# Patient Record
Sex: MALE | Race: BLACK OR AFRICAN AMERICAN | Employment: UNEMPLOYED | URBAN - METROPOLITAN AREA
[De-identification: names, ages, dates, MRNs, and addresses within clinical notes are randomized per-mention and may not be internally consistent; named-entity substitution may affect disease eponyms.]

---

## 2018-01-13 PROBLEM — R19.7 NAUSEA VOMITING AND DIARRHEA: Status: ACTIVE | Noted: 2018-01-13

## 2018-01-13 PROBLEM — R11.2 NAUSEA VOMITING AND DIARRHEA: Status: ACTIVE | Noted: 2018-01-13

## 2018-01-13 PROBLEM — R10.10 PAIN OF UPPER ABDOMEN: Status: ACTIVE | Noted: 2018-01-13

## 2018-03-14 ENCOUNTER — TELEPHONE (OUTPATIENT)
Dept: ADMINISTRATIVE | Age: 48
End: 2018-03-14

## 2018-04-17 ENCOUNTER — OFFICE VISIT (OUTPATIENT)
Dept: FAMILY MEDICINE CLINIC | Age: 48
End: 2018-04-17
Payer: COMMERCIAL

## 2018-04-17 VITALS
SYSTOLIC BLOOD PRESSURE: 130 MMHG | RESPIRATION RATE: 12 BRPM | TEMPERATURE: 98.4 F | WEIGHT: 170 LBS | BODY MASS INDEX: 24.05 KG/M2 | HEART RATE: 82 BPM | DIASTOLIC BLOOD PRESSURE: 88 MMHG | OXYGEN SATURATION: 98 %

## 2018-04-17 DIAGNOSIS — I10 HYPERTENSION, UNSPECIFIED TYPE: ICD-10-CM

## 2018-04-17 DIAGNOSIS — M67.432 GANGLION CYST OF WRIST, LEFT: Primary | ICD-10-CM

## 2018-04-17 PROCEDURE — G8420 CALC BMI NORM PARAMETERS: HCPCS | Performed by: FAMILY MEDICINE

## 2018-04-17 PROCEDURE — G8427 DOCREV CUR MEDS BY ELIG CLIN: HCPCS | Performed by: FAMILY MEDICINE

## 2018-04-17 PROCEDURE — 99212 OFFICE O/P EST SF 10 MIN: CPT | Performed by: STUDENT IN AN ORGANIZED HEALTH CARE EDUCATION/TRAINING PROGRAM

## 2018-04-17 PROCEDURE — 1036F TOBACCO NON-USER: CPT | Performed by: FAMILY MEDICINE

## 2018-04-17 PROCEDURE — 99213 OFFICE O/P EST LOW 20 MIN: CPT | Performed by: STUDENT IN AN ORGANIZED HEALTH CARE EDUCATION/TRAINING PROGRAM

## 2018-04-17 RX ORDER — NAPROXEN 500 MG/1
500 TABLET ORAL 2 TIMES DAILY WITH MEALS
Qty: 60 TABLET | Refills: 3 | Status: SHIPPED | OUTPATIENT
Start: 2018-04-17 | End: 2018-10-28 | Stop reason: SINTOL

## 2018-04-17 ASSESSMENT — PATIENT HEALTH QUESTIONNAIRE - PHQ9
1. LITTLE INTEREST OR PLEASURE IN DOING THINGS: 0
SUM OF ALL RESPONSES TO PHQ QUESTIONS 1-9: 0
SUM OF ALL RESPONSES TO PHQ9 QUESTIONS 1 & 2: 0
2. FEELING DOWN, DEPRESSED OR HOPELESS: 0

## 2018-04-23 ASSESSMENT — ENCOUNTER SYMPTOMS
HEARTBURN: 0
SHORTNESS OF BREATH: 0
ABDOMINAL PAIN: 0
WHEEZING: 0

## 2018-05-07 ENCOUNTER — TELEPHONE (OUTPATIENT)
Dept: ADMINISTRATIVE | Age: 48
End: 2018-05-07

## 2018-05-17 ENCOUNTER — PROCEDURE VISIT (OUTPATIENT)
Dept: FAMILY MEDICINE CLINIC | Age: 48
End: 2018-05-17
Payer: COMMERCIAL

## 2018-05-17 ENCOUNTER — HOSPITAL ENCOUNTER (OUTPATIENT)
Age: 48
Discharge: HOME | End: 2018-05-19
Payer: COMMERCIAL

## 2018-05-17 VITALS
WEIGHT: 162 LBS | TEMPERATURE: 98.6 F | SYSTOLIC BLOOD PRESSURE: 154 MMHG | RESPIRATION RATE: 16 BRPM | OXYGEN SATURATION: 99 % | DIASTOLIC BLOOD PRESSURE: 98 MMHG | BODY MASS INDEX: 23.99 KG/M2 | HEIGHT: 69 IN | HEART RATE: 82 BPM

## 2018-05-17 DIAGNOSIS — I10 ESSENTIAL HYPERTENSION: ICD-10-CM

## 2018-05-17 DIAGNOSIS — Z20.2 POTENTIAL EXPOSURE TO STD: ICD-10-CM

## 2018-05-17 DIAGNOSIS — A64 STD (MALE): ICD-10-CM

## 2018-05-17 DIAGNOSIS — M67.432 GANGLION CYST OF WRIST, LEFT: Primary | ICD-10-CM

## 2018-05-17 PROCEDURE — G8420 CALC BMI NORM PARAMETERS: HCPCS | Performed by: STUDENT IN AN ORGANIZED HEALTH CARE EDUCATION/TRAINING PROGRAM

## 2018-05-17 PROCEDURE — 2500000003 HC RX 250 WO HCPCS

## 2018-05-17 PROCEDURE — 99213 OFFICE O/P EST LOW 20 MIN: CPT | Performed by: STUDENT IN AN ORGANIZED HEALTH CARE EDUCATION/TRAINING PROGRAM

## 2018-05-17 PROCEDURE — 87591 N.GONORRHOEAE DNA AMP PROB: CPT

## 2018-05-17 PROCEDURE — G8427 DOCREV CUR MEDS BY ELIG CLIN: HCPCS | Performed by: STUDENT IN AN ORGANIZED HEALTH CARE EDUCATION/TRAINING PROGRAM

## 2018-05-17 PROCEDURE — 87491 CHLMYD TRACH DNA AMP PROBE: CPT

## 2018-05-17 PROCEDURE — 99212 OFFICE O/P EST SF 10 MIN: CPT | Performed by: STUDENT IN AN ORGANIZED HEALTH CARE EDUCATION/TRAINING PROGRAM

## 2018-05-17 PROCEDURE — 20612 ASPIRATE/INJ GANGLION CYST: CPT | Performed by: STUDENT IN AN ORGANIZED HEALTH CARE EDUCATION/TRAINING PROGRAM

## 2018-05-17 PROCEDURE — 1036F TOBACCO NON-USER: CPT | Performed by: STUDENT IN AN ORGANIZED HEALTH CARE EDUCATION/TRAINING PROGRAM

## 2018-05-17 RX ORDER — LIDOCAINE HYDROCHLORIDE 10 MG/ML
1.5 INJECTION, SOLUTION INFILTRATION; PERINEURAL ONCE
Status: COMPLETED | OUTPATIENT
Start: 2018-05-17 | End: 2018-05-17

## 2018-05-17 RX ORDER — LISINOPRIL 5 MG/1
5 TABLET ORAL DAILY
Qty: 30 TABLET | Refills: 3 | Status: SHIPPED | OUTPATIENT
Start: 2018-05-17 | End: 2018-07-19 | Stop reason: SDUPTHER

## 2018-05-17 RX ADMIN — LIDOCAINE HYDROCHLORIDE 1.5 ML: 10 INJECTION, SOLUTION INFILTRATION; PERINEURAL at 14:30

## 2018-05-23 LAB
CHLAMYDIA TRACHOMATIS AMPLIFIED DET: NORMAL
N GONORRHOEAE AMPLIFIED DET: NORMAL

## 2018-05-24 ENCOUNTER — TELEPHONE (OUTPATIENT)
Dept: FAMILY MEDICINE CLINIC | Age: 48
End: 2018-05-24

## 2018-07-19 DIAGNOSIS — I10 ESSENTIAL HYPERTENSION: ICD-10-CM

## 2018-07-19 RX ORDER — LISINOPRIL 5 MG/1
5 TABLET ORAL DAILY
Qty: 30 TABLET | Refills: 3 | Status: SHIPPED | OUTPATIENT
Start: 2018-07-19 | End: 2018-10-11 | Stop reason: SDUPTHER

## 2018-08-08 ENCOUNTER — TELEPHONE (OUTPATIENT)
Dept: ADMINISTRATIVE | Age: 48
End: 2018-08-08

## 2018-10-11 DIAGNOSIS — I10 ESSENTIAL HYPERTENSION: ICD-10-CM

## 2018-10-11 RX ORDER — LISINOPRIL 5 MG/1
5 TABLET ORAL DAILY
Qty: 30 TABLET | Refills: 3 | Status: SHIPPED | OUTPATIENT
Start: 2018-10-11 | End: 2019-07-24 | Stop reason: SDUPTHER

## 2018-10-25 ENCOUNTER — OFFICE VISIT (OUTPATIENT)
Dept: FAMILY MEDICINE CLINIC | Age: 48
End: 2018-10-25
Payer: COMMERCIAL

## 2018-10-25 VITALS
TEMPERATURE: 99.1 F | HEART RATE: 70 BPM | OXYGEN SATURATION: 98 % | SYSTOLIC BLOOD PRESSURE: 130 MMHG | BODY MASS INDEX: 26.05 KG/M2 | RESPIRATION RATE: 16 BRPM | DIASTOLIC BLOOD PRESSURE: 80 MMHG | WEIGHT: 171.9 LBS | HEIGHT: 68 IN

## 2018-10-25 DIAGNOSIS — R12 HEARTBURN: ICD-10-CM

## 2018-10-25 DIAGNOSIS — Z71.1 CONCERN ABOUT SEXUALLY TRANSMITTED DISEASE IN MALE WITHOUT DIAGNOSIS: ICD-10-CM

## 2018-10-25 DIAGNOSIS — M67.432 GANGLION CYST OF DORSUM OF LEFT WRIST: ICD-10-CM

## 2018-10-25 DIAGNOSIS — I10 ESSENTIAL HYPERTENSION: ICD-10-CM

## 2018-10-25 DIAGNOSIS — K92.1 BLOOD IN STOOL: Primary | ICD-10-CM

## 2018-10-25 PROCEDURE — G8419 CALC BMI OUT NRM PARAM NOF/U: HCPCS | Performed by: STUDENT IN AN ORGANIZED HEALTH CARE EDUCATION/TRAINING PROGRAM

## 2018-10-25 PROCEDURE — 1036F TOBACCO NON-USER: CPT | Performed by: STUDENT IN AN ORGANIZED HEALTH CARE EDUCATION/TRAINING PROGRAM

## 2018-10-25 PROCEDURE — G8484 FLU IMMUNIZE NO ADMIN: HCPCS | Performed by: STUDENT IN AN ORGANIZED HEALTH CARE EDUCATION/TRAINING PROGRAM

## 2018-10-25 PROCEDURE — 99213 OFFICE O/P EST LOW 20 MIN: CPT | Performed by: STUDENT IN AN ORGANIZED HEALTH CARE EDUCATION/TRAINING PROGRAM

## 2018-10-25 PROCEDURE — 99212 OFFICE O/P EST SF 10 MIN: CPT | Performed by: STUDENT IN AN ORGANIZED HEALTH CARE EDUCATION/TRAINING PROGRAM

## 2018-10-25 PROCEDURE — G8427 DOCREV CUR MEDS BY ELIG CLIN: HCPCS | Performed by: STUDENT IN AN ORGANIZED HEALTH CARE EDUCATION/TRAINING PROGRAM

## 2018-10-26 ENCOUNTER — HOSPITAL ENCOUNTER (OUTPATIENT)
Age: 48
Discharge: HOME | End: 2018-10-28
Payer: COMMERCIAL

## 2018-10-26 DIAGNOSIS — Z71.1 CONCERN ABOUT SEXUALLY TRANSMITTED DISEASE IN MALE WITHOUT DIAGNOSIS: ICD-10-CM

## 2018-10-26 PROCEDURE — 87591 N.GONORRHOEAE DNA AMP PROB: CPT

## 2018-10-26 PROCEDURE — 87491 CHLMYD TRACH DNA AMP PROBE: CPT

## 2018-10-28 ASSESSMENT — ENCOUNTER SYMPTOMS
SHORTNESS OF BREATH: 0
ABDOMINAL PAIN: 0
WHEEZING: 0

## 2018-10-29 LAB
CHLAMYDIA TRACHOMATIS AMPLIFIED DET: NORMAL
N GONORRHOEAE AMPLIFIED DET: NORMAL

## 2018-10-29 RX ORDER — OMEPRAZOLE 40 MG/1
40 CAPSULE, DELAYED RELEASE ORAL
Qty: 90 CAPSULE | Refills: 0 | Status: SHIPPED | OUTPATIENT
Start: 2018-10-29 | End: 2019-09-20 | Stop reason: SDUPTHER

## 2018-11-05 ENCOUNTER — TELEPHONE (OUTPATIENT)
Dept: FAMILY MEDICINE CLINIC | Age: 48
End: 2018-11-05

## 2018-12-01 RX ORDER — NAPROXEN 500 MG/1
500 TABLET ORAL 2 TIMES DAILY WITH MEALS
Qty: 60 TABLET | Refills: 3 | Status: SHIPPED | OUTPATIENT
Start: 2018-12-01 | End: 2019-09-20 | Stop reason: SDUPTHER

## 2019-01-29 ENCOUNTER — TELEPHONE (OUTPATIENT)
Dept: SURGERY | Age: 49
End: 2019-01-29

## 2019-07-24 DIAGNOSIS — I10 ESSENTIAL HYPERTENSION: ICD-10-CM

## 2019-07-24 RX ORDER — LISINOPRIL 5 MG/1
5 TABLET ORAL DAILY
Qty: 30 TABLET | Refills: 3 | Status: SHIPPED | OUTPATIENT
Start: 2019-07-24 | End: 2019-09-20 | Stop reason: SDUPTHER

## 2019-08-22 ENCOUNTER — OFFICE VISIT (OUTPATIENT)
Dept: FAMILY MEDICINE CLINIC | Age: 49
End: 2019-08-22
Payer: COMMERCIAL

## 2019-08-22 ENCOUNTER — HOSPITAL ENCOUNTER (OUTPATIENT)
Age: 49
Discharge: HOME | End: 2019-08-24
Payer: COMMERCIAL

## 2019-08-22 VITALS
HEART RATE: 70 BPM | RESPIRATION RATE: 12 BRPM | DIASTOLIC BLOOD PRESSURE: 106 MMHG | OXYGEN SATURATION: 99 % | SYSTOLIC BLOOD PRESSURE: 152 MMHG | BODY MASS INDEX: 23.49 KG/M2 | HEIGHT: 68 IN | TEMPERATURE: 98.5 F | WEIGHT: 155 LBS

## 2019-08-22 DIAGNOSIS — M67.432 GANGLION CYST OF DORSUM OF LEFT WRIST: ICD-10-CM

## 2019-08-22 DIAGNOSIS — Z71.1 CONCERN ABOUT SEXUALLY TRANSMITTED DISEASE IN MALE WITHOUT DIAGNOSIS: Primary | ICD-10-CM

## 2019-08-22 DIAGNOSIS — I10 ESSENTIAL HYPERTENSION: ICD-10-CM

## 2019-08-22 DIAGNOSIS — M79.661 RIGHT CALF PAIN: ICD-10-CM

## 2019-08-22 DIAGNOSIS — Z00.00 HEALTHCARE MAINTENANCE: ICD-10-CM

## 2019-08-22 DIAGNOSIS — Z71.1 CONCERN ABOUT SEXUALLY TRANSMITTED DISEASE IN MALE WITHOUT DIAGNOSIS: ICD-10-CM

## 2019-08-22 LAB
ANION GAP SERPL CALCULATED.3IONS-SCNC: 13 MMOL/L (ref 7–16)
BUN BLDV-MCNC: 10 MG/DL (ref 6–20)
CALCIUM SERPL-MCNC: 9.4 MG/DL (ref 8.6–10.2)
CHLORIDE BLD-SCNC: 104 MMOL/L (ref 98–107)
CO2: 24 MMOL/L (ref 22–29)
CREAT SERPL-MCNC: 1.2 MG/DL (ref 0.7–1.2)
GFR AFRICAN AMERICAN: >60
GFR NON-AFRICAN AMERICAN: >60 ML/MIN/1.73
GLUCOSE BLD-MCNC: 92 MG/DL (ref 74–99)
POTASSIUM SERPL-SCNC: 4.1 MMOL/L (ref 3.5–5)
SODIUM BLD-SCNC: 141 MMOL/L (ref 132–146)

## 2019-08-22 PROCEDURE — 1036F TOBACCO NON-USER: CPT | Performed by: STUDENT IN AN ORGANIZED HEALTH CARE EDUCATION/TRAINING PROGRAM

## 2019-08-22 PROCEDURE — G8428 CUR MEDS NOT DOCUMENT: HCPCS | Performed by: STUDENT IN AN ORGANIZED HEALTH CARE EDUCATION/TRAINING PROGRAM

## 2019-08-22 PROCEDURE — 99213 OFFICE O/P EST LOW 20 MIN: CPT | Performed by: STUDENT IN AN ORGANIZED HEALTH CARE EDUCATION/TRAINING PROGRAM

## 2019-08-22 PROCEDURE — G8420 CALC BMI NORM PARAMETERS: HCPCS | Performed by: STUDENT IN AN ORGANIZED HEALTH CARE EDUCATION/TRAINING PROGRAM

## 2019-08-22 PROCEDURE — 36415 COLL VENOUS BLD VENIPUNCTURE: CPT | Performed by: FAMILY MEDICINE

## 2019-08-22 PROCEDURE — 87491 CHLMYD TRACH DNA AMP PROBE: CPT

## 2019-08-22 PROCEDURE — 99212 OFFICE O/P EST SF 10 MIN: CPT | Performed by: STUDENT IN AN ORGANIZED HEALTH CARE EDUCATION/TRAINING PROGRAM

## 2019-08-22 PROCEDURE — 87591 N.GONORRHOEAE DNA AMP PROB: CPT

## 2019-08-22 PROCEDURE — 80048 BASIC METABOLIC PNL TOTAL CA: CPT

## 2019-08-22 PROCEDURE — 36415 COLL VENOUS BLD VENIPUNCTURE: CPT

## 2019-08-22 NOTE — PROGRESS NOTES
[address]  Department of Family Medicine  Family Medicine Residency Program      Patient:  Riaz Bach 50 y.o. male     Date of Service: 8/22/19      Chief complaint:   Chief Complaint   Patient presents with    Cyst     left wrist    Other     urine tingling         History of Present Illness   The patient is a 50 y.o. male who presented to the clinic for check up. Pain in the right calf: Has been hurting for 2 weeks. It is intermittent, no particular alleviation or aggravating factors. No history of immobility, no history of clotting disease, no recent travels. High risk sexual behavior: Patient reports to be still sexually active with 2 women. He is not consistent with using protection. She wants to get a GC/chlamydia checked for to be on the safe side. He denies any penile discharge, any rash. He reports occasional scrotal tingling. He denies dysuria or hematuria. Patient had reported this before, however it is much less frequent now. Ganglion cyst on left wrist.  He has had this before, which was aspirated. Patient was warned that there was a high likelihood that he may come back. He came back, however not as big as before. Patient denies any pain, currently not bothering him. HTN: BP elevated today. Patient is on lisinopril 5 mg. Patient does not want to take more medication or increase the dose of the lisinopril. He will come back in 1 week for BP check. Patient denies any chest pain, dizziness, headaches. Past Medical History:      Diagnosis Date    Hypertension        Past Surgical History:    No past surgical history on file. Allergies:    Patient has no known allergies.     Social History:   Social History     Socioeconomic History    Marital status: Single     Spouse name: Not on file    Number of children: Not on file    Years of education: Not on file    Highest education level: Not on file   Occupational History    Not on file Social Needs    Financial resource strain: Not on file    Food insecurity:     Worry: Not on file     Inability: Not on file    Transportation needs:     Medical: Not on file     Non-medical: Not on file   Tobacco Use    Smoking status: Never Smoker    Smokeless tobacco: Never Used   Substance and Sexual Activity    Alcohol use: Yes    Drug use: No    Sexual activity: Not on file   Lifestyle    Physical activity:     Days per week: Not on file     Minutes per session: Not on file    Stress: Not on file   Relationships    Social connections:     Talks on phone: Not on file     Gets together: Not on file     Attends Nondenominational service: Not on file     Active member of club or organization: Not on file     Attends meetings of clubs or organizations: Not on file     Relationship status: Not on file    Intimate partner violence:     Fear of current or ex partner: Not on file     Emotionally abused: Not on file     Physically abused: Not on file     Forced sexual activity: Not on file   Other Topics Concern    Not on file   Social History Narrative    Not on file       Family History:   No family history on file. [employer] Systems:   Review of Systems   Constitutional: Negative for fever. Respiratory: Negative for shortness of breath and wheezing. Cardiovascular: Negative for chest pain and palpitations. Gastrointestinal: Negative for abdominal pain. Genitourinary: Negative for discharge, dysuria, frequency, genital sores, hematuria, scrotal swelling and testicular pain.    Musculoskeletal:        Ganglion cyst on left wrist         Physical Exam   Vitals: BP (!) 152/106 (Site: Left Upper Arm, Position: Sitting, Cuff Size: Medium Adult)   Pulse 70   Temp 98.5 °F (36.9 °C) (Oral)   Resp 12   Ht 5' 8\" (1.727 m)   Wt 155 lb (70.3 kg)   SpO2 99%   BMI 23.57 kg/m²   General Appearance: alert, oriented, no acute distress  Chest wall/Lung: Clear to auscultation bilaterally,  respirations

## 2019-08-22 NOTE — PATIENT INSTRUCTIONS
Patient Education        Calf Strain: Rehab Exercises  Introduction  Here are some examples of exercises for you to try. The exercises may be suggested for a condition or for rehabilitation. Start each exercise slowly. Ease off the exercises if you start to have pain. You will be told when to start these exercises and which ones will work best for you. How to do the exercises  Calf wall stretch (back knee straight)    1. Stand facing a wall with your hands on the wall at about eye level. Put your affected leg about a step behind your other leg. 2. Keeping your back leg straight and your back heel on the floor, bend your front knee and gently bring your hip and chest toward the wall until you feel a stretch in the calf of your back leg. 3. Hold the stretch for at least 15 to 30 seconds. 4. Repeat 2 to 4 times. Calf wall stretch (knees bent)    1. Stand facing a wall with your hands on the wall at about eye level. Put your affected leg about a step behind your other leg. 2. Keeping both heels on the floor, bend both knees. Then gently bring your hip and chest toward the wall until you feel a stretch in the calf of your back leg. 3. Hold the stretch for at least 15 to 30 seconds. 4. Repeat 2 to 4 times. Bilateral calf stretch (knees straight)    1. Place a book on the floor a few inches from a wall or countertop, and put the balls of your feet on it. Your heels should be on the floor. The book needs to be thick enough so that you can feel a gentle stretch in each calf. If you are not steady on your feet, hold on to a chair, counter, or wall while you do this stretch. 2. Keep your knees straight, and lean forward until you feel a stretch in each calf. 3. To get more stretch, add another book or use a thicker book, such as a phone book, a dictionary, or an encyclopedia. 4. Hold the stretch for at least 15 to 30 seconds. 5. Repeat 2 to 4 times. Bilateral calf stretch (knees bent)    1.  Place a book on the floor a few inches from a wall or countertop, and put the balls of your feet on it. Your heels should be on the floor. The book needs to be thick enough so that you can feel a gentle stretch in each calf. If you are not steady on your feet, hold on to a chair, counter, or wall while you do this stretch. 2. Bend your knees, and lean forward until you feel a stretch in each calf. 3. To get more stretch, add another book or use a thicker book, such as a phone book, a dictionary, or an encyclopedia. 4. Hold the stretch for at least 15 to 30 seconds. 5. Repeat 2 to 4 times. Ankle plantarflexion    1. Sit with your affected leg straight and supported on the floor. Your other leg should be bent, with that foot flat on the floor. 2. Keeping your affected leg straight, gently flex your foot downward so your toes are pointed away from your body. Then slowly relax your foot to the starting position. 3. Repeat 8 to 12 times. Ankle dorsiflexion    1. Sit with your affected leg straight and supported on the floor. Your other leg should be bent, with that foot flat on the floor. 2. Keeping your leg straight, gently flex your foot back so your toes point upward. Then slowly relax your foot to the starting position. 3. Repeat 8 to 12 times. Bilateral heel raises on step    1. Stand on the bottom step of a staircase, facing up toward the stairs. Put the balls of your feet on the step. If you are not steady on your feet, hold on to the banister or wall. 2. Keeping both knees straight, slowly lift your heels above the step so that you are standing on your toes. Then slowly lower your heels below the step and toward the floor. 3. Return to the starting position, with your feet even with the step. 4. Repeat 8 to 12 times. Follow-up care is a key part of your treatment and safety. Be sure to make and go to all appointments, and call your doctor if you are having problems.  It's also a good idea to know your test

## 2019-08-23 ASSESSMENT — ENCOUNTER SYMPTOMS
WHEEZING: 0
SHORTNESS OF BREATH: 0
ABDOMINAL PAIN: 0

## 2019-08-27 LAB
C. TRACHOMATIS DNA ,URINE: NEGATIVE
N. GONORRHOEAE DNA, URINE: NEGATIVE
SOURCE: NORMAL

## 2019-09-17 ENCOUNTER — TELEPHONE (OUTPATIENT)
Dept: FAMILY MEDICINE CLINIC | Age: 49
End: 2019-09-17

## 2019-09-17 NOTE — TELEPHONE ENCOUNTER
I called the patient back and discussed about results. He endorses understanding. He will call to make an appointment after he gets back home from out of town sometime next week.      Harriet Roque MD

## 2019-09-20 DIAGNOSIS — I10 ESSENTIAL HYPERTENSION: ICD-10-CM

## 2019-09-20 DIAGNOSIS — R12 HEARTBURN: ICD-10-CM

## 2019-09-20 RX ORDER — OMEPRAZOLE 40 MG/1
40 CAPSULE, DELAYED RELEASE ORAL
Qty: 90 CAPSULE | Refills: 0 | Status: SHIPPED | OUTPATIENT
Start: 2019-09-20 | End: 2019-10-17 | Stop reason: SDUPTHER

## 2019-09-20 RX ORDER — NAPROXEN 500 MG/1
500 TABLET ORAL 2 TIMES DAILY WITH MEALS
Qty: 60 TABLET | Refills: 2 | Status: SHIPPED
Start: 2019-09-20 | End: 2020-02-24 | Stop reason: SDUPTHER

## 2019-09-20 RX ORDER — LISINOPRIL 5 MG/1
5 TABLET ORAL DAILY
Qty: 90 TABLET | Refills: 1 | Status: SHIPPED | OUTPATIENT
Start: 2019-09-20 | End: 2019-10-17 | Stop reason: SDUPTHER

## 2019-10-17 ENCOUNTER — OFFICE VISIT (OUTPATIENT)
Dept: FAMILY MEDICINE CLINIC | Age: 49
End: 2019-10-17
Payer: COMMERCIAL

## 2019-10-17 ENCOUNTER — HOSPITAL ENCOUNTER (OUTPATIENT)
Age: 49
Discharge: HOME | End: 2019-10-19
Payer: COMMERCIAL

## 2019-10-17 VITALS
HEART RATE: 79 BPM | TEMPERATURE: 98.4 F | BODY MASS INDEX: 23.95 KG/M2 | DIASTOLIC BLOOD PRESSURE: 95 MMHG | OXYGEN SATURATION: 97 % | HEIGHT: 68 IN | WEIGHT: 158 LBS | SYSTOLIC BLOOD PRESSURE: 136 MMHG

## 2019-10-17 DIAGNOSIS — Z23 NEED FOR VACCINATION FOR H FLU TYPE B: Primary | ICD-10-CM

## 2019-10-17 DIAGNOSIS — M54.41 ACUTE RIGHT-SIDED LOW BACK PAIN WITH RIGHT-SIDED SCIATICA: ICD-10-CM

## 2019-10-17 DIAGNOSIS — I10 ESSENTIAL HYPERTENSION: ICD-10-CM

## 2019-10-17 DIAGNOSIS — Z92.89: ICD-10-CM

## 2019-10-17 DIAGNOSIS — J06.9 VIRAL URI: ICD-10-CM

## 2019-10-17 DIAGNOSIS — M25.531 WRIST PAIN, ACUTE, RIGHT: ICD-10-CM

## 2019-10-17 DIAGNOSIS — M79.661 RIGHT CALF PAIN: ICD-10-CM

## 2019-10-17 DIAGNOSIS — R12 HEARTBURN: ICD-10-CM

## 2019-10-17 LAB
ANION GAP SERPL CALCULATED.3IONS-SCNC: 10 MMOL/L (ref 7–16)
BUN BLDV-MCNC: 15 MG/DL (ref 6–20)
CALCIUM SERPL-MCNC: 9.9 MG/DL (ref 8.6–10.2)
CHLORIDE BLD-SCNC: 96 MMOL/L (ref 98–107)
CO2: 28 MMOL/L (ref 22–29)
CREAT SERPL-MCNC: 1.2 MG/DL (ref 0.7–1.2)
GFR AFRICAN AMERICAN: >60
GFR NON-AFRICAN AMERICAN: >60 ML/MIN/1.73
GLUCOSE BLD-MCNC: 118 MG/DL (ref 74–99)
POTASSIUM SERPL-SCNC: 3.9 MMOL/L (ref 3.5–5)
RHEUMATOID FACTOR: 13 IU/ML (ref 0–13)
SODIUM BLD-SCNC: 134 MMOL/L (ref 132–146)

## 2019-10-17 PROCEDURE — 6360000002 HC RX W HCPCS

## 2019-10-17 PROCEDURE — G0008 ADMIN INFLUENZA VIRUS VAC: HCPCS

## 2019-10-17 PROCEDURE — 80048 BASIC METABOLIC PNL TOTAL CA: CPT

## 2019-10-17 PROCEDURE — 86431 RHEUMATOID FACTOR QUANT: CPT

## 2019-10-17 PROCEDURE — 36415 COLL VENOUS BLD VENIPUNCTURE: CPT

## 2019-10-17 PROCEDURE — 36415 COLL VENOUS BLD VENIPUNCTURE: CPT | Performed by: FAMILY MEDICINE

## 2019-10-17 PROCEDURE — 90686 IIV4 VACC NO PRSV 0.5 ML IM: CPT

## 2019-10-17 PROCEDURE — 99212 OFFICE O/P EST SF 10 MIN: CPT | Performed by: STUDENT IN AN ORGANIZED HEALTH CARE EDUCATION/TRAINING PROGRAM

## 2019-10-17 PROCEDURE — 99213 OFFICE O/P EST LOW 20 MIN: CPT | Performed by: STUDENT IN AN ORGANIZED HEALTH CARE EDUCATION/TRAINING PROGRAM

## 2019-10-17 RX ORDER — OMEPRAZOLE 40 MG/1
40 CAPSULE, DELAYED RELEASE ORAL
Qty: 90 CAPSULE | Refills: 0 | Status: SHIPPED
Start: 2019-10-17 | End: 2020-02-24 | Stop reason: SDUPTHER

## 2019-10-17 RX ORDER — LISINOPRIL 10 MG/1
10 TABLET ORAL DAILY
Qty: 90 TABLET | Refills: 1 | Status: SHIPPED
Start: 2019-10-17 | End: 2020-02-24 | Stop reason: SDUPTHER

## 2019-10-17 RX ORDER — LISINOPRIL 5 MG/1
5 TABLET ORAL DAILY
Qty: 90 TABLET | Refills: 1 | Status: SHIPPED | OUTPATIENT
Start: 2019-10-17 | End: 2019-10-17 | Stop reason: ALTCHOICE

## 2019-10-17 ASSESSMENT — PATIENT HEALTH QUESTIONNAIRE - PHQ9
SUM OF ALL RESPONSES TO PHQ9 QUESTIONS 1 & 2: 2
1. LITTLE INTEREST OR PLEASURE IN DOING THINGS: 1
SUM OF ALL RESPONSES TO PHQ QUESTIONS 1-9: 2
2. FEELING DOWN, DEPRESSED OR HOPELESS: 1
SUM OF ALL RESPONSES TO PHQ QUESTIONS 1-9: 2

## 2019-10-18 ENCOUNTER — TELEPHONE (OUTPATIENT)
Dept: FAMILY MEDICINE CLINIC | Age: 49
End: 2019-10-18

## 2019-10-19 RX ORDER — BENZONATATE 100 MG/1
100-200 CAPSULE ORAL 3 TIMES DAILY PRN
Qty: 42 CAPSULE | Refills: 0 | Status: SHIPPED | OUTPATIENT
Start: 2019-10-19 | End: 2019-10-26

## 2019-11-07 ENCOUNTER — HOSPITAL ENCOUNTER (OUTPATIENT)
Dept: GENERAL RADIOLOGY | Age: 49
Discharge: HOME | End: 2019-11-09
Payer: COMMERCIAL

## 2019-11-07 ENCOUNTER — TELEPHONE (OUTPATIENT)
Dept: FAMILY MEDICINE CLINIC | Age: 49
End: 2019-11-07

## 2019-11-07 ENCOUNTER — HOSPITAL ENCOUNTER (OUTPATIENT)
Age: 49
Discharge: HOME | End: 2019-11-09
Payer: COMMERCIAL

## 2019-11-07 ENCOUNTER — HOSPITAL ENCOUNTER (OUTPATIENT)
Dept: ULTRASOUND IMAGING | Age: 49
Discharge: HOME | End: 2019-11-09
Payer: COMMERCIAL

## 2019-11-07 DIAGNOSIS — M54.41 ACUTE RIGHT-SIDED LOW BACK PAIN WITH RIGHT-SIDED SCIATICA: ICD-10-CM

## 2019-11-07 DIAGNOSIS — M79.661 RIGHT CALF PAIN: Primary | ICD-10-CM

## 2019-11-07 DIAGNOSIS — M79.661 RIGHT CALF PAIN: ICD-10-CM

## 2019-11-07 PROCEDURE — 93971 EXTREMITY STUDY: CPT

## 2019-11-07 PROCEDURE — 72100 X-RAY EXAM L-S SPINE 2/3 VWS: CPT

## 2019-11-07 PROCEDURE — 73521 X-RAY EXAM HIPS BI 2 VIEWS: CPT

## 2019-11-20 ENCOUNTER — TELEPHONE (OUTPATIENT)
Dept: FAMILY MEDICINE CLINIC | Age: 49
End: 2019-11-20

## 2019-11-21 ENCOUNTER — TELEPHONE (OUTPATIENT)
Dept: FAMILY MEDICINE CLINIC | Age: 49
End: 2019-11-21

## 2020-02-24 ENCOUNTER — HOSPITAL ENCOUNTER (OUTPATIENT)
Age: 50
Discharge: HOME | End: 2020-02-26
Payer: COMMERCIAL

## 2020-02-24 ENCOUNTER — OFFICE VISIT (OUTPATIENT)
Dept: FAMILY MEDICINE CLINIC | Age: 50
End: 2020-02-24
Payer: COMMERCIAL

## 2020-02-24 VITALS
DIASTOLIC BLOOD PRESSURE: 92 MMHG | SYSTOLIC BLOOD PRESSURE: 158 MMHG | WEIGHT: 158 LBS | HEIGHT: 68 IN | BODY MASS INDEX: 23.95 KG/M2 | HEART RATE: 74 BPM | OXYGEN SATURATION: 99 % | TEMPERATURE: 98.6 F

## 2020-02-24 PROCEDURE — G8427 DOCREV CUR MEDS BY ELIG CLIN: HCPCS | Performed by: STUDENT IN AN ORGANIZED HEALTH CARE EDUCATION/TRAINING PROGRAM

## 2020-02-24 PROCEDURE — 36415 COLL VENOUS BLD VENIPUNCTURE: CPT | Performed by: FAMILY MEDICINE

## 2020-02-24 PROCEDURE — 87591 N.GONORRHOEAE DNA AMP PROB: CPT

## 2020-02-24 PROCEDURE — G8482 FLU IMMUNIZE ORDER/ADMIN: HCPCS | Performed by: STUDENT IN AN ORGANIZED HEALTH CARE EDUCATION/TRAINING PROGRAM

## 2020-02-24 PROCEDURE — 1036F TOBACCO NON-USER: CPT | Performed by: STUDENT IN AN ORGANIZED HEALTH CARE EDUCATION/TRAINING PROGRAM

## 2020-02-24 PROCEDURE — 84443 ASSAY THYROID STIM HORMONE: CPT

## 2020-02-24 PROCEDURE — 99212 OFFICE O/P EST SF 10 MIN: CPT | Performed by: STUDENT IN AN ORGANIZED HEALTH CARE EDUCATION/TRAINING PROGRAM

## 2020-02-24 PROCEDURE — 87491 CHLMYD TRACH DNA AMP PROBE: CPT

## 2020-02-24 PROCEDURE — 99213 OFFICE O/P EST LOW 20 MIN: CPT | Performed by: STUDENT IN AN ORGANIZED HEALTH CARE EDUCATION/TRAINING PROGRAM

## 2020-02-24 PROCEDURE — G8420 CALC BMI NORM PARAMETERS: HCPCS | Performed by: STUDENT IN AN ORGANIZED HEALTH CARE EDUCATION/TRAINING PROGRAM

## 2020-02-24 RX ORDER — NAPROXEN 500 MG/1
500 TABLET ORAL 2 TIMES DAILY WITH MEALS
Qty: 60 TABLET | Refills: 2 | Status: SHIPPED
Start: 2020-02-24 | End: 2020-05-11

## 2020-02-24 RX ORDER — BENZONATATE 100 MG/1
100-200 CAPSULE ORAL 3 TIMES DAILY PRN
Qty: 60 CAPSULE | Refills: 0 | Status: SHIPPED
Start: 2020-02-24 | End: 2020-05-11

## 2020-02-24 RX ORDER — LISINOPRIL 40 MG/1
40 TABLET ORAL DAILY
Qty: 90 TABLET | Refills: 1 | Status: SHIPPED
Start: 2020-02-24 | End: 2020-03-19

## 2020-02-24 RX ORDER — OMEPRAZOLE 40 MG/1
40 CAPSULE, DELAYED RELEASE ORAL
Qty: 90 CAPSULE | Refills: 0 | Status: SHIPPED
Start: 2020-02-24 | End: 2020-04-21 | Stop reason: SDUPTHER

## 2020-02-24 ASSESSMENT — PATIENT HEALTH QUESTIONNAIRE - PHQ9
4. FEELING TIRED OR HAVING LITTLE ENERGY: 3
9. THOUGHTS THAT YOU WOULD BE BETTER OFF DEAD, OR OF HURTING YOURSELF: 1
2. FEELING DOWN, DEPRESSED OR HOPELESS: 1
5. POOR APPETITE OR OVEREATING: 3
SUM OF ALL RESPONSES TO PHQ QUESTIONS 1-9: 23
3. TROUBLE FALLING OR STAYING ASLEEP: 3
8. MOVING OR SPEAKING SO SLOWLY THAT OTHER PEOPLE COULD HAVE NOTICED. OR THE OPPOSITE, BEING SO FIGETY OR RESTLESS THAT YOU HAVE BEEN MOVING AROUND A LOT MORE THAN USUAL: 3
7. TROUBLE CONCENTRATING ON THINGS, SUCH AS READING THE NEWSPAPER OR WATCHING TELEVISION: 3
6. FEELING BAD ABOUT YOURSELF - OR THAT YOU ARE A FAILURE OR HAVE LET YOURSELF OR YOUR FAMILY DOWN: 3
1. LITTLE INTEREST OR PLEASURE IN DOING THINGS: 3
SUM OF ALL RESPONSES TO PHQ9 QUESTIONS 1 & 2: 4
SUM OF ALL RESPONSES TO PHQ QUESTIONS 1-9: 23
10. IF YOU CHECKED OFF ANY PROBLEMS, HOW DIFFICULT HAVE THESE PROBLEMS MADE IT FOR YOU TO DO YOUR WORK, TAKE CARE OF THINGS AT HOME, OR GET ALONG WITH OTHER PEOPLE: 1

## 2020-02-24 ASSESSMENT — COLUMBIA-SUICIDE SEVERITY RATING SCALE - C-SSRS
1. WITHIN THE PAST MONTH, HAVE YOU WISHED YOU WERE DEAD OR WISHED YOU COULD GO TO SLEEP AND NOT WAKE UP?: YES
6. HAVE YOU EVER DONE ANYTHING, STARTED TO DO ANYTHING, OR PREPARED TO DO ANYTHING TO END YOUR LIFE?: NO
2. HAVE YOU ACTUALLY HAD ANY THOUGHTS OF KILLING YOURSELF?: YES
4. HAVE YOU HAD THESE THOUGHTS AND HAD SOME INTENTION OF ACTING ON THEM?: YES
3. HAVE YOU BEEN THINKING ABOUT HOW YOU MIGHT KILL YOURSELF?: NO
5. HAVE YOU STARTED TO WORK OUT OR WORKED OUT THE DETAILS OF HOW TO KILL YOURSELF? DO YOU INTEND TO CARRY OUT THIS PLAN?: NO

## 2020-02-24 ASSESSMENT — ENCOUNTER SYMPTOMS
ABDOMINAL PAIN: 0
WHEEZING: 0
SHORTNESS OF BREATH: 0

## 2020-02-24 NOTE — PROGRESS NOTES
[address]  Department of Family Medicine  Family Medicine Residency Program      Patient:  Krissy Gaston 52 y.o. male     Date of Service: 2/24/20      Chief complaint:   Chief Complaint   Patient presents with    Leg Pain     Both legs    Wrist Pain         History of Present Illness   The patient is a 52 y.o. male who presented to the clinic for check up. Pain in bilateral calves: Has been hurting for intermittently for about 6 months. It is intermittent, no particular alleviation or aggravating factors. Not worse with ambulation. No history of immobility, no history of clotting disease, no recent travels. DVT US in 11/2019 was negative for DVT. Difficulty standing for long. It is affecting his job. High risk sexual behavior: Patient reports to be still sexually active with 2 women. He is not consistent with using protection. She wants to get a GC/chlamydia checked for to be on the safe side. He denies any penile discharge, any rash. He reports occasional scrotal tingling. He denies dysuria or hematuria. Patient had reported this before, however it is much less frequent now. Ganglion cyst on left wrist.  He has had this before, which was aspirated. Patient was warned that there was a high likelihood that he may come back. It has come back and grown almost to the same size as previously. It is currently causing some discomfort on his wrist.    HTN: BP elevated today. Patient is on lisinopril 10 mg. Patient denies any chest pain, dizziness, headaches. HTN prevalent in his family. Positive depression screening: down mood. Irritable. Willing to do counseling. Has had some suicidal thoughts without planning, denies any homicidal ideation. Past Medical History:      Diagnosis Date    Hypertension        Past Surgical History:    No past surgical history on file. Allergies:    Patient has no known allergies.     Social History:   Social History Medium Adult)   Pulse 74   Temp 98.6 °F (37 °C) (Oral)   Ht 5' 8\" (1.727 m)   Wt 158 lb (71.7 kg)   SpO2 99%   BMI 24.02 kg/m²   General Appearance: alert, oriented, no acute distress  Chest wall/Lung: Clear to auscultation bilaterally,  respirations unlabored. No ronchi/wheezing/rales  Heart: Regular rate and rhythm, S1 and S2 normal, no murmur, rub or gallop. Left wrist: Cyst on dorsa-lateral aspect of left wrist, of about 2.5 cm in diameter, movable, soft, some tenderness with palpation, positive transillumination  Extremities: TTP on bilateral legs and calves, no swelling or skin changes noted       Psychiatric: has a normal mood and affect. Behavior is normal.       Assessment and Plan     1. Bilateral leg pain  Unlikely claudication. Will send patient for EMG of bilateral lower extremities. Patient agreeable to try PT if symptoms persist and EMG is unremarkable. Consider HARDEEP. - naproxen (NAPROXEN) 500 MG EC tablet; Take 1 tablet by mouth 2 times daily (with meals)  Dispense: 60 tablet; Refill: 2  - EMG; Future    2. Essential hypertension  Uncontrolled. Increase dose of lisinopril to 40 mg daily. Close follow-up needed  - lisinopril (PRINIVIL;ZESTRIL) 40 MG tablet; Take 1 tablet by mouth daily  Dispense: 90 tablet; Refill: 1    3. Positive depression screening  Patient likely has depression. He declines medication treatment. Patient agreeable to do counseling. He denies any current thoughts of suicide. Verbal contract established.  - TSH; Future  - TSH    4. Concern about sexually transmitted disease in male without diagnosis  - C.TRACHOMATIS Sandie Aase; Future    5. Heartburn  - omeprazole (PRILOSEC) 40 MG delayed release capsule; Take 1 capsule by mouth daily (with breakfast)  Dispense: 90 capsule; Refill: 0    6. Postviral syndrome  - benzonatate (TESSALON) 100 MG capsule; Take 1-2 capsules by mouth 3 times daily as needed for Cough  Dispense: 60 capsule; Refill: 0    7.

## 2020-02-25 LAB — TSH SERPL DL<=0.05 MIU/L-ACNC: 0.52 UIU/ML (ref 0.27–4.2)

## 2020-02-27 LAB
C. TRACHOMATIS DNA ,URINE: NEGATIVE
N. GONORRHOEAE DNA, URINE: NEGATIVE
SOURCE: NORMAL

## 2020-03-18 ENCOUNTER — TELEPHONE (OUTPATIENT)
Dept: FAMILY MEDICINE CLINIC | Age: 50
End: 2020-03-18

## 2020-03-18 ENCOUNTER — HOSPITAL ENCOUNTER (OUTPATIENT)
Dept: NEUROLOGY | Age: 50
Discharge: HOME | End: 2020-03-18
Payer: COMMERCIAL

## 2020-03-18 VITALS — HEIGHT: 69 IN | WEIGHT: 170 LBS | BODY MASS INDEX: 25.18 KG/M2

## 2020-03-18 PROCEDURE — 95911 NRV CNDJ TEST 9-10 STUDIES: CPT | Performed by: PSYCHIATRY & NEUROLOGY

## 2020-03-18 PROCEDURE — 95886 MUSC TEST DONE W/N TEST COMP: CPT | Performed by: PSYCHIATRY & NEUROLOGY

## 2020-03-18 PROCEDURE — 95886 MUSC TEST DONE W/N TEST COMP: CPT

## 2020-03-18 PROCEDURE — 95911 NRV CNDJ TEST 9-10 STUDIES: CPT

## 2020-03-18 NOTE — PROCEDURES
[address]  Electrodiagnostic Laboratory  [facility]        Full Name: Shelton Gant Gender: Male  MRN: [id number] YOB: 1970  Location[de-identified] [facility] (01)      Visit Date: 3/18/2020 07:50  Age: 52 Years 3 Months Old  Examining Physician: Dr. Nahum Prasad   Referring Physician: Dr. Diane Frankel  Technician: America Alba   Height: 5 feet 9 inch  Weight: 170 lbs  Notes: Leg pain; BLE    BMI:  25.2      Motor NCS      Nerve / Sites Lat. Amplitude Amp. 1-2 Distance Lat Diff Velocity Temp.    ms mV % cm ms m/s °C   R Peroneal - EDB      Ankle 5.10 3.1 100 8   31.3      Pop fossa 14.27 2.0 64.7 42 9.17 46 31.7   L Peroneal - EDB      Ankle 3.70 5.9 100 8   31.5      Pop fossa 13.28 5.6 94.3 41 9.58 43 31.5   R Tibial - AH      Ankle 3.39 13.8 100 8   31.5      Pop fossa 13.18 11.9 86.5 46 9.79 47 31.5   L Tibial - AH      Ankle 3.96 2.1 100 8   31.5      Pop fossa 13.54 1.2 57.2 45 9.58 47 31.5       Sensory NCS      Nerve / Sites Onset Lat Peak Lat PP Amp Distance Velocity Temp.    ms ms µV cm m/s °C   R Superficial peroneal - Ankle      Lat leg 2.34 3.07 8.9 10 43 31.6   L Superficial peroneal - Ankle      Lat leg 2.08 2.86 8.9 10 48 31.3   R Sural - Ankle (Calf)      Calf 3.18 3.96 15.9 14 44 31.6   L Sural - Ankle (Calf)      Calf 2.86 3.70 12.4 14 49 31.2       F  Wave      Nerve F Lat M Lat F-M Lat    ms ms ms   R Peroneal - EDB 50.3 4.0 46.4   R Tibial - AH 50.2 3.4 46.8   L Tibial - AH 55.1 4.4 50.7   L Peroneal - EDB 50.9 4.3 46.7       H Reflex      Nerve Lat Hmax    ms   R Tibial - Soleus 31.6   L Tibial - Soleus 32. 0       EMG         EMG Summary Table     Spontaneous MUAP Recruitment   Muscle IA Fib PSW Fasc H.F. Amp Dur. PPP Pattern   L. Sacral paraspinals Incr None 1+ None None N N N N   L. Lumbar paraspinals (low) Incr None 1+ None None N N N N   L. Gluteus medius N None None None None N 1+ 1+ N   L.  Biceps femoris (short head) N None None None None N 2+ 1+ Sl Decr, Markedly Dec Vol Effort   L. Vastus lateralis N None None None None N N N Decr Vol Effort   L. Gastrocnemius (Medial head) N None None None None N 1+ 1+ Sl Decr w/erratic firing   L. Tibialis anterior N None None None None N 1+ 1+ Sl Decr   L. Flexor digitorum longus N None None None None N N Few Markedly Dec Vol Effort   L. Extensor hallucis longus N None None None None N 1+ Few Decr         Nerve conduction studies in both legs revealed the following abnormalities--- decreased compound motor action potentials in the left peroneal nerve. The F-wave latency of the left tibial nerve was prolonged. These findings were compared to the referential values in this laboratory, available upon request.    Monopolar needle examination of the left leg displayed chronic denervation changes, with reinnervation, in the muscle supplied primarily by the L5 and S1 motor roots. Needle testing of the paraspinals found acute denervation changes. Electrodiagnostic examination of both legs disclosed evidence diagnostic of left L5 and S1 motor radiculopathies or intracanalicular lesions. These were proven with the abnormal needle testing of the paraspinals. However, needle testing was limited due to the patient's intolerance of needle insertion. There were no other motor radiculopathies or intracanalicular lesions. There were no peripheral neuropathies. Sensory radiculopathies cannot be evaluated by limited diagnostic means. Clinically, the patient presented with back pains radiating into the left leg. On brief neurological examination, I found no motor or sensory compromise. He walked with an antalgic gait. His difficulties are related to his radicular disease. I recommend imaging studies of his lumbosacral spine. Clinical correlation was highly advised.

## 2020-03-19 ENCOUNTER — OFFICE VISIT (OUTPATIENT)
Dept: FAMILY MEDICINE CLINIC | Age: 50
End: 2020-03-19
Payer: COMMERCIAL

## 2020-03-19 VITALS
OXYGEN SATURATION: 100 % | HEART RATE: 90 BPM | SYSTOLIC BLOOD PRESSURE: 126 MMHG | TEMPERATURE: 98.4 F | WEIGHT: 154 LBS | BODY MASS INDEX: 23.34 KG/M2 | DIASTOLIC BLOOD PRESSURE: 88 MMHG | HEIGHT: 68 IN | RESPIRATION RATE: 16 BRPM

## 2020-03-19 PROCEDURE — 99213 OFFICE O/P EST LOW 20 MIN: CPT | Performed by: STUDENT IN AN ORGANIZED HEALTH CARE EDUCATION/TRAINING PROGRAM

## 2020-03-19 PROCEDURE — 99212 OFFICE O/P EST SF 10 MIN: CPT | Performed by: STUDENT IN AN ORGANIZED HEALTH CARE EDUCATION/TRAINING PROGRAM

## 2020-03-19 PROCEDURE — 20612 ASPIRATE/INJ GANGLION CYST: CPT | Performed by: STUDENT IN AN ORGANIZED HEALTH CARE EDUCATION/TRAINING PROGRAM

## 2020-03-19 RX ORDER — LISINOPRIL 10 MG/1
10 TABLET ORAL DAILY
Qty: 90 TABLET | Refills: 1 | Status: SHIPPED
Start: 2020-03-19 | End: 2020-04-21 | Stop reason: SDUPTHER

## 2020-03-19 RX ORDER — GABAPENTIN 300 MG/1
300 CAPSULE ORAL NIGHTLY
Qty: 30 CAPSULE | Refills: 1 | Status: SHIPPED
Start: 2020-03-19 | End: 2020-04-02

## 2020-03-19 ASSESSMENT — ENCOUNTER SYMPTOMS
SHORTNESS OF BREATH: 0
WHEEZING: 0
BACK PAIN: 1
ABDOMINAL PAIN: 0

## 2020-03-19 NOTE — PROGRESS NOTES
[address] [facility]  Department of Family Medicine  Family Medicine Residency Program      Patient:  Nadira Rocha 52 y.o. male     Date of Service: 3/12/20      Chief complaint:   Chief Complaint   Patient presents with    Cyst     left wrist          History of Present Illness   The patient is a 52 y.o. male who presented to the clinic for ganglion cyst aspiration and to discuss EMG results. Ganglion cyst on left wrist.  Recurrent, has been aspirated before. Patient was warned that there was a high likelihood that it may come back. It has come back and grown almost to the same size as previously. It is currently causing some discomfort on his wrist.    Pain in bilateral calves/lumbar radiculopathy: Has been hurting for intermittently for about 6 months. It is intermittent, no particular alleviation or aggravating factors. Not worse with ambulation. No history of immobility, no history of clotting disease, no recent travels. DVT US in 11/2019 was negative for DVT. Difficulty standing for long. It is affecting his job. Today he reports that he feels the radiation from his left lower back shooting down his left leg. EMG was consistent with lumbar radiculopathy and the neurologist recommended further imaging of the lumbar spine. HTN:  Patient was on lisinopril 10 mg however dose was increased at previous visit due to elevated BP. Patient denies any chest pain, dizziness, headaches. Patient reports that he was not able to  his new prescription for lisinopril 40 mg. He has been taking 10 mg. Today his BP is well controlled. Past Medical History:      Diagnosis Date    Hypertension        Past Surgical History:    No past surgical history on file. Allergies:    Patient has no known allergies.     Social History:   Social History     Socioeconomic History    Marital status: Single     Spouse name: Not on file    Number of children: Not on file    Years of education: Not on Current Outpatient Medications   Medication Sig Dispense Refill    lisinopril (PRINIVIL;ZESTRIL) 10 MG tablet Take 1 tablet by mouth daily 90 tablet 1    gabapentin (NEURONTIN) 300 MG capsule Take 1 capsule by mouth nightly for 60 days. 30 capsule 1    omeprazole (PRILOSEC) 40 MG delayed release capsule Take 1 capsule by mouth daily (with breakfast) 90 capsule 0    naproxen (NAPROXEN) 500 MG EC tablet Take 1 tablet by mouth 2 times daily (with meals) 60 tablet 2    benzonatate (TESSALON) 100 MG capsule Take 1-2 capsules by mouth 3 times daily as needed for Cough 60 capsule 0     No current facility-administered medications for this visit.          Sheila Holloway MD     Electronically signed by Sheila Holloway MD on 3/19/2020 at 7:48 PM

## 2020-04-01 ENCOUNTER — TELEPHONE (OUTPATIENT)
Dept: FAMILY MEDICINE CLINIC | Age: 50
End: 2020-04-01

## 2020-04-01 NOTE — TELEPHONE ENCOUNTER
I attempted to reach the patient by phone and was not able to reach him by using both the number he called us with and the number on his chart. If patient calls again, please ask him to share his question about the medication with you. And also please ask him to update his contact info.     Nidhi Perry MD

## 2020-04-02 ENCOUNTER — TELEPHONE (OUTPATIENT)
Dept: FAMILY MEDICINE CLINIC | Age: 50
End: 2020-04-02

## 2020-04-02 NOTE — TELEPHONE ENCOUNTER
S: Luigi Morales 52 y.o. male  here for Virtual Visit (TelepHone Encounter). Chief Complaint: LBP with sciatica  HPI: Longstanding. Symptoms included nonspecific bilateral lower extremity pain with radiation that became progressive. Workup included DVT R/O (negative). EMG done and remarkable for findings suggestive of lumbar radiculopathy. Recommendations included MRI. No previous trial of medications (ie gabapentin) or PT. Due to COVID-19 pandemic, no PT or MRI to date. Has not responded to gabapentin 300 mg QHS. Gabapentin was tolerated well without adverse effects; requesting dosage increase. O: VS: There were no vitals taken for this visit. Telephone encounter; no PE performed       Assessment / Plan:      Diagnoses and all orders for this visit:    Lumbar radiculopathy  -     gabapentin (NEURONTIN) 300 MG capsule; Take 1 capsule by mouth 3 times daily for 60 days. Lumbar radiculopathy: increase gabapentin 300 mg TID. Reinforced engaging in HEP recommendations made at previous visit. Once pandemic passes, trial of PT; if unsuccessful, then MRI        No follow-ups on file. F/U 4 weeks (tentative face to face)    Attending Physician Statement  I have discussed the case, including pertinent history and exam findings with the resident. I agree with the documented assessment and plan.          Harriet Renteria MD

## 2020-04-06 ENCOUNTER — TELEPHONE (OUTPATIENT)
Dept: FAMILY MEDICINE CLINIC | Age: 50
End: 2020-04-06

## 2020-04-06 PROBLEM — R19.7 NAUSEA VOMITING AND DIARRHEA: Status: RESOLVED | Noted: 2018-01-13 | Resolved: 2020-04-06

## 2020-04-06 PROBLEM — R10.10 PAIN OF UPPER ABDOMEN: Status: RESOLVED | Noted: 2018-01-13 | Resolved: 2020-04-06

## 2020-04-06 PROBLEM — M54.16 LUMBAR RADICULOPATHY: Status: ACTIVE | Noted: 2020-04-06

## 2020-04-06 PROBLEM — R11.2 NAUSEA VOMITING AND DIARRHEA: Status: RESOLVED | Noted: 2018-01-13 | Resolved: 2020-04-06

## 2020-04-06 PROBLEM — I10 ESSENTIAL HYPERTENSION: Status: ACTIVE | Noted: 2020-04-06

## 2020-04-06 NOTE — TELEPHONE ENCOUNTER
I attempted to call the patient back but unsuccessfully. I made a referral to pain specialist (Dr. Eden Dos Santos). Sorry for enduring that treatment. I will attempt to reach him again tomorrow and explain that such behavior is not appropriate or acceptable here.      Thanks

## 2020-04-09 ENCOUNTER — TELEPHONE (OUTPATIENT)
Dept: FAMILY MEDICINE CLINIC | Age: 50
End: 2020-04-09

## 2020-04-09 RX ORDER — LIDOCAINE 4 G/G
1 PATCH TOPICAL DAILY
Qty: 30 PATCH | Refills: 0 | Status: SHIPPED
Start: 2020-04-09 | End: 2020-04-13 | Stop reason: ALTCHOICE

## 2020-04-09 RX ORDER — ACETAMINOPHEN 500 MG
500 TABLET ORAL 4 TIMES DAILY PRN
Qty: 360 TABLET | Refills: 1 | Status: SHIPPED
Start: 2020-04-09 | End: 2021-02-17

## 2020-04-09 RX ORDER — IBUPROFEN 800 MG/1
800 TABLET ORAL 4 TIMES DAILY PRN
Qty: 360 TABLET | Refills: 1 | Status: SHIPPED
Start: 2020-04-09 | End: 2021-02-17

## 2020-04-13 RX ORDER — LIDOCAINE 50 MG/G
1 PATCH TOPICAL DAILY
Qty: 10 PATCH | Refills: 0 | Status: SHIPPED | OUTPATIENT
Start: 2020-04-13 | End: 2020-04-23

## 2020-04-13 NOTE — TELEPHONE ENCOUNTER
I called the patient back and explained that submitted a new order for lidocaine patch 5% and if he gets rejected we will process the prior off. Patient endorses understanding. Thank you!

## 2020-04-21 RX ORDER — OMEPRAZOLE 40 MG/1
40 CAPSULE, DELAYED RELEASE ORAL
Qty: 90 CAPSULE | Refills: 0 | Status: SHIPPED
Start: 2020-04-21 | End: 2021-02-17

## 2020-04-21 RX ORDER — LISINOPRIL 10 MG/1
10 TABLET ORAL DAILY
Qty: 90 TABLET | Refills: 1 | Status: SHIPPED
Start: 2020-04-21 | End: 2020-11-09

## 2020-05-11 ENCOUNTER — VIRTUAL VISIT (OUTPATIENT)
Dept: FAMILY MEDICINE CLINIC | Age: 50
End: 2020-05-11
Payer: COMMERCIAL

## 2020-05-11 VITALS — HEIGHT: 69 IN | BODY MASS INDEX: 23.25 KG/M2 | WEIGHT: 157 LBS

## 2020-05-11 PROCEDURE — 99212 OFFICE O/P EST SF 10 MIN: CPT | Performed by: STUDENT IN AN ORGANIZED HEALTH CARE EDUCATION/TRAINING PROGRAM

## 2020-05-11 RX ORDER — GABAPENTIN 300 MG/1
300 CAPSULE ORAL 3 TIMES DAILY
Qty: 90 CAPSULE | Refills: 1 | Status: SHIPPED
Start: 2020-05-11 | End: 2020-05-28

## 2020-05-11 RX ORDER — LIDOCAINE 50 MG/G
PATCH TOPICAL
COMMUNITY
Start: 2020-04-29 | End: 2021-02-17

## 2020-05-13 ENCOUNTER — TELEPHONE (OUTPATIENT)
Dept: FAMILY MEDICINE CLINIC | Age: 50
End: 2020-05-13

## 2020-05-13 NOTE — TELEPHONE ENCOUNTER
Patient called and stated he wanted you to call him. He has questions about some paperwork that is needed about is condition and his work position and his benefits.    Thanks, Whit MEJIA

## 2020-05-28 ENCOUNTER — OFFICE VISIT (OUTPATIENT)
Dept: PHYSICAL MEDICINE AND REHAB | Age: 50
End: 2020-05-28
Payer: COMMERCIAL

## 2020-05-28 VITALS
HEIGHT: 69 IN | DIASTOLIC BLOOD PRESSURE: 84 MMHG | SYSTOLIC BLOOD PRESSURE: 132 MMHG | WEIGHT: 159 LBS | BODY MASS INDEX: 23.55 KG/M2

## 2020-05-28 PROCEDURE — G8427 DOCREV CUR MEDS BY ELIG CLIN: HCPCS | Performed by: PHYSICAL MEDICINE & REHABILITATION

## 2020-05-28 PROCEDURE — 99204 OFFICE O/P NEW MOD 45 MIN: CPT | Performed by: PHYSICAL MEDICINE & REHABILITATION

## 2020-05-28 PROCEDURE — G8420 CALC BMI NORM PARAMETERS: HCPCS | Performed by: PHYSICAL MEDICINE & REHABILITATION

## 2020-05-28 PROCEDURE — 1036F TOBACCO NON-USER: CPT | Performed by: PHYSICAL MEDICINE & REHABILITATION

## 2020-05-28 RX ORDER — GABAPENTIN 600 MG/1
600 TABLET ORAL 3 TIMES DAILY
Qty: 90 TABLET | Refills: 2 | Status: SHIPPED
Start: 2020-05-28 | End: 2020-08-31

## 2020-05-28 RX ORDER — METHYLPREDNISOLONE 4 MG/1
TABLET ORAL
Qty: 1 KIT | Refills: 0 | Status: SHIPPED
Start: 2020-05-28 | End: 2020-06-01 | Stop reason: SINTOL

## 2020-05-28 NOTE — PROGRESS NOTES
lymphadenopathy. Skin: No rashes or ecchymosis. Normal turgor. Psych: Mood is calm. Affect is normal.   Ext: No edema noted     MSK:   Back/Hip Exam:   Inspection: Pelvis was symmetric. Lumbar lordotic curvature was decreased. There was no scoliosis. No ecchymoses or erythema. Palpation: Palpatory exam revealed tenderness along lumbosacral paraspinals, midline spine, no ttp SI joint sulcus, greater trochanters and TFL on both side. There was paraspinal spasms. There were no trigger points. ROM: ROM decreased  Special/provocative testing:   Supine SLR positive on right    Right leg atrophy     Neurological Exam:  Strength:   R  L  Hip Flex  5  5  Knee Ext  5  5  Ankle dorsi  5  5  EHL   5 5  Ankle Plantar  5  5    Sensory:  Decreased sensation LT right L5 dermatome     Reflexes:   R  L  Patellar  (2+) (2+)  Ankle Jerk  (0) (1+)    Gait is Antalgic. Imaging: (personally reviewed by me 05/28/20)  EMG   X-ray     Impression:   Tammi Dunham is a 52 y.o. male     1. Chronic bilateral low back pain with right-sided sciatica    2. Lumbar radiculopathy        Plan:   · Obtain MRI L spine to evaluate for L5 radiculopathy. Abnormal EMG. · Refer to PT   · Increase neurontin to 600mg TID   · Start medrol dose maura   · He is aware we are not pain management but physical medicine and rehabilitation and do not prescribe chronic narcotics at this office.  The patient was educated about the diagnosis, prognosis, indications, risks and benefits of treatment. An opportunity to ask questions was given to the patient and questions were answered. The patient agreed to proceed with the recommended treatment as described above.  Follow up after MRI      Thank you for the consultation and for allowing me to participate in the care of this patient.         Sincerely,     Hiral Valadez DO, [facility]   Board Certified Physical Medicine and Rehabilitation

## 2020-06-01 ENCOUNTER — TELEPHONE (OUTPATIENT)
Dept: PHYSICAL MEDICINE AND REHAB | Age: 50
End: 2020-06-01

## 2020-06-09 ENCOUNTER — TELEPHONE (OUTPATIENT)
Dept: PHYSICAL THERAPY | Age: 50
End: 2020-06-09

## 2020-06-09 PROBLEM — M54.41 CHRONIC BILATERAL LOW BACK PAIN WITH RIGHT-SIDED SCIATICA: Status: ACTIVE | Noted: 2020-06-09

## 2020-06-09 PROBLEM — G89.29 CHRONIC BILATERAL LOW BACK PAIN WITH RIGHT-SIDED SCIATICA: Status: ACTIVE | Noted: 2020-06-09

## 2020-06-23 ENCOUNTER — HOSPITAL ENCOUNTER (OUTPATIENT)
Dept: MRI IMAGING | Age: 50
Discharge: HOME | End: 2020-06-25
Payer: COMMERCIAL

## 2020-06-23 PROCEDURE — 72148 MRI LUMBAR SPINE W/O DYE: CPT

## 2020-06-26 ENCOUNTER — TELEPHONE (OUTPATIENT)
Dept: FAMILY MEDICINE CLINIC | Age: 50
End: 2020-06-26

## 2020-06-26 NOTE — TELEPHONE ENCOUNTER
Updated pt regarding bulging discs and foraminal narrowing noted on MRI. Will place referral to neurosurgery. Please schedule pt with neurosurgery, thank you!

## 2020-06-30 ENCOUNTER — TELEPHONE (OUTPATIENT)
Dept: PHYSICAL MEDICINE AND REHAB | Age: 50
End: 2020-06-30

## 2020-07-07 ENCOUNTER — OFFICE VISIT (OUTPATIENT)
Dept: PHYSICAL MEDICINE AND REHAB | Age: 50
End: 2020-07-07
Payer: COMMERCIAL

## 2020-07-07 VITALS
HEART RATE: 88 BPM | SYSTOLIC BLOOD PRESSURE: 130 MMHG | BODY MASS INDEX: 22.36 KG/M2 | DIASTOLIC BLOOD PRESSURE: 76 MMHG | WEIGHT: 151 LBS | HEIGHT: 69 IN

## 2020-07-07 PROCEDURE — G8427 DOCREV CUR MEDS BY ELIG CLIN: HCPCS | Performed by: PHYSICAL MEDICINE & REHABILITATION

## 2020-07-07 PROCEDURE — 1036F TOBACCO NON-USER: CPT | Performed by: PHYSICAL MEDICINE & REHABILITATION

## 2020-07-07 PROCEDURE — G8420 CALC BMI NORM PARAMETERS: HCPCS | Performed by: PHYSICAL MEDICINE & REHABILITATION

## 2020-07-07 PROCEDURE — 99214 OFFICE O/P EST MOD 30 MIN: CPT | Performed by: PHYSICAL MEDICINE & REHABILITATION

## 2020-07-07 NOTE — PROGRESS NOTES
Margi Ojeda, [address] [facility]  [address] [facility] [facility] [place]  Phone: [phone]  Fax: [phone]    PCP: Gi Mejia MD  Date of visit: 7/7/20    Chief Complaint   Patient presents with    Lower Back Pain     follow up, discuaa MRI Lumbar spine results     Interval:   Patient presents today to review MRI. He has continued low back and right leg pain. The pain is rated Pain Score:  10 - Worst pain ever, is described as shooting, burning, and is located in the low back with radiation to the right leg- L5 distribution. The pain is better with nothing. The pain is worse with everything. There is associated numbness/tingling in right leg- L5. There is weakness. There is no bowel/bladder changes. He did not go to PT because he feels it's not going to help him. The prior workup has included: Xray, EMG. The prior treatment has included:  PT: none   Chiropractic: none    Modalities: none   OTC Tylenol: none   NSAIDS: ibuprofen with no relief   Opioids: none    Membrane stabilizers: neurontin 600mg TID with no relief   Muscle relaxers: none    Previous injections: none    Previous surgery at this site: none    Allergies   Allergen Reactions    Medrol [Methylprednisolone] Diarrhea and Other (See Comments)     Severe GI upset, bloody stool, dry skin, shakiness       Current Outpatient Medications   Medication Sig Dispense Refill    gabapentin (NEURONTIN) 600 MG tablet Take 1 tablet by mouth 3 times daily for 30 days.  90 tablet 2    lidocaine (LIDODERM) 5 % apply 1 patch daily for 12 hours to off for 12 hours      lisinopril (PRINIVIL;ZESTRIL) 10 MG tablet Take 1 tablet by mouth daily 90 tablet 1    omeprazole (PRILOSEC) 40 MG delayed release capsule Take 1 capsule by mouth daily (with breakfast) 90 capsule 0    acetaminophen (TYLENOL) 500 MG tablet Take 1 tablet by mouth 4 times daily as needed for Pain 360 tablet 1    ibuprofen (ADVIL;MOTRIN) 800 MG tablet Take 1 tablet by mouth 4 times daily as needed for Pain 360 tablet 1     No current facility-administered medications for this visit. Past Medical History:   Diagnosis Date    Essential hypertension 4/6/2020    Lumbar radiculopathy 4/6/2020       History reviewed. No pertinent surgical history. History reviewed. No pertinent family history. Social History     Tobacco Use    Smoking status: Never Smoker    Smokeless tobacco: Never Used   Substance Use Topics    Alcohol use: Yes    Drug use: No          Functional Status: The patient is able to ambulate and perform activities of daily living without the use of an assistive device. ROS:    Constitutional: Denies fevers, chills, night sweats, unintentional weight loss     Skin: Denies rash or skin changes     Eyes: Denies vision changes    Ears/Nose/Throat: Denies nasal congestion or sore throat     Respiratory: Denies SOB or cough     Cardiovascular: Denies CP, palpitations, edema      Gastrointestinal: Denies abdominal pain,  N/V, constipation, or diarrhea    Genitourinary: Denies urinary symptoms    Neurologic: See HPI.     MSK: See HPI. Psychiatric: +sleep disturbance, denies anxiety, depression    Hematologic/Lymphatic/Immunologic: Denies bruising       Physical Exam:   Blood pressure 130/76, pulse 88, height 5' 9\" (1.753 m), weight 151 lb (68.5 kg). General: well developed and well nourished in no acute distress. Body habitus is thin  HEENT: No rhinorrhea, sneezing, yawning, or lacrimation. No scleral icterus or conjunctival injection. Resp: symmetrical chest expansion, unlabored breathing, respirations unlabored. CV: Heart rate is regular. Peripheral pulses are palpable  Lymph: No visible regional lymphadenopathy. Skin: No rashes or ecchymosis. Normal turgor. Psych: Mood is calm. Affect is normal.   Ext: No edema noted     MSK:   Back/Hip Exam:   Inspection: Pelvis was symmetric. Lumbar lordotic curvature was decreased. There was no scoliosis. No ecchymoses or erythema. Palpation: Palpatory exam revealed tenderness along lumbosacral paraspinals, midline spine, no ttp SI joint sulcus, greater trochanters and TFL on both side. There was paraspinal spasms. There were no trigger points. ROM: ROM decreased  Special/provocative testing:   Supine SLR positive on right    Neurological Exam:  Strength:   R  L  Hip Flex  5  5  Knee Ext  5  5  Ankle dorsi  5  5  EHL   5 5  Ankle Plantar  5  5    Sensory:  Decreased sensation LT right L5 dermatome     Reflexes:   R  L  Patellar  (2+) (2+)  Ankle Jerk  (0) (1+)    Gait is Antalgic. Imaging: (personally reviewed by me 07/07/20)  EMG   X-ray   MRI L spine     Impression:   Francesca Landaverde is a 52 y.o. male     1. Lumbar radiculopathy    2. Chronic bilateral low back pain with right-sided sciatica        Plan:   · Recommend physical therapy. Number provided to reschedule. · Patient would benefit from right L5-S1 TFESI. Procedure risks, benefits and alternatives were discussed. Patient would like to proceed. · MRI reviewed. · He is aware we do not prescribe chronic narcotics at this office.  The patient was educated about the diagnosis, prognosis, indications, risks and benefits of treatment. An opportunity to ask questions was given to the patient and questions were answered. The patient agreed to proceed with the recommended treatment as described above.  Follow up after injection         Kim Chacon DO, FAAPMR   Board Certified Physical Medicine and Rehabilitation    The patient was counseled at length about the risks of lila Covid-19 during their perioperative period and any recovery window from their procedure. The patient was made aware that lila Covid-19  may worsen their prognosis for recovering from their procedure  and lend to a higher morbidity and/or mortality risk.   All material risks,

## 2020-07-07 NOTE — H&P
Trevon Watson, [address] [facility] Medicine [facility] Rehabilitation  [address] [facility] [place]  Phone: [phone]  Fax: [phone]    PCP: Pelon Vazquez MD  Date of visit: 7/7/20    Chief Complaint   Patient presents with    Lower Back Pain     follow up, discuaa MRI Lumbar spine results     Interval:   Patient presents today to review MRI. He has continued low back and right leg pain. The pain is rated Pain Score:  10 - Worst pain ever, is described as shooting, burning, and is located in the low back with radiation to the right leg- L5 distribution. The pain is better with nothing. The pain is worse with everything. There is associated numbness/tingling in right leg- L5. There is weakness. There is no bowel/bladder changes. He did not go to PT because he feels it's not going to help him. The prior workup has included: Xray, EMG. The prior treatment has included:  PT: none   Chiropractic: none    Modalities: none   OTC Tylenol: none   NSAIDS: ibuprofen with no relief   Opioids: none    Membrane stabilizers: neurontin 600mg TID with no relief   Muscle relaxers: none    Previous injections: none    Previous surgery at this site: none    Allergies   Allergen Reactions    Medrol [Methylprednisolone] Diarrhea and Other (See Comments)     Severe GI upset, bloody stool, dry skin, shakiness       Current Outpatient Medications   Medication Sig Dispense Refill    gabapentin (NEURONTIN) 600 MG tablet Take 1 tablet by mouth 3 times daily for 30 days.  90 tablet 2    lidocaine (LIDODERM) 5 % apply 1 patch daily for 12 hours to off for 12 hours      lisinopril (PRINIVIL;ZESTRIL) 10 MG tablet Take 1 tablet by mouth daily 90 tablet 1    omeprazole (PRILOSEC) 40 MG delayed release capsule Take 1 capsule by mouth daily (with breakfast) 90 capsule 0    acetaminophen (TYLENOL) 500 MG tablet Take 1 tablet by mouth 4 times daily as needed for Pain 360 tablet 1    ibuprofen (ADVIL;MOTRIN) 800 MG tablet Take 1 tablet by mouth 4 times daily as needed for Pain 360 tablet 1     No current facility-administered medications for this visit. Past Medical History:   Diagnosis Date    Essential hypertension 4/6/2020    Lumbar radiculopathy 4/6/2020       History reviewed. No pertinent surgical history. History reviewed. No pertinent family history. Social History     Tobacco Use    Smoking status: Never Smoker    Smokeless tobacco: Never Used   Substance Use Topics    Alcohol use: Yes    Drug use: No          Functional Status: The patient is able to ambulate and perform activities of daily living without the use of an assistive device. ROS:    Constitutional: Denies fevers, chills, night sweats, unintentional weight loss     Skin: Denies rash or skin changes     Eyes: Denies vision changes    Ears/Nose/Throat: Denies nasal congestion or sore throat     Respiratory: Denies SOB or cough     Cardiovascular: Denies CP, palpitations, edema      Gastrointestinal: Denies abdominal pain,  N/V, constipation, or diarrhea    Genitourinary: Denies urinary symptoms    Neurologic: See HPI.     MSK: See HPI. Psychiatric: +sleep disturbance, denies anxiety, depression    Hematologic/Lymphatic/Immunologic: Denies bruising       Physical Exam:   Blood pressure 130/76, pulse 88, height 5' 9\" (1.753 m), weight 151 lb (68.5 kg). General: well developed and well nourished in no acute distress. Body habitus is thin  HEENT: No rhinorrhea, sneezing, yawning, or lacrimation. No scleral icterus or conjunctival injection. Resp: symmetrical chest expansion, unlabored breathing, respirations unlabored. CV: Heart rate is regular. Peripheral pulses are palpable  Lymph: No visible regional lymphadenopathy. Skin: No rashes or ecchymosis. Normal turgor. Psych: Mood is calm. Affect is normal.   Ext: No edema noted     MSK:   Back/Hip Exam:   Inspection: Pelvis was symmetric. Lumbar lordotic curvature was decreased. There was no scoliosis. No ecchymoses or erythema. Palpation: Palpatory exam revealed tenderness along lumbosacral paraspinals, midline spine, no ttp SI joint sulcus, greater trochanters and TFL on both side. There was paraspinal spasms. There were no trigger points. ROM: ROM decreased  Special/provocative testing:   Supine SLR positive on right    Neurological Exam:  Strength:   R  L  Hip Flex  5  5  Knee Ext  5  5  Ankle dorsi  5  5  EHL   5 5  Ankle Plantar  5  5    Sensory:  Decreased sensation LT right L5 dermatome     Reflexes:   R  L  Patellar  (2+) (2+)  Ankle Jerk  (0) (1+)    Gait is Antalgic. Imaging: (personally reviewed by me 07/07/20)  EMG   X-ray   MRI L spine     Impression:   Shantal Lang is a 52 y.o. male     1. Lumbar radiculopathy    2. Chronic bilateral low back pain with right-sided sciatica        Plan:   · Recommend physical therapy. Number provided to reschedule. · Patient would benefit from right L5-S1 TFESI. Procedure risks, benefits and alternatives were discussed. Patient would like to proceed. · MRI reviewed. · He is aware we do not prescribe chronic narcotics at this office.  The patient was educated about the diagnosis, prognosis, indications, risks and benefits of treatment. An opportunity to ask questions was given to the patient and questions were answered. The patient agreed to proceed with the recommended treatment as described above.  Follow up after injection         Lalita Mccormack DO, FAAPMR   Board Certified Physical Medicine and Rehabilitation    The patient was counseled at length about the risks of lila Covid-19 during their perioperative period and any recovery window from their procedure. The patient was made aware that lila Covid-19  may worsen their prognosis for recovering from their procedure  and lend to a higher morbidity and/or mortality risk.   All material risks, benefits, and reasonable alternatives including postponing the procedure were discussed. The patient does wish to proceed with the procedure at this time.

## 2020-07-21 ENCOUNTER — TELEPHONE (OUTPATIENT)
Dept: PHYSICAL MEDICINE AND REHAB | Age: 50
End: 2020-07-21

## 2020-07-21 NOTE — TELEPHONE ENCOUNTER
Please let patient know that insurance will not approve the injection unless he does PT for 6 weeks.

## 2020-07-22 ENCOUNTER — INITIAL CONSULT (OUTPATIENT)
Dept: NEUROSURGERY | Age: 50
End: 2020-07-22
Payer: COMMERCIAL

## 2020-07-22 VITALS
SYSTOLIC BLOOD PRESSURE: 152 MMHG | DIASTOLIC BLOOD PRESSURE: 103 MMHG | HEIGHT: 69 IN | HEART RATE: 89 BPM | TEMPERATURE: 98.4 F | BODY MASS INDEX: 23.25 KG/M2 | WEIGHT: 157 LBS

## 2020-07-22 PROCEDURE — G8420 CALC BMI NORM PARAMETERS: HCPCS | Performed by: PHYSICIAN ASSISTANT

## 2020-07-22 PROCEDURE — G8427 DOCREV CUR MEDS BY ELIG CLIN: HCPCS | Performed by: PHYSICIAN ASSISTANT

## 2020-07-22 PROCEDURE — 1036F TOBACCO NON-USER: CPT | Performed by: PHYSICIAN ASSISTANT

## 2020-07-22 PROCEDURE — 99213 OFFICE O/P EST LOW 20 MIN: CPT | Performed by: PHYSICIAN ASSISTANT

## 2020-07-22 ASSESSMENT — ENCOUNTER SYMPTOMS
BACK PAIN: 1
PHOTOPHOBIA: 0
TROUBLE SWALLOWING: 0
ABDOMINAL PAIN: 0
SHORTNESS OF BREATH: 0

## 2020-07-22 NOTE — PROGRESS NOTES
equal, round, and reactive to light. Neck:      Musculoskeletal: Normal range of motion and neck supple. Cardiovascular:      Rate and Rhythm: Normal rate. Pulmonary:      Effort: Pulmonary effort is normal.   Abdominal:      General: There is no distension. Musculoskeletal:      Comments: Pain with ROM and palpation of lumbar spine  Antalgic gait   Skin:     General: Skin is warm and dry. Neurological:      Mental Status: He is alert. Comments: Alert and oriented x3  CN3-12 intact  Upper strength and LLE strength full  Limited movement of RLE secondary to pain  Decreased sensation top of right foot, otherwise intact to LT  Reflexes normal    Psychiatric:         Thought Content: Thought content normal.         Assessment: This is a 52year old male presenting with low back pain and right leg pain/numbness. No PT or TIM. Plan:      -Pt has not exhausted all conservative treatments.  Not a surgical candidate at this time  -Referral for PT  -TFESI when approved by insurance  -OARRS report reviewed  -Call/return to Neurosurgery clinic if fails conservative therapy  -Call/return sooner if symptoms worsen or new issues arise in the interim           Hanh Aguilar PA-C

## 2020-07-22 NOTE — TELEPHONE ENCOUNTER
Ok, I am sure they will want him to at least try conservative treatment before they consider any surgery. Await call back from him .

## 2020-08-12 ENCOUNTER — OFFICE VISIT (OUTPATIENT)
Dept: PHYSICAL MEDICINE AND REHAB | Age: 50
End: 2020-08-12
Payer: COMMERCIAL

## 2020-08-12 VITALS
BODY MASS INDEX: 22.81 KG/M2 | WEIGHT: 154 LBS | DIASTOLIC BLOOD PRESSURE: 98 MMHG | HEIGHT: 69 IN | SYSTOLIC BLOOD PRESSURE: 150 MMHG | HEART RATE: 81 BPM

## 2020-08-12 PROCEDURE — 1036F TOBACCO NON-USER: CPT | Performed by: PHYSICAL MEDICINE & REHABILITATION

## 2020-08-12 PROCEDURE — G8427 DOCREV CUR MEDS BY ELIG CLIN: HCPCS | Performed by: PHYSICAL MEDICINE & REHABILITATION

## 2020-08-12 PROCEDURE — 99212 OFFICE O/P EST SF 10 MIN: CPT | Performed by: PHYSICAL MEDICINE & REHABILITATION

## 2020-08-12 PROCEDURE — G8420 CALC BMI NORM PARAMETERS: HCPCS | Performed by: PHYSICAL MEDICINE & REHABILITATION

## 2020-08-12 NOTE — PROGRESS NOTES
Kathia Blackwell, [address] [facility] Physical Medicine and Rehabilitation  [address] [facility] [place]  Phone: [phone]  Fax: [phone]    PCP: Ever Mckinney MD  Date of visit: 8/12/20    Chief Complaint   Patient presents with    Back Pain     FOLLOW UP    Leg Pain     Interval:   Patient presents today for office visit. He reports continued pain. Epidural was denied due to lack of PT as recommended by myself and NSGY. He has not scheduled yet. The pain is rated Pain Score:  10 - Worst pain ever. The prior workup has included: Xray, EMG. The prior treatment has included:  PT: none   Chiropractic: none    Modalities: none   OTC Tylenol: none   NSAIDS: ibuprofen with no relief   Opioids: none    Membrane stabilizers: neurontin 600mg TID with no relief   Muscle relaxers: none    Previous injections: none    Previous surgery at this site: none    Allergies   Allergen Reactions    Medrol [Methylprednisolone] Diarrhea and Other (See Comments)     Severe GI upset, bloody stool, dry skin, shakiness       Current Outpatient Medications   Medication Sig Dispense Refill    lidocaine (LIDODERM) 5 % apply 1 patch daily for 12 hours to off for 12 hours      lisinopril (PRINIVIL;ZESTRIL) 10 MG tablet Take 1 tablet by mouth daily 90 tablet 1    acetaminophen (TYLENOL) 500 MG tablet Take 1 tablet by mouth 4 times daily as needed for Pain 360 tablet 1    ibuprofen (ADVIL;MOTRIN) 800 MG tablet Take 1 tablet by mouth 4 times daily as needed for Pain 360 tablet 1    gabapentin (NEURONTIN) 600 MG tablet Take 1 tablet by mouth 3 times daily for 30 days. 90 tablet 2    omeprazole (PRILOSEC) 40 MG delayed release capsule Take 1 capsule by mouth daily (with breakfast) 90 capsule 0     No current facility-administered medications for this visit.         Past Medical History:   Diagnosis Date    Essential hypertension 4/6/2020    Lumbar radiculopathy 4/6/2020       History

## 2020-08-13 ENCOUNTER — TELEPHONE (OUTPATIENT)
Dept: PHYSICAL THERAPY | Age: 50
End: 2020-08-13

## 2020-08-31 RX ORDER — GABAPENTIN 600 MG/1
TABLET ORAL
Qty: 90 TABLET | Refills: 2 | Status: SHIPPED
Start: 2020-08-31 | End: 2021-02-17

## 2020-08-31 NOTE — TELEPHONE ENCOUNTER
Pharmacy is requesting refills for Gabapentin 600mg TID #90 with 2 refills. Last sent on 5/28/2020.  Patient last seen on 8/12/20

## 2020-09-01 ENCOUNTER — TELEPHONE (OUTPATIENT)
Dept: PHYSICAL THERAPY | Age: 50
End: 2020-09-01

## 2020-10-21 ENCOUNTER — HOSPITAL ENCOUNTER (OUTPATIENT)
Dept: PHYSICAL THERAPY | Age: 50
Setting detail: THERAPIES SERIES
Discharge: HOME | End: 2020-10-21
Payer: COMMERCIAL

## 2020-10-21 PROCEDURE — 97161 PT EVAL LOW COMPLEX 20 MIN: CPT | Performed by: PHYSICAL THERAPIST

## 2020-10-21 ASSESSMENT — PAIN - FUNCTIONAL ASSESSMENT: PAIN_FUNCTIONAL_ASSESSMENT: PREVENTS OR INTERFERES WITH ALL ACTIVE AND SOME PASSIVE ACTIVITIES

## 2020-10-21 ASSESSMENT — PAIN DESCRIPTION - PAIN TYPE: TYPE: CHRONIC PAIN

## 2020-10-21 ASSESSMENT — PAIN DESCRIPTION - LOCATION: LOCATION: BACK;LEG

## 2020-10-21 ASSESSMENT — PAIN DESCRIPTION - FREQUENCY: FREQUENCY: CONTINUOUS

## 2020-10-21 ASSESSMENT — PAIN SCALES - GENERAL: PAINLEVEL_OUTOF10: 8

## 2020-10-21 ASSESSMENT — PAIN DESCRIPTION - DESCRIPTORS: DESCRIPTORS: ACHING;CONSTANT;RADIATING

## 2020-10-21 ASSESSMENT — PAIN DESCRIPTION - ORIENTATION: ORIENTATION: RIGHT

## 2020-10-26 ENCOUNTER — HOSPITAL ENCOUNTER (OUTPATIENT)
Dept: PHYSICAL THERAPY | Age: 50
Setting detail: THERAPIES SERIES
Discharge: HOME | End: 2020-10-26
Payer: COMMERCIAL

## 2020-11-09 RX ORDER — LISINOPRIL 10 MG/1
TABLET ORAL
Qty: 30 TABLET | Refills: 0 | Status: SHIPPED
Start: 2020-11-09 | End: 2020-12-09

## 2020-11-11 ENCOUNTER — HOSPITAL ENCOUNTER (OUTPATIENT)
Dept: PHYSICAL THERAPY | Age: 50
Setting detail: THERAPIES SERIES
Discharge: HOME | End: 2020-11-11
Payer: COMMERCIAL

## 2020-11-11 PROCEDURE — 97110 THERAPEUTIC EXERCISES: CPT

## 2020-11-11 PROCEDURE — 97530 THERAPEUTIC ACTIVITIES: CPT

## 2020-11-11 NOTE — PROGRESS NOTES
[address] [facility]                Phone: [phone]   Fax: [phone]    Physical Therapy Daily Treatment Note  Date:  2020    Patient Name:  David Garcia    :  1970  MRN: [id number]    Evaluating therapist:  BELEM Nguyen              (10/21/20)  Restrictions/Precautions:    Diagnosis:  lumbar radiculopathy  Treatment Diagnosis:    Insurance/Certification information:  [id number] [facility]  Referring Practitioner:  Rachel Pemberton of care signed (Y/N):    Visit# / total visits:   -8  Pain level: 10/10     Time In:    1344  Time Out: 1508    Subjective:  Patient reports for first treatment session following initial evaluation and following cancel and no show at last weeks scheduled sessions. He reports pain in LB and down L LE 10/10. He reports numbness down R LE with times of shooting pain also. Exercises:  Exercise/Equipment Resistance/Repetitions During/After Other comments   StepOne   10 min L1             tball flex/rot  10x 5 sec ea           Prone lying 3 min increase/no worse          Prone lying with  3 min pillows under hips increase/no worse 2 pillows -> 1 -> 0         Prone on elbow 2 x10 increase/no worse          Prone press ups 3 x10 increase/no worse          Lumbar spine posterior/anterior mobilization x10 done by PT increase/no worse            rot st nt     SKTC nt     piriformis st nt             pelvic tilts nt               bridges nt           standing ext st nt                       Objective: Ther ex. as listed per flow sheet above. Gait with forward flexed posture with lateral flex to R. Assessment:  Sharp pain L LE noted in standing with attempt to stand upright correcting forward flexed posture. Side glide L  increased/no worse; Side glide R increased/worse L LE pain. Home Exercise Program:  Prone lying 3x day ~ 5 min;   Patient educated on Red Flag symptoms, patient verbalized understanding    Manual Treatments: Modalities:       Timed Code Treatment Minutes: Total Treatment Minutes:      Treatment/Activity Tolerance:  [] Patient tolerated treatment well [] Patient limited by fatigue  [x] Patient limited by pain  [] Patient limited by other medical complications  [] Other:     Prognosis: [] Good [] Fair  [] Poor    Patient Requires Follow-up: [x] Yes  [] No    Plan:   [x] Continue per plan of care [] Alter current plan (see comments)  [] Plan of care initiated [] Hold pending MD visit [] Discharge    Plan for Next Session:   Continue POC with progressions per patient tolerance.       See Weekly Progress Note: []  Yes  []  No  Next due:          Electronically signed by:  Usman Birmingham, PTA [id number]

## 2020-11-13 ENCOUNTER — HOSPITAL ENCOUNTER (OUTPATIENT)
Dept: PHYSICAL THERAPY | Age: 50
Setting detail: THERAPIES SERIES
Discharge: HOME | End: 2020-11-13
Payer: COMMERCIAL

## 2020-11-13 VITALS
HEART RATE: 95 BPM | RESPIRATION RATE: 12 BRPM | TEMPERATURE: 97.7 F | SYSTOLIC BLOOD PRESSURE: 133 MMHG | BODY MASS INDEX: 25.01 KG/M2 | OXYGEN SATURATION: 98 % | DIASTOLIC BLOOD PRESSURE: 86 MMHG | WEIGHT: 165 LBS | HEIGHT: 68 IN

## 2020-11-13 LAB
ALBUMIN SERPL-MCNC: 4.3 G/DL (ref 3.5–5.2)
ALP BLD-CCNC: 43 U/L (ref 40–129)
ALT SERPL-CCNC: 28 U/L (ref 0–40)
ANION GAP SERPL CALCULATED.3IONS-SCNC: 10 MMOL/L (ref 7–16)
AST SERPL-CCNC: 20 U/L (ref 0–39)
BASOPHILS ABSOLUTE: 0.1 E9/L (ref 0–0.2)
BASOPHILS RELATIVE PERCENT: 1.8 % (ref 0–2)
BILIRUB SERPL-MCNC: <0.2 MG/DL (ref 0–1.2)
BUN BLDV-MCNC: 16 MG/DL (ref 6–20)
CALCIUM SERPL-MCNC: 8.8 MG/DL (ref 8.6–10.2)
CHLORIDE BLD-SCNC: 105 MMOL/L (ref 98–107)
CO2: 25 MMOL/L (ref 22–29)
CREAT SERPL-MCNC: 1.3 MG/DL (ref 0.7–1.2)
EOSINOPHILS ABSOLUTE: 0.06 E9/L (ref 0.05–0.5)
EOSINOPHILS RELATIVE PERCENT: 1.1 % (ref 0–6)
GFR AFRICAN AMERICAN: >60
GFR NON-AFRICAN AMERICAN: >60 ML/MIN/1.73
GLUCOSE BLD-MCNC: 85 MG/DL (ref 74–99)
HCT VFR BLD CALC: 43.3 % (ref 37–54)
HEMOGLOBIN: 13.8 G/DL (ref 12.5–16.5)
IMMATURE GRANULOCYTES #: 0.08 E9/L
IMMATURE GRANULOCYTES %: 1.4 % (ref 0–5)
LYMPHOCYTES ABSOLUTE: 1.93 E9/L (ref 1.5–4)
LYMPHOCYTES RELATIVE PERCENT: 34.4 % (ref 20–42)
MCH RBC QN AUTO: 29.9 PG (ref 26–35)
MCHC RBC AUTO-ENTMCNC: 31.9 % (ref 32–34.5)
MCV RBC AUTO: 93.9 FL (ref 80–99.9)
MONOCYTES ABSOLUTE: 0.85 E9/L (ref 0.1–0.95)
MONOCYTES RELATIVE PERCENT: 15.2 % (ref 2–12)
NEUTROPHILS ABSOLUTE: 2.59 E9/L (ref 1.8–7.3)
NEUTROPHILS RELATIVE PERCENT: 46.1 % (ref 43–80)
PDW BLD-RTO: 14.5 FL (ref 11.5–15)
PLATELET # BLD: 297 E9/L (ref 130–450)
PMV BLD AUTO: 10.7 FL (ref 7–12)
POTASSIUM SERPL-SCNC: 3.9 MMOL/L (ref 3.5–5)
RBC # BLD: 4.61 E12/L (ref 3.8–5.8)
SODIUM BLD-SCNC: 140 MMOL/L (ref 132–146)
TOTAL PROTEIN: 7.6 G/DL (ref 6.4–8.3)
WBC # BLD: 5.6 E9/L (ref 4.5–11.5)

## 2020-11-13 PROCEDURE — 4500000002 HC ER NO CHARGE

## 2020-11-13 PROCEDURE — 80053 COMPREHEN METABOLIC PANEL: CPT

## 2020-11-13 PROCEDURE — 85025 COMPLETE CBC W/AUTO DIFF WBC: CPT

## 2020-11-13 PROCEDURE — 84484 ASSAY OF TROPONIN QUANT: CPT

## 2020-11-13 ASSESSMENT — PAIN DESCRIPTION - LOCATION: LOCATION: BACK;ARM;LEG

## 2020-11-13 ASSESSMENT — PAIN SCALES - GENERAL: PAINLEVEL_OUTOF10: 10

## 2020-11-13 ASSESSMENT — PAIN DESCRIPTION - ORIENTATION: ORIENTATION: LEFT

## 2020-11-13 NOTE — PROGRESS NOTES
[address] [facility]                Phone: [phone]   Fax: [phone]    Physical Therapy Daily Treatment Note  Date:  2020    Patient Name:  Michelle Reyes    :  1970  MRN: [id number]    Evaluating therapist:  BELEM Rubin              (10/21/20)  Restrictions/Precautions:    Diagnosis:  lumbar radiculopathy  Treatment Diagnosis:    Insurance/Certification information:  [id number] [facility],[address]  Referring Practitioner:  Talisha Yan signed (Y/N):    Visit# / total visits:   3/6-  Pain level: 7/10     Time In:  09  Time Out: 915     Subjective:  Patient reports that he attempted prone lying multiple times since last session with no significant change in low back and LLE pain/ numbness. He reports continued shooting pain in back and LLE. Exercises: (NA- see comments below)  Exercise/Equipment Resistance/Repetitions During/After Other comments   StepOne   10 min L1             tball flex/rot  10x 5 sec ea           Prone lying 3 min increase/no worse          Prone lying with  3 min pillows under hips increase/no worse 2 pillows -> 1 -> 0         Prone on elbow 2 x10 increase/no worse          Prone press ups 3 x10 increase/no worse          Lumbar spine posterior/anterior mobilization x10 done by PT increase/no worse            rot st nt     SKTC nt     piriformis st nt             pelvic tilts nt               bridges nt           standing ext st nt                       Comments:  PT recommending follow-up with neurosurgeon/ PMR due to worsening symptoms in LLE, continued constant low back and LLE pain, and lack of improvement/ change with physical therapy. Pt continues to be stuck in forward flexion while standing. Increased sharp pain in low back and down LLE with attempts at prone lying and posture correction in standing. No repeatable directional preference able to be identified at this time.   Pt reports that his symptoms were initially located in low back and down RLE a few months ago but since then have increased and now having pain down entire LLE at times. Discussed this at length with pt. Assessment:  Sharp pain L LE noted in standing with attempt to stand upright correcting forward flexed posture. Home Exercise Program:  Prone lying 3x day ~ 5 min;   Patient educated on Red Flag symptoms, patient verbalized understanding    Manual Treatments:      Modalities:       Timed Code Treatment Minutes:  15    Total Treatment Minutes:  15    Treatment/Activity Tolerance:  [] Patient tolerated treatment well [] Patient limited by fatigue  [x] Patient limited by pain  [] Patient limited by other medical complications  [] Other:     Prognosis: [] Good [] Fair  [x] Poor    Patient Requires Follow-up: [x] Yes  [] No    Plan:   [x] Continue per plan of care [] Alter current plan (see comments)  [] Plan of care initiated [x] Hold pending referral back to neurosurgery vs. PMR [] Discharge    Plan for Next Session:     Hold from PT with referral back to neurosurgery vs. PMR       See Weekly Progress Note: []  Yes  [x]  No  Next due:          Electronically signed by:    Tamika Pryor DPT  [id number]

## 2020-11-14 ENCOUNTER — HOSPITAL ENCOUNTER (EMERGENCY)
Age: 50
Discharge: HOME | End: 2020-11-14
Payer: COMMERCIAL

## 2020-11-14 LAB — TROPONIN: <0.01 NG/ML (ref 0–0.03)

## 2020-11-14 NOTE — ED NOTES
FIRST PROVIDER CONTACT ASSESSMENT NOTE      Department of Emergency Medicine   ED  First Provider Note   11/13/20  9:50 PM EST    Chief Complaint: Back Pain ((lower area and whole L side pain) ) and Extremity Weakness ((L arm))      History of Present Illness:    Soahn Freeman is a 52 y.o. male who presents to the ED by private car for Left arm leg and arm weakness. Patient reports symptoms started weeks ago. States that it is related to his chronic lumbar radiculopathy. Patient reports pain originally started on the right side but now is now on the left side but he does have notable weakness to his left upper left lower extremity  Focused Screening Exam:  Constitutional:  Alert, appears stated age and is in no distress.       *ALLERGIES*     Medrol [methylprednisolone]     ED Triage Vitals   BP Temp Temp src Pulse Resp SpO2 Height Weight   11/13/20 2129 11/13/20 2105 -- 11/13/20 2105 11/13/20 2129 11/13/20 2105 11/13/20 2129 11/13/20 2129   (!) 150/117 97.7 °F (36.5 °C)  94 12 96 % 5' 11\" (1.803 m) 220 lb (99.8 kg)        Initial Plan of Care:  Initiate Treatment-Testing, Proceed toTreatment Area When Bed Available for ED Attending/MLP to Continue Care      -----------------END OF FIRST PROVIDER CONTACT ASSESSMENT NOTE--------------  Electronically signed by HONG Atkins CNP   DD: 11/13/20         HONG Atkins CNP  11/13/20 0248

## 2020-11-14 NOTE — ED NOTES
Patient refused to lay down on cart in triage for EKG. Patient was offered to have EKG done while sitting up in lab chair but states it is too painful to sit any longer for the test to be performed. Triage NP and  RN notified.      Antwon Nesbitt LPN  06/45/89 6006

## 2020-11-17 ENCOUNTER — APPOINTMENT (OUTPATIENT)
Dept: PHYSICAL THERAPY | Age: 50
End: 2020-11-17
Payer: COMMERCIAL

## 2020-11-18 ENCOUNTER — APPOINTMENT (OUTPATIENT)
Dept: PHYSICAL THERAPY | Age: 50
End: 2020-11-18
Payer: COMMERCIAL

## 2020-11-20 ENCOUNTER — APPOINTMENT (OUTPATIENT)
Dept: PHYSICAL THERAPY | Age: 50
End: 2020-11-20
Payer: COMMERCIAL

## 2020-12-10 RX ORDER — LISINOPRIL 10 MG/1
TABLET ORAL
Qty: 30 TABLET | Refills: 2 | Status: SHIPPED
Start: 2020-12-10 | End: 2021-04-19

## 2020-12-15 ENCOUNTER — HOSPITAL ENCOUNTER (OUTPATIENT)
Dept: PHYSICAL THERAPY | Age: 50
Setting detail: THERAPIES SERIES
Discharge: HOME | End: 2020-12-15
Payer: COMMERCIAL

## 2020-12-15 NOTE — PROGRESS NOTES
[address] [facility]                Phone: [phone]   Fax: [phone]    To: NICANOR Ferrari       From: Caleb Bullsam      Patient: Kwaku Wolff       : 1970  Diagnosis:       Date: 12/15/2020  Treatment Diagnosis:  lumbar radiculopathy    Physical Therapy Discharge Note    Total Visits to date:   3 Cancels/No-shows to date:  1 cancellation     Plan of Care/Treatment to date:  [x] Therapeutic Exercise    [x] Modalities:  [x] Therapeutic Activity     [] Ultrasound  [x] Electrical Stimulation  [] Gait Training      [] Cervical Traction   [] Lumbar Traction  [] Neuromuscular Re-education  [x] Cold/hotpack [] Iontophoresis  [x] Instruction in HEP      Other:  [] Manual Therapy       []    [] Aquatic Therapy       []                    ? Progress towards goals:  pt did not return for treatment after 20 so formal reassessment not possible    Goal Status:  [] Achieved [x] Partially Achieved  [] Not Achieved     Patient Status: [] Continue per initial plan of Care     [x] Patient now discharged     [] Additional visits requested, Please re-certify for additional visits:      Requested frequency/duration:  X/week for weeks    Electronically signed by: BELEM Westbrook     If you have any questions or concerns, please don't hesitate to call.   Thank you for your referral.

## 2021-01-15 ENCOUNTER — TELEPHONE (OUTPATIENT)
Dept: ADMINISTRATIVE | Age: 51
End: 2021-01-15

## 2021-01-27 ENCOUNTER — OFFICE VISIT (OUTPATIENT)
Dept: PHYSICAL MEDICINE AND REHAB | Age: 51
End: 2021-01-27
Payer: COMMERCIAL

## 2021-01-27 VITALS
DIASTOLIC BLOOD PRESSURE: 100 MMHG | SYSTOLIC BLOOD PRESSURE: 164 MMHG | HEIGHT: 68 IN | HEART RATE: 80 BPM | BODY MASS INDEX: 24.71 KG/M2 | WEIGHT: 163 LBS

## 2021-01-27 DIAGNOSIS — M51.26 LUMBAR DISC HERNIATION: ICD-10-CM

## 2021-01-27 DIAGNOSIS — M54.17 LUMBOSACRAL RADICULITIS: Primary | ICD-10-CM

## 2021-01-27 PROCEDURE — G8484 FLU IMMUNIZE NO ADMIN: HCPCS | Performed by: PHYSICAL MEDICINE & REHABILITATION

## 2021-01-27 PROCEDURE — 1036F TOBACCO NON-USER: CPT | Performed by: PHYSICAL MEDICINE & REHABILITATION

## 2021-01-27 PROCEDURE — 99213 OFFICE O/P EST LOW 20 MIN: CPT | Performed by: PHYSICAL MEDICINE & REHABILITATION

## 2021-01-27 PROCEDURE — G8427 DOCREV CUR MEDS BY ELIG CLIN: HCPCS | Performed by: PHYSICAL MEDICINE & REHABILITATION

## 2021-01-27 PROCEDURE — 3017F COLORECTAL CA SCREEN DOC REV: CPT | Performed by: PHYSICAL MEDICINE & REHABILITATION

## 2021-01-27 PROCEDURE — G8420 CALC BMI NORM PARAMETERS: HCPCS | Performed by: PHYSICAL MEDICINE & REHABILITATION

## 2021-01-27 NOTE — H&P
Anum Bossats, [address] [facility] Physical Medicine and Rehabilitation  [address] [facility] [place]  Phone: [phone]  Fax: [phone]    PCP: Easton De Los Santos MD  Date of visit: 1/27/21    Chief Complaint   Patient presents with    Back Pain     follow up     Interval:   Patient presents today regarding low back and leg pain. He tried to do PT and couldn't complete due to pain. He only finished 3 visits and was recommended to follow up here due to pain. He reports continued low back and now bilateral leg pain. The pain is rated Pain Score:   9, is described as shooting, burning, and is located in the low back with radiation to the legs- L5 distribution. The pain is better with nothing. The pain is worse with everything. There is associated numbness/tingling in both feet. There is weakness. There is no bowel/bladder changes. The prior workup has included: Xray, EMG.      The prior treatment has included:  PT: 3 visits    Chiropractic: none    Modalities: none   OTC Tylenol: none   NSAIDS: ibuprofen with no relief   Opioids: none    Membrane stabilizers: neurontin 600mg TID with no relief   Muscle relaxers: none    Previous injections: none    Previous surgery at this site: none    Allergies   Allergen Reactions    Medrol [Methylprednisolone] Diarrhea and Other (See Comments)     Severe GI upset, bloody stool, dry skin, shakiness       Current Outpatient Medications   Medication Sig Dispense Refill    lisinopril (PRINIVIL;ZESTRIL) 10 MG tablet take 1 tablet by mouth once daily 30 tablet 2    gabapentin (NEURONTIN) 600 MG tablet take 1 tablet by mouth three times a day 90 tablet 2    lidocaine (LIDODERM) 5 % apply 1 patch daily for 12 hours to off for 12 hours      omeprazole (PRILOSEC) 40 MG delayed release capsule Take 1 capsule by mouth daily (with breakfast) 90 capsule 0    acetaminophen (TYLENOL) 500 MG tablet Take 1 tablet by mouth 4 times daily as needed for Pain (Patient not taking: Reported on 1/27/2021) 360 tablet 1    ibuprofen (ADVIL;MOTRIN) 800 MG tablet Take 1 tablet by mouth 4 times daily as needed for Pain (Patient not taking: Reported on 1/27/2021) 360 tablet 1     No current facility-administered medications for this visit. Past Medical History:   Diagnosis Date    Essential hypertension 4/6/2020    Lumbar radiculopathy 4/6/2020       History reviewed. No pertinent surgical history. Family History   Problem Relation Age of Onset    Cancer Mother     Asthma Mother     Hypertension Father        Social History     Tobacco Use    Smoking status: Never Smoker    Smokeless tobacco: Never Used   Substance Use Topics    Alcohol use: Yes     Alcohol/week: 3.0 standard drinks     Types: 3 Cans of beer per week     Comment: 3 a week     Drug use: No          Functional Status: The patient is able to ambulate and perform activities of daily living without the use of an assistive device. ROS:    Constitutional: Denies fevers, chills, night sweats, unintentional weight loss     Skin: Denies rash or skin changes     Eyes: Denies vision changes    Ears/Nose/Throat: Denies nasal congestion or sore throat     Respiratory: Denies SOB or cough     Cardiovascular: Denies CP, palpitations, edema      Gastrointestinal: Denies abdominal pain,  N/V, constipation, or diarrhea    Genitourinary: Denies urinary symptoms    Neurologic: See HPI.     MSK: See HPI. Psychiatric: +sleep disturbance, denies anxiety, depression    Hematologic/Lymphatic/Immunologic: Denies bruising       Physical Exam:   Blood pressure (!) 164/100, pulse 80, height 5' 8\" (1.727 m), weight 163 lb (73.9 kg). General: well developed and well nourished in no acute distress. Body habitus is thin  HEENT: No rhinorrhea, sneezing, yawning, or lacrimation. No scleral icterus or conjunctival injection. Resp: symmetrical chest expansion, unlabored breathing, respirations unlabored. CV: Heart rate is regular. Peripheral pulses are palpable  Lymph: No visible regional lymphadenopathy. Skin: No rashes or ecchymosis. Normal turgor. Psych: Mood is calm. Affect is normal.   Ext: No edema noted     MSK:   Back/Hip Exam:   Inspection: Pelvis was symmetric. Lumbar lordotic curvature was decreased. There was no scoliosis. No ecchymoses or erythema. Palpation: Palpatory exam revealed tenderness along lumbosacral paraspinals, midline spine, no ttp SI joint sulcus, greater trochanters and TFL on both side. There was paraspinal spasms. There were no trigger points. ROM: ROM decreased  Special/provocative testing:   Supine SLR positive on right    Neurological Exam:  Strength:   R  L  Hip Flex  5  5  Knee Ext  5  5  Ankle dorsi  5  5  EHL   5 5  Ankle Plantar  5  5    Sensory:  Decreased sensation LT bilateral L5 dermatome     Reflexes:   R  L  Patellar  (2+) (2+)  Ankle Jerk  (0) (1+)    Gait is Antalgic. Imaging: (personally reviewed by me 01/27/21)  EMG   X-ray   MRI L spine     Impression:   Shari Pereyra is a 48 y.o. male     1. Lumbosacral radiculitis    2. Lumbar disc herniation        Plan:   · Patient would benefit from bilateral L5-S1 TFESI. Procedure risks, benefits and alternatives were discussed. Patient would like to proceed.  The patient was educated about the diagnosis, prognosis, indications, risks and benefits of treatment. An opportunity to ask questions was given to the patient and questions were answered. The patient agreed to proceed with the recommended treatment as described above.  Follow up after injection         Juancho Geronimo DO, FAAPMR   Board Certified Physical Medicine and Rehabilitation    The patient was counseled at length about the risks of lila Covid-19 during their perioperative period and any recovery window from their procedure.   The patient was made aware that lila Covid-19  may worsen their prognosis for recovering from their procedure  and lend to a higher morbidity and/or mortality risk. All material risks, benefits, and reasonable alternatives including postponing the procedure were discussed. The patient does wish to proceed with the procedure at this time.

## 2021-01-27 NOTE — PROGRESS NOTES
Jass Dose, [address] [facility] Physical Medicine and Rehabilitation  [address] [facility] [place]  Phone: [phone]  Fax: [phone]    PCP: Bib aCrter MD  Date of visit: 1/27/21    Chief Complaint   Patient presents with    Back Pain     follow up     Interval:   Patient presents today regarding low back and leg pain. He tried to do PT and couldn't complete due to pain. He only finished 3 visits and was recommended to follow up here due to pain. He reports continued low back and now bilateral leg pain. The pain is rated Pain Score:   9, is described as shooting, burning, and is located in the low back with radiation to the legs- L5 distribution. The pain is better with nothing. The pain is worse with everything. There is associated numbness/tingling in both feet. There is weakness. There is no bowel/bladder changes. The prior workup has included: Xray, EMG.      The prior treatment has included:  PT: 3 visits    Chiropractic: none    Modalities: none   OTC Tylenol: none   NSAIDS: ibuprofen with no relief   Opioids: none    Membrane stabilizers: neurontin 600mg TID with no relief   Muscle relaxers: none    Previous injections: none    Previous surgery at this site: none    Allergies   Allergen Reactions    Medrol [Methylprednisolone] Diarrhea and Other (See Comments)     Severe GI upset, bloody stool, dry skin, shakiness       Current Outpatient Medications   Medication Sig Dispense Refill    lisinopril (PRINIVIL;ZESTRIL) 10 MG tablet take 1 tablet by mouth once daily 30 tablet 2    gabapentin (NEURONTIN) 600 MG tablet take 1 tablet by mouth three times a day 90 tablet 2    lidocaine (LIDODERM) 5 % apply 1 patch daily for 12 hours to off for 12 hours      omeprazole (PRILOSEC) 40 MG delayed release capsule Take 1 capsule by mouth daily (with breakfast) 90 capsule 0    acetaminophen (TYLENOL) 500 MG tablet Take 1 tablet by mouth 4 times daily as needed for Pain (Patient not taking: Reported on 1/27/2021) 360 tablet 1    ibuprofen (ADVIL;MOTRIN) 800 MG tablet Take 1 tablet by mouth 4 times daily as needed for Pain (Patient not taking: Reported on 1/27/2021) 360 tablet 1     No current facility-administered medications for this visit. Past Medical History:   Diagnosis Date    Essential hypertension 4/6/2020    Lumbar radiculopathy 4/6/2020       History reviewed. No pertinent surgical history. Family History   Problem Relation Age of Onset    Cancer Mother     Asthma Mother     Hypertension Father        Social History     Tobacco Use    Smoking status: Never Smoker    Smokeless tobacco: Never Used   Substance Use Topics    Alcohol use: Yes     Alcohol/week: 3.0 standard drinks     Types: 3 Cans of beer per week     Comment: 3 a week     Drug use: No          Functional Status: The patient is able to ambulate and perform activities of daily living without the use of an assistive device. ROS:    Constitutional: Denies fevers, chills, night sweats, unintentional weight loss     Skin: Denies rash or skin changes     Eyes: Denies vision changes    Ears/Nose/Throat: Denies nasal congestion or sore throat     Respiratory: Denies SOB or cough     Cardiovascular: Denies CP, palpitations, edema      Gastrointestinal: Denies abdominal pain,  N/V, constipation, or diarrhea    Genitourinary: Denies urinary symptoms    Neurologic: See HPI.     MSK: See HPI. Psychiatric: +sleep disturbance, denies anxiety, depression    Hematologic/Lymphatic/Immunologic: Denies bruising       Physical Exam:   Blood pressure (!) 164/100, pulse 80, height 5' 8\" (1.727 m), weight 163 lb (73.9 kg). General: well developed and well nourished in no acute distress. Body habitus is thin  HEENT: No rhinorrhea, sneezing, yawning, or lacrimation. No scleral icterus or conjunctival injection. Resp: symmetrical chest expansion, unlabored breathing, respirations unlabored. CV: Heart rate is regular. Peripheral pulses are palpable  Lymph: No visible regional lymphadenopathy. Skin: No rashes or ecchymosis. Normal turgor. Psych: Mood is calm. Affect is normal.   Ext: No edema noted     MSK:   Back/Hip Exam:   Inspection: Pelvis was symmetric. Lumbar lordotic curvature was decreased. There was no scoliosis. No ecchymoses or erythema. Palpation: Palpatory exam revealed tenderness along lumbosacral paraspinals, midline spine, no ttp SI joint sulcus, greater trochanters and TFL on both side. There was paraspinal spasms. There were no trigger points. ROM: ROM decreased  Special/provocative testing:   Supine SLR positive on right    Neurological Exam:  Strength:   R  L  Hip Flex  5  5  Knee Ext  5  5  Ankle dorsi  5  5  EHL   5 5  Ankle Plantar  5  5    Sensory:  Decreased sensation LT bilateral L5 dermatome     Reflexes:   R  L  Patellar  (2+) (2+)  Ankle Jerk  (0) (1+)    Gait is Antalgic. Imaging: (personally reviewed by me 01/27/21)  EMG   X-ray   MRI L spine     Impression:   Erika Romeo is a 48 y.o. male     1. Lumbosacral radiculitis    2. Lumbar disc herniation        Plan:   · Patient would benefit from bilateral L5-S1 TFESI. Procedure risks, benefits and alternatives were discussed. Patient would like to proceed.  The patient was educated about the diagnosis, prognosis, indications, risks and benefits of treatment. An opportunity to ask questions was given to the patient and questions were answered. The patient agreed to proceed with the recommended treatment as described above.  Follow up after injection         Alexa William DO, FAAPMR   Board Certified Physical Medicine and Rehabilitation    The patient was counseled at length about the risks of lila Covid-19 during their perioperative period and any recovery window from their procedure.   The patient was made aware that lila Covid-19  may worsen their prognosis for

## 2021-02-03 ENCOUNTER — TELEPHONE (OUTPATIENT)
Dept: PHYSICAL MEDICINE AND REHAB | Age: 51
End: 2021-02-03

## 2021-02-03 NOTE — TELEPHONE ENCOUNTER
Attempted to call patient to schedule for epidural steroid injection with number that was listed on his demographics but number not working, called next contact that was listed Dolores Castro and number out of service. Called patient daughter Oumou Khan and his daughter stated she will relay message to patient to have him call us to get scheduled for epidural injection.

## 2021-02-12 ENCOUNTER — TELEPHONE (OUTPATIENT)
Dept: PHYSICAL MEDICINE AND REHAB | Age: 51
End: 2021-02-12

## 2021-02-12 NOTE — TELEPHONE ENCOUNTER
Called patient and scheduled him for epidural injection on feb 25 2021. Instructed him on holding ibuprofen for 24 hours prior to exam, verbalizes understanding.

## 2021-02-19 ENCOUNTER — HOSPITAL ENCOUNTER (EMERGENCY)
Age: 51
Discharge: HOME | End: 2021-02-19
Attending: EMERGENCY MEDICINE
Payer: COMMERCIAL

## 2021-02-19 ENCOUNTER — HOSPITAL ENCOUNTER (OUTPATIENT)
Age: 51
Discharge: HOME | End: 2021-02-21
Payer: COMMERCIAL

## 2021-02-19 VITALS
SYSTOLIC BLOOD PRESSURE: 111 MMHG | RESPIRATION RATE: 16 BRPM | DIASTOLIC BLOOD PRESSURE: 73 MMHG | HEART RATE: 88 BPM | TEMPERATURE: 97.8 F | OXYGEN SATURATION: 96 %

## 2021-02-19 VITALS
DIASTOLIC BLOOD PRESSURE: 78 MMHG | HEART RATE: 99 BPM | WEIGHT: 178 LBS | HEIGHT: 68 IN | OXYGEN SATURATION: 98 % | RESPIRATION RATE: 16 BRPM | SYSTOLIC BLOOD PRESSURE: 132 MMHG | BODY MASS INDEX: 26.98 KG/M2 | TEMPERATURE: 97.9 F

## 2021-02-19 DIAGNOSIS — G89.18 POST-OP PAIN: Primary | ICD-10-CM

## 2021-02-19 DIAGNOSIS — M54.17 LUMBOSACRAL RADICULITIS: ICD-10-CM

## 2021-02-19 DIAGNOSIS — K62.89 RECTAL MASS: ICD-10-CM

## 2021-02-19 DIAGNOSIS — K64.9 BLEEDING HEMORRHOID: Primary | ICD-10-CM

## 2021-02-19 LAB
ANION GAP SERPL CALCULATED.3IONS-SCNC: 9 MMOL/L (ref 7–16)
BASOPHILS ABSOLUTE: 0.06 E9/L (ref 0–0.2)
BASOPHILS RELATIVE PERCENT: 1.2 % (ref 0–2)
BUN BLDV-MCNC: 14 MG/DL (ref 6–20)
CALCIUM SERPL-MCNC: 9 MG/DL (ref 8.6–10.2)
CHLORIDE BLD-SCNC: 106 MMOL/L (ref 98–107)
CO2: 28 MMOL/L (ref 22–29)
CREAT SERPL-MCNC: 1.1 MG/DL (ref 0.7–1.2)
EOSINOPHILS ABSOLUTE: 0.04 E9/L (ref 0.05–0.5)
EOSINOPHILS RELATIVE PERCENT: 0.8 % (ref 0–6)
GFR AFRICAN AMERICAN: >60
GFR NON-AFRICAN AMERICAN: >60 ML/MIN/1.73
GLUCOSE BLD-MCNC: 113 MG/DL (ref 74–99)
HCT VFR BLD CALC: 42.7 % (ref 37–54)
HEMOGLOBIN: 13.6 G/DL (ref 12.5–16.5)
IMMATURE GRANULOCYTES #: 0.12 E9/L
IMMATURE GRANULOCYTES %: 2.5 % (ref 0–5)
LYMPHOCYTES ABSOLUTE: 1.45 E9/L (ref 1.5–4)
LYMPHOCYTES RELATIVE PERCENT: 30.1 % (ref 20–42)
MCH RBC QN AUTO: 29.6 PG (ref 26–35)
MCHC RBC AUTO-ENTMCNC: 31.9 % (ref 32–34.5)
MCV RBC AUTO: 93 FL (ref 80–99.9)
MONOCYTES ABSOLUTE: 0.71 E9/L (ref 0.1–0.95)
MONOCYTES RELATIVE PERCENT: 14.7 % (ref 2–12)
NEUTROPHILS ABSOLUTE: 2.44 E9/L (ref 1.8–7.3)
NEUTROPHILS RELATIVE PERCENT: 50.7 % (ref 43–80)
PDW BLD-RTO: 15.6 FL (ref 11.5–15)
PLATELET # BLD: 287 E9/L (ref 130–450)
PMV BLD AUTO: 10.8 FL (ref 7–12)
POTASSIUM REFLEX MAGNESIUM: 3.9 MMOL/L (ref 3.5–5)
RBC # BLD: 4.59 E12/L (ref 3.8–5.8)
SODIUM BLD-SCNC: 143 MMOL/L (ref 132–146)
WBC # BLD: 4.8 E9/L (ref 4.5–11.5)

## 2021-02-19 PROCEDURE — 80048 BASIC METABOLIC PNL TOTAL CA: CPT

## 2021-02-19 PROCEDURE — 99282 EMERGENCY DEPT VISIT SF MDM: CPT

## 2021-02-19 PROCEDURE — U0003 INFECTIOUS AGENT DETECTION BY NUCLEIC ACID (DNA OR RNA); SEVERE ACUTE RESPIRATORY SYNDROME CORONAVIRUS 2 (SARS-COV-2) (CORONAVIRUS DISEASE [COVID-19]), AMPLIFIED PROBE TECHNIQUE, MAKING USE OF HIGH THROUGHPUT TECHNOLOGIES AS DESCRIBED BY CMS-2020-01-R: HCPCS

## 2021-02-19 PROCEDURE — 99283 EMERGENCY DEPT VISIT LOW MDM: CPT

## 2021-02-19 PROCEDURE — 6370000000 HC RX 637 (ALT 250 FOR IP): Performed by: STUDENT IN AN ORGANIZED HEALTH CARE EDUCATION/TRAINING PROGRAM

## 2021-02-19 PROCEDURE — 85025 COMPLETE CBC W/AUTO DIFF WBC: CPT

## 2021-02-19 PROCEDURE — 46083 INC THROMBOSED HROID XTRNL: CPT

## 2021-02-19 RX ORDER — DIBUCAINE 0.28 G/28G
OINTMENT TOPICAL 3 TIMES DAILY
Status: DISCONTINUED | OUTPATIENT
Start: 2021-02-19 | End: 2021-02-19

## 2021-02-19 RX ORDER — AMOXICILLIN 250 MG
2 CAPSULE ORAL DAILY PRN
Qty: 30 TABLET | Refills: 0 | Status: SHIPPED | OUTPATIENT
Start: 2021-02-19 | End: 2021-07-22

## 2021-02-19 RX ORDER — HYDROCODONE BITARTRATE AND ACETAMINOPHEN 5; 325 MG/1; MG/1
1 TABLET ORAL ONCE
Status: DISCONTINUED | OUTPATIENT
Start: 2021-02-19 | End: 2021-02-19 | Stop reason: HOSPADM

## 2021-02-19 RX ORDER — IBUPROFEN 800 MG/1
800 TABLET ORAL ONCE
Status: COMPLETED | OUTPATIENT
Start: 2021-02-19 | End: 2021-02-19

## 2021-02-19 RX ORDER — HYDROCORTISONE 25 MG/G
CREAM TOPICAL 2 TIMES DAILY
Status: DISCONTINUED | OUTPATIENT
Start: 2021-02-19 | End: 2021-02-19 | Stop reason: HOSPADM

## 2021-02-19 RX ORDER — FENTANYL CITRATE 50 UG/ML
25 INJECTION, SOLUTION INTRAMUSCULAR; INTRAVENOUS ONCE
Status: DISCONTINUED | OUTPATIENT
Start: 2021-02-19 | End: 2021-02-19

## 2021-02-19 RX ORDER — OXYCODONE HYDROCHLORIDE 5 MG/1
5 TABLET ORAL EVERY 4 HOURS PRN
Qty: 12 TABLET | Refills: 0 | Status: SHIPPED | OUTPATIENT
Start: 2021-02-19 | End: 2021-02-22

## 2021-02-19 RX ORDER — LIDOCAINE HYDROCHLORIDE 10 MG/ML
5 INJECTION, SOLUTION INFILTRATION; PERINEURAL ONCE
Status: DISCONTINUED | OUTPATIENT
Start: 2021-02-19 | End: 2021-02-19 | Stop reason: HOSPADM

## 2021-02-19 RX ORDER — POLYETHYLENE GLYCOL 3350 17 G/17G
17 POWDER, FOR SOLUTION ORAL DAILY
Qty: 1530 G | Refills: 1 | Status: SHIPPED | OUTPATIENT
Start: 2021-02-19 | End: 2021-03-21

## 2021-02-19 RX ADMIN — IBUPROFEN 800 MG: 800 TABLET, FILM COATED ORAL at 08:57

## 2021-02-19 ASSESSMENT — ENCOUNTER SYMPTOMS
ANAL BLEEDING: 0
CHEST TIGHTNESS: 0
CONSTIPATION: 0
RECTAL PAIN: 1
COUGH: 0
EYE DISCHARGE: 0
CONSTIPATION: 0
BACK PAIN: 0
DIARRHEA: 0
BLOOD IN STOOL: 0
SINUS PAIN: 0
ABDOMINAL DISTENTION: 0
VOMITING: 0
ABDOMINAL PAIN: 0
SHORTNESS OF BREATH: 0
RHINORRHEA: 0
ABDOMINAL PAIN: 0
NAUSEA: 0
SINUS PRESSURE: 0

## 2021-02-19 ASSESSMENT — PAIN SCALES - GENERAL: PAINLEVEL_OUTOF10: 8

## 2021-02-19 ASSESSMENT — PAIN DESCRIPTION - PAIN TYPE: TYPE: ACUTE PAIN

## 2021-02-19 ASSESSMENT — PAIN DESCRIPTION - LOCATION: LOCATION: BUTTOCKS

## 2021-02-19 NOTE — ED PROVIDER NOTES
[address] [facility] ENCOUNTER        History of Present Illness:  2/19/21, Time: 8:03 AM EST  Chief Complaint   Patient presents with    Abscess     right buttocks x1 week        Denver Gaskins is a 48 y.o. male presenting to the ED for mass of R buttocks noticed first about a week ago. Patient reports that it is painful to sit certain ways, and hurts if something touches it. He thinks it has grown in size. He denies fever, chills, and any recent blood in his stool. He reports he has had hematochezia in the past. He denies constipation or straining with defecation. Review of Systems:   Review of Systems   Constitutional: Negative for chills and fever. Gastrointestinal: Negative for abdominal pain, blood in stool and constipation. Skin:        Reports R buttocks mass       --------------------------------------------- PAST HISTORY ---------------------------------------------  Past Medical History:  has a past medical history of Essential hypertension and Lumbar radiculopathy. Past Surgical History:  has no past surgical history on file. Social History:  reports that he has never smoked. He has never used smokeless tobacco. He reports current alcohol use of about 3.0 standard drinks of alcohol per week. He reports that he does not use drugs. Family History: family history includes Asthma in his mother; Cancer in his mother; Hypertension in his father. . Unless otherwise noted, family history is non contributory    The patients home medications have been reviewed. Allergies: Medrol [methylprednisolone]    ---------------------------------------------------PHYSICAL EXAM--------------------------------------  Physical Exam  Constitutional:       General: He is not in acute distress. Appearance: Normal appearance. HENT:      Head: Normocephalic and atraumatic. Eyes:      General: No scleral icterus.      Extraocular Movements: Extraocular movements intact. Cardiovascular:      Rate and Rhythm: Normal rate and regular rhythm. Heart sounds: No murmur. No gallop. Pulmonary:      Effort: No respiratory distress. Breath sounds: No stridor. No wheezing or rhonchi. Abdominal:      General: There is no distension. Palpations: Abdomen is soft. Tenderness: There is no abdominal tenderness. There is no guarding. Genitourinary:     Rectum: Guaiac result negative. Comments: Large external hemorrhoids, somewhat firm, do not appear thrombosed. No internal hemorrhoids appreciated on rectal exam.  Musculoskeletal: Normal range of motion. Neurological:      General: No focal deficit present. Mental Status: He is alert and oriented to person, place, and time. Psychiatric:         Mood and Affect: Mood normal.         Behavior: Behavior normal.         Thought Content: Thought content normal.       ------------------------------------------------- RESULTS -------------------------------------------------  I have personally reviewed all laboratory and imaging results for this patient. Results are listed below.      LABS: (Lab results interpreted by me)  Results for orders placed or performed during the hospital encounter of 02/19/21   CBC Auto Differential   Result Value Ref Range    WBC 4.8 4.5 - 11.5 E9/L    RBC 4.59 3.80 - 5.80 E12/L    Hemoglobin 13.6 12.5 - 16.5 g/dL    Hematocrit 42.7 37.0 - 54.0 %    MCV 93.0 80.0 - 99.9 fL    MCH 29.6 26.0 - 35.0 pg    MCHC 31.9 (L) 32.0 - 34.5 %    RDW 15.6 (H) 11.5 - 15.0 fL    Platelets 897 004 - 419 E9/L    MPV 10.8 7.0 - 12.0 fL    Neutrophils % 50.7 43.0 - 80.0 %    Immature Granulocytes % 2.5 0.0 - 5.0 %    Lymphocytes % 30.1 20.0 - 42.0 %    Monocytes % 14.7 (H) 2.0 - 12.0 %    Eosinophils % 0.8 0.0 - 6.0 %    Basophils % 1.2 0.0 - 2.0 %    Neutrophils Absolute 2.44 1.80 - 7.30 E9/L    Immature Granulocytes # 0.12 E9/L    Lymphocytes Absolute 1.45 (L) 1.50 -

## 2021-02-19 NOTE — PROGRESS NOTES
GENERAL SURGERY  Update note  NOTE  2/19/2021    Subjective:  Patient presented back with rectal bleeding after excision of external hemorrhoid this morning. 1% lidocaine with epinephrine was used as a local anesthetic agent. The external hemorrhoid and rectal vault were thoroughly evaluated. Silver nitrate was used to help prevent further bleeding. Excision was then packed with a 2 x 2. There was no active bleeding at the end of the procedure. Patient tolerated this well and was discharged home. Patient is to still follow-up with Dr. Karlene Jacobsen next week.     Electronically signed by Barrie Levine DO on 2/19/2021 at 5:25 PM

## 2021-02-19 NOTE — CONSULTS
GENERAL SURGERY  CONSULT NOTE  2/19/2021    Physician Consulted: Dr. Robb Bains  Reason for Consult: thrombosis external hemorrhoid   Referring Physician: Dr. Osmin Kimbrough is a 48 y.o. male who presents for evaluation of rectal mass. Pt states that mass and rectal pain started about a week ago, but have been getting worse. He denies any fever, chills or any recent blood/ black bowel movements. Wife evaluated it today and thought it might be an abscess of hemorrhoid and said to come in. On exam pt has a large inferior lateral thrombosis external hemorrhoid. Past Medical History:   Diagnosis Date    Essential hypertension 4/6/2020    Lumbar radiculopathy 4/6/2020       History reviewed. No pertinent surgical history. Medications Prior to Admission:    Prior to Admission medications    Medication Sig Start Date End Date Taking? Authorizing Provider   lisinopril (PRINIVIL;ZESTRIL) 10 MG tablet take 1 tablet by mouth once daily 12/10/20   Hillary Mandujano MD       Allergies   Allergen Reactions    Medrol [Methylprednisolone] Diarrhea and Other (See Comments)     Severe GI upset, bloody stool, dry skin, shakiness       Family History   Problem Relation Age of Onset    Cancer Mother     Asthma Mother     Hypertension Father        Social History     Tobacco Use    Smoking status: Never Smoker    Smokeless tobacco: Never Used   Substance Use Topics    Alcohol use:  Yes     Alcohol/week: 3.0 standard drinks     Types: 3 Cans of beer per week     Comment: 3 a week     Drug use: No         Review of Systems   General ROS: negative  Hematological and Lymphatic ROS: negative  Respiratory ROS: negative  Cardiovascular ROS: negative  Gastrointestinal ROS: increasing rectal pain for one week   Genito-Urinary ROS: negative  Musculoskeletal ROS: negative      PHYSICAL EXAM:    Vitals:    02/19/21 0759   BP: 111/73   Pulse:    Resp:    Temp:    SpO2:        General Appearance:  awake, alert, oriented, in mild acute distress  Skin:  Skin color, texture, turgor normal. No rashes or lesions. Head/face:  NCAT  Eyes:  No gross abnormalities. Lungs:  No chest wall tenderness. Heart:  Heart regular rate and rhythm  Abdomen:  Soft, non-tender, normal bowel sounds. No bruits, organomegaly or masses. Extremities: Extremities warm to touch, pink, with no edema. Male Rectal:  No blood in rectal vault, large thrombosis external hemorrhoid       LABS:    CBC  Recent Labs     02/19/21  0907   WBC 4.8   HGB 13.6   HCT 42.7        BMP  No results for input(s): NA, K, CL, CO2, BUN, CREATININE, CALCIUM in the last 72 hours. Invalid input(s): GLU  Liver Function  No results for input(s): AMYLASE, LIPASE, BILITOT, BILIDIR, AST, ALT, ALKPHOS, PROT, LABALBU in the last 72 hours. No results for input(s): LACTATE in the last 72 hours. No results for input(s): INR, PTT in the last 72 hours. Invalid input(s): PT    RADIOLOGY    No results found.       ASSESSMENT:  48 y.o. male with right inferior thrombosis external hemorrhoid     PLAN:  Pain medication  Excision of thrombosed external hemorrhoid today 2/19  Will need to follow up with Dr. Loida Marsh     Electronically signed by Isabel Shay DO on 2/19/21 at 9:26 AM EST

## 2021-02-19 NOTE — ED PROVIDER NOTES
HPI     Patient is a 48 y.o. Lisset Wilsonf a past medical history of hypertension, hemorrhoids who presents with a chief complaint of bleeding per rectum. This has been occurring for few hours. Patient states that it gets better with nothing. Patient states that it gets worse with nothing. Patient states that it is severe in severity. Patient is presenting with rectal bleeding. Patient had a hemorrhoidectomy earlier today. Patient then went home and had significant bleeding. Patient states that he also has some rectal pain. Patient denies any other symptoms at this time. Patient denies any fevers, chills, nausea, vomiting, chest pain, shortness breath, abdominal pain, change in urination or bowel habits. Review of Systems   Constitutional: Negative for activity change, appetite change, fatigue and fever. HENT: Negative for congestion, rhinorrhea, sinus pressure and sinus pain. Eyes: Negative for discharge. Respiratory: Negative for cough, chest tightness and shortness of breath. Cardiovascular: Negative for chest pain and palpitations. Gastrointestinal: Positive for rectal pain. Negative for abdominal distention, abdominal pain, anal bleeding, constipation, diarrhea, nausea and vomiting. Endocrine: Negative for polydipsia and polyuria. Genitourinary: Negative for decreased urine volume, difficulty urinating, enuresis, flank pain, frequency and urgency. Musculoskeletal: Negative for arthralgias, back pain and neck stiffness. Skin: Negative for rash and wound. Neurological: Negative for dizziness, weakness, light-headedness and headaches. Psychiatric/Behavioral: Negative for agitation, behavioral problems and confusion. Physical Exam  Vitals signs and nursing note reviewed. Constitutional:       Appearance: Normal appearance. He is well-developed. HENT:      Head: Normocephalic and atraumatic. Nose: Nose normal. No congestion or rhinorrhea.    Eyes: Conjunctiva/sclera: Conjunctivae normal.   Neck:      Musculoskeletal: Normal range of motion and neck supple. Cardiovascular:      Rate and Rhythm: Regular rhythm. Tachycardia present. Heart sounds: Normal heart sounds. No murmur. Pulmonary:      Effort: Pulmonary effort is normal.      Breath sounds: Normal breath sounds. No rales. Abdominal:      General: Abdomen is flat. Palpations: Abdomen is soft. Tenderness: There is no abdominal tenderness. There is no guarding or rebound. Genitourinary:     Comments: Patient has a large hemorrhoid that is actively bleeding. Musculoskeletal:         General: No swelling, tenderness or deformity. Skin:     General: Skin is warm and dry. Neurological:      Mental Status: He is alert and oriented to person, place, and time. Cranial Nerves: No cranial nerve deficit. Coordination: Coordination normal.   Psychiatric:         Mood and Affect: Mood normal.         Behavior: Behavior normal.          Procedures     MDM         Patient is a 48 y.o. male presenting with Bleeding hemorrhoid. Patient had surgery earlier this morning. General surgery was consulted. General surgery came down and saw patient. Patient had a significant bleeding hemorrhoid. Surgery used silver nitrate and stop the bleeding. Patient can follow-up as an outpatient. Patient is agreeable to this plan. Patient was given return precautions. Patient will follow up with their primary care provider. Patient is agreeable to this plan. Patient has remained stable throughout their stay in the ED. Patient was seen and evaluated by myself and my attending Cindy Alvarez MD. Assessment and Plan discussed with attending provider, please see attestation for final plan of care. This note was done using dictation software and there may be some grammatical errors associated with this.     Maciej Snow MD            --------------------------------------------- PAST HISTORY ---------------------------------------------  Past Medical History:  has a past medical history of Essential hypertension and Lumbar radiculopathy. Past Surgical History:  has no past surgical history on file. Social History:  reports that he has never smoked. He has never used smokeless tobacco. He reports current alcohol use of about 3.0 standard drinks of alcohol per week. He reports that he does not use drugs. Family History: family history includes Asthma in his mother; Cancer in his mother; Hypertension in his father. The patients home medications have been reviewed. Allergies: Medrol [methylprednisolone]    -------------------------------------------------- RESULTS -------------------------------------------------  Labs:  No results found for this visit on 02/19/21. Radiology:  No orders to display       ------------------------- NURSING NOTES AND VITALS REVIEWED ---------------------------  Date / Time Roomed:  2/19/2021  4:37 PM  ED Bed Assignment:  08/08    The nursing notes within the ED encounter and vital signs as below have been reviewed. /78   Pulse 99   Temp 97.9 °F (36.6 °C)   Resp 16   Ht 5' 8\" (1.727 m)   Wt 178 lb (80.7 kg)   SpO2 98%   BMI 27.06 kg/m²   Oxygen Saturation Interpretation: Normal      ------------------------------------------ PROGRESS NOTES ------------------------------------------  9:14 PM EST  I have spoken with the patient and discussed todays results, in addition to providing specific details for the plan of care and counseling regarding the diagnosis and prognosis. Their questions are answered at this time and they are agreeable with the plan. I discussed at length with them reasons for immediate return here for re evaluation.  They will followup with their primary care physician by calling their office on Monday.      --------------------------------- ADDITIONAL PROVIDER NOTES ---------------------------------  At this time the patient is without objective evidence of an acute process requiring hospitalization or inpatient management. They have remained hemodynamically stable throughout their entire ED visit and are stable for discharge with outpatient follow-up. The plan has been discussed in detail and they are aware of the specific conditions for emergent return, as well as the importance of follow-up. Discharge Medication List as of 2/19/2021  5:28 PM          Diagnosis:  1. Bleeding hemorrhoid        Disposition:  Patient's disposition: Discharge to home  Patient's condition is stable.          Twan Duarte MD  Resident  02/19/21 9610

## 2021-02-19 NOTE — ED NOTES
Pt alert and oriented x4. Speech clear. Respirations easy/unlabored. Skin warm/dry. Appropriate color. No signs of acute distress noted. Pt/family teaching provided; verbalized understanding. Pt stable for discharge.        Gela Jay RN  02/19/21 1038

## 2021-02-21 LAB
SARS-COV-2: NOT DETECTED
SOURCE: NORMAL

## 2021-02-24 ENCOUNTER — TELEPHONE (OUTPATIENT)
Dept: PHYSICAL MEDICINE AND REHAB | Age: 51
End: 2021-02-24

## 2021-03-01 ENCOUNTER — TELEPHONE (OUTPATIENT)
Dept: PHYSICAL MEDICINE AND REHAB | Age: 51
End: 2021-03-01

## 2021-03-01 NOTE — TELEPHONE ENCOUNTER
Called that number and it went to voicemail. So I left a message with callback number and instructed the patient to call the office to reschedule.

## 2021-03-01 NOTE — TELEPHONE ENCOUNTER
Pt called in to r/s his proceudre for [place] twila. Arslan Weber can be reached at [phone]. Thank you.

## 2021-04-06 ENCOUNTER — PREP FOR PROCEDURE (OUTPATIENT)
Dept: PHYSICAL MEDICINE AND REHAB | Age: 51
End: 2021-04-06

## 2021-07-22 ENCOUNTER — OFFICE VISIT (OUTPATIENT)
Dept: FAMILY MEDICINE CLINIC | Age: 51
End: 2021-07-22
Payer: COMMERCIAL

## 2021-07-22 VITALS
HEIGHT: 68 IN | TEMPERATURE: 98.6 F | OXYGEN SATURATION: 97 % | HEART RATE: 108 BPM | DIASTOLIC BLOOD PRESSURE: 105 MMHG | BODY MASS INDEX: 23.49 KG/M2 | SYSTOLIC BLOOD PRESSURE: 169 MMHG | WEIGHT: 155 LBS

## 2021-07-22 DIAGNOSIS — F32.9 CURRENT EPISODE OF MAJOR DEPRESSIVE DISORDER WITHOUT PRIOR EPISODE, UNSPECIFIED DEPRESSION EPISODE SEVERITY: ICD-10-CM

## 2021-07-22 DIAGNOSIS — I10 ESSENTIAL HYPERTENSION: ICD-10-CM

## 2021-07-22 DIAGNOSIS — M54.50 CHRONIC LOW BACK PAIN, UNSPECIFIED BACK PAIN LATERALITY, UNSPECIFIED WHETHER SCIATICA PRESENT: ICD-10-CM

## 2021-07-22 DIAGNOSIS — M79.604 RIGHT LEG PAIN: Primary | ICD-10-CM

## 2021-07-22 DIAGNOSIS — Z78.9 ALCOHOL USE: ICD-10-CM

## 2021-07-22 DIAGNOSIS — G89.29 CHRONIC LOW BACK PAIN, UNSPECIFIED BACK PAIN LATERALITY, UNSPECIFIED WHETHER SCIATICA PRESENT: ICD-10-CM

## 2021-07-22 LAB — HBA1C MFR BLD: 5.4 %

## 2021-07-22 PROCEDURE — 83036 HEMOGLOBIN GLYCOSYLATED A1C: CPT | Performed by: STUDENT IN AN ORGANIZED HEALTH CARE EDUCATION/TRAINING PROGRAM

## 2021-07-22 PROCEDURE — 99212 OFFICE O/P EST SF 10 MIN: CPT | Performed by: STUDENT IN AN ORGANIZED HEALTH CARE EDUCATION/TRAINING PROGRAM

## 2021-07-22 PROCEDURE — 3017F COLORECTAL CA SCREEN DOC REV: CPT | Performed by: FAMILY MEDICINE

## 2021-07-22 PROCEDURE — G8427 DOCREV CUR MEDS BY ELIG CLIN: HCPCS | Performed by: FAMILY MEDICINE

## 2021-07-22 PROCEDURE — 99213 OFFICE O/P EST LOW 20 MIN: CPT | Performed by: STUDENT IN AN ORGANIZED HEALTH CARE EDUCATION/TRAINING PROGRAM

## 2021-07-22 PROCEDURE — 1036F TOBACCO NON-USER: CPT | Performed by: FAMILY MEDICINE

## 2021-07-22 PROCEDURE — G8420 CALC BMI NORM PARAMETERS: HCPCS | Performed by: FAMILY MEDICINE

## 2021-07-22 RX ORDER — LISINOPRIL 10 MG/1
TABLET ORAL
Qty: 30 TABLET | Refills: 0 | Status: SHIPPED
Start: 2021-07-22 | End: 2021-08-27 | Stop reason: SDUPTHER

## 2021-07-22 RX ORDER — AMITRIPTYLINE HYDROCHLORIDE 10 MG/1
10 TABLET, FILM COATED ORAL NIGHTLY
Qty: 30 TABLET | Refills: 3 | Status: SHIPPED
Start: 2021-07-22 | End: 2021-08-27 | Stop reason: SDUPTHER

## 2021-07-22 SDOH — ECONOMIC STABILITY: FOOD INSECURITY: WITHIN THE PAST 12 MONTHS, THE FOOD YOU BOUGHT JUST DIDN'T LAST AND YOU DIDN'T HAVE MONEY TO GET MORE.: NEVER TRUE

## 2021-07-22 SDOH — ECONOMIC STABILITY: FOOD INSECURITY: WITHIN THE PAST 12 MONTHS, YOU WORRIED THAT YOUR FOOD WOULD RUN OUT BEFORE YOU GOT MONEY TO BUY MORE.: NEVER TRUE

## 2021-07-22 ASSESSMENT — PATIENT HEALTH QUESTIONNAIRE - PHQ9
7. TROUBLE CONCENTRATING ON THINGS, SUCH AS READING THE NEWSPAPER OR WATCHING TELEVISION: 3
SUM OF ALL RESPONSES TO PHQ QUESTIONS 1-9: 15
8. MOVING OR SPEAKING SO SLOWLY THAT OTHER PEOPLE COULD HAVE NOTICED. OR THE OPPOSITE, BEING SO FIGETY OR RESTLESS THAT YOU HAVE BEEN MOVING AROUND A LOT MORE THAN USUAL: 0
2. FEELING DOWN, DEPRESSED OR HOPELESS: 3
5. POOR APPETITE OR OVEREATING: 3
1. LITTLE INTEREST OR PLEASURE IN DOING THINGS: 3
SUM OF ALL RESPONSES TO PHQ QUESTIONS 1-9: 15
SUM OF ALL RESPONSES TO PHQ QUESTIONS 1-9: 18
5. POOR APPETITE OR OVEREATING: 3
3. TROUBLE FALLING OR STAYING ASLEEP: 3
8. MOVING OR SPEAKING SO SLOWLY THAT OTHER PEOPLE COULD HAVE NOTICED. OR THE OPPOSITE, BEING SO FIGETY OR RESTLESS THAT YOU HAVE BEEN MOVING AROUND A LOT MORE THAN USUAL: 0
6. FEELING BAD ABOUT YOURSELF - OR THAT YOU ARE A FAILURE OR HAVE LET YOURSELF OR YOUR FAMILY DOWN: 0
10. IF YOU CHECKED OFF ANY PROBLEMS, HOW DIFFICULT HAVE THESE PROBLEMS MADE IT FOR YOU TO DO YOUR WORK, TAKE CARE OF THINGS AT HOME, OR GET ALONG WITH OTHER PEOPLE: 1
4. FEELING TIRED OR HAVING LITTLE ENERGY: 3
SUM OF ALL RESPONSES TO PHQ9 QUESTIONS 1 & 2: 6
2. FEELING DOWN, DEPRESSED OR HOPELESS: 3
SUM OF ALL RESPONSES TO PHQ QUESTIONS 1-9: 15
SUM OF ALL RESPONSES TO PHQ QUESTIONS 1-9: 18
7. TROUBLE CONCENTRATING ON THINGS, SUCH AS READING THE NEWSPAPER OR WATCHING TELEVISION: 3
10. IF YOU CHECKED OFF ANY PROBLEMS, HOW DIFFICULT HAVE THESE PROBLEMS MADE IT FOR YOU TO DO YOUR WORK, TAKE CARE OF THINGS AT HOME, OR GET ALONG WITH OTHER PEOPLE: 1
3. TROUBLE FALLING OR STAYING ASLEEP: 3
1. LITTLE INTEREST OR PLEASURE IN DOING THINGS: 3
9. THOUGHTS THAT YOU WOULD BE BETTER OFF DEAD, OR OF HURTING YOURSELF: 0
SUM OF ALL RESPONSES TO PHQ QUESTIONS 1-9: 18
9. THOUGHTS THAT YOU WOULD BE BETTER OFF DEAD, OR OF HURTING YOURSELF: 0
6. FEELING BAD ABOUT YOURSELF - OR THAT YOU ARE A FAILURE OR HAVE LET YOURSELF OR YOUR FAMILY DOWN: 0
SUM OF ALL RESPONSES TO PHQ9 QUESTIONS 1 & 2: 6

## 2021-07-22 ASSESSMENT — COLUMBIA-SUICIDE SEVERITY RATING SCALE - C-SSRS
1. WITHIN THE PAST MONTH, HAVE YOU WISHED YOU WERE DEAD OR WISHED YOU COULD GO TO SLEEP AND NOT WAKE UP?: NO
6. HAVE YOU EVER DONE ANYTHING, STARTED TO DO ANYTHING, OR PREPARED TO DO ANYTHING TO END YOUR LIFE?: NO
6. HAVE YOU EVER DONE ANYTHING, STARTED TO DO ANYTHING, OR PREPARED TO DO ANYTHING TO END YOUR LIFE?: NO
1. WITHIN THE PAST MONTH, HAVE YOU WISHED YOU WERE DEAD OR WISHED YOU COULD GO TO SLEEP AND NOT WAKE UP?: NO
2. HAVE YOU ACTUALLY HAD ANY THOUGHTS OF KILLING YOURSELF?: NO
2. HAVE YOU ACTUALLY HAD ANY THOUGHTS OF KILLING YOURSELF?: NO

## 2021-07-22 ASSESSMENT — SOCIAL DETERMINANTS OF HEALTH (SDOH): HOW HARD IS IT FOR YOU TO PAY FOR THE VERY BASICS LIKE FOOD, HOUSING, MEDICAL CARE, AND HEATING?: NOT HARD AT ALL

## 2021-07-22 ASSESSMENT — LIFESTYLE VARIABLES
HOW MANY STANDARD DRINKS CONTAINING ALCOHOL DO YOU HAVE ON A TYPICAL DAY: 3 OR 4
HOW OFTEN DO YOU HAVE A DRINK CONTAINING ALCOHOL: 4 OR MORE TIMES A WEEK

## 2021-07-22 NOTE — PROGRESS NOTES
S: Lolita Freire 48 y.o. male  here for hypertension, right leg pain, alcoholism and depression. He is accompanied by his girlfriend Renita Lombardo, who has been giving consent to provide history. Hypertension: Not been taking medication in quite some time. As a result, his blood pressure is elevated. He is prescribed lisinopril 10 mg a day. No signs or symptoms of hypotension or hypertension. Right leg pain: Right radicular lumbar symptoms. Been under the care of PM&R but has canceled or no showed appointments. Pain is worse in his right foot and is described as numbness and tingling. Denies red flag symptoms. Alcoholism: He endorses greater than 5 drinks several times a week. He reports he is using this as a coping mechanism for the pain as well as recent deaths in the family. Depression: PHQ-9=18. Denies SI/HI. He is using alcohol to cope as described above. O: VS: BP (!) 169/105 (Site: Left Upper Arm, Position: Sitting, Cuff Size: Medium Adult)   Pulse 108   Temp 98.6 °F (37 °C) (Temporal)   Ht 5' 8\" (1.727 m)   Wt 155 lb (70.3 kg)   SpO2 97%   BMI 23.57 kg/m²    General: NAD. Evidence that he had been drinking the day of exam.              HEENT: WDL              Neck: WDL   CV:  RRR, no gallops, rubs, or murmurs   Resp: CTAB no R/R/W   Abd:  Soft, nontender, no masses    Ext:  no C/C/E. Positive SLR on the right leg. Right foot exam: Absent sensation on monofilament exam.  No abnormalities to inspection. Assessment/Plan  1. Essential hypertension  Uncontrolled  - lisinopril (PRINIVIL;ZESTRIL) 10 MG tablet; take 1 tablet by mouth daily  Dispense: 30 tablet; Refill: 0  - POCT glycosylated hemoglobin (Hb A1C)     2. Right leg pain  Advised to follow-up with PMNR  - amitriptyline (ELAVIL) 10 MG tablet; Take 1 tablet by mouth nightly  Dispense: 30 tablet; Refill: 3     3.  Chronic low back pain, unspecified back pain laterality, unspecified whether sciatica present  Advised to follow-up and reestablish with physical medicine and rehab  - amitriptyline (ELAVIL) 10 MG tablet; Take 1 tablet by mouth nightly  Dispense: 30 tablet; Refill: 3     4. Alcohol use  -Needs to be seen within the next 2 weeks for close follow-up     5. Current episode of major depressive disorder without prior episode, unspecified depression episode severity  -No SI/HI today. Good social support from girlfriend Judith Holland.  -Close follow-up within 2 weeks to continue to assess symptoms  - amitriptyline (ELAVIL) 10 MG tablet; Take 1 tablet by mouth nightly  Dispense: 30 tablet; Refill: 3        RTO 2 weeks for alcohol use, hypertension, MDD    Assessment/Plan:  1. Right leg pain: Signs and symptoms suggest right lumbar radiculopathy. Other differential may include neuropathy due to B12/folate deficiency due to alcoholism. Patient advised to return to [facility] management of right radicular symptoms. They can determine if he needs any further intervention, such as MRI or neurosurgical referral.  Check CBC, vitamin B12 and folate levels. 2 Hypertension: Poorly controlled. Lisinopril 10 mg a day and follow pressure closely  3. Depression: Objectively severe. Patient has provided reassurance, along with reassurance from girlfriend present, that he is safe to return home and will not harm himself. Trial amitriptyline 10 mg nightly post follow-up      Return in about 2 weeks (around 8/5/2021) for HTN FOLLOW UP / ALCOHOL FOLLOW UP. Follow-up in 1 week to check blood pressure and tach response of symptoms to new medications    Attending Physician Statement  I have discussed the case, including pertinent history and exam findings with the resident. I agree with the documented assessment and plan.          Tiffany Sandoval MD

## 2021-07-22 NOTE — PROGRESS NOTES
CC: Right leg pain/HTN/MDD/alcohol use ------------------------------------------------------------------------------------------------------------------------  Assessment/Plan  1. Essential hypertension  Uncontrolled  - lisinopril (PRINIVIL;ZESTRIL) 10 MG tablet; take 1 tablet by mouth daily  Dispense: 30 tablet; Refill: 0  - POCT glycosylated hemoglobin (Hb A1C)    2. Right leg pain  Advised to follow-up with PMNR  - amitriptyline (ELAVIL) 10 MG tablet; Take 1 tablet by mouth nightly  Dispense: 30 tablet; Refill: 3    3. Chronic low back pain, unspecified back pain laterality, unspecified whether sciatica present  Advised to follow-up and reestablish with physical medicine and rehab  - amitriptyline (ELAVIL) 10 MG tablet; Take 1 tablet by mouth nightly  Dispense: 30 tablet; Refill: 3    4. Alcohol use  -Needs to be seen within the next 2 weeks for close follow-up    5. Current episode of major depressive disorder without prior episode, unspecified depression episode severity  -No SI/HI today. Good social support from girlfriend Dayton Si.  -Close follow-up within 2 weeks to continue to assess symptoms  - amitriptyline (ELAVIL) 10 MG tablet; Take 1 tablet by mouth nightly  Dispense: 30 tablet; Refill: 3      RTO 2 weeks for alcohol use, hypertension, MDD  -------------------------------------------------------------------------------------------------------------------------    HPI:  48 y.o. man presents with girlfriend Shannon Salinas for visit today. Patient gives consent for patient to be present today.     Right Leg Pain  10/10 right LE  Right foot is where it worse, burning, achy crampy  Also in right lower extremity area  Stumbling often due to the pain, fell on right side   Not taking any medication for pain; sleeping a lot and drinking alcohol to cope with pain  PT - [facility] completed PT  Physical Medicine was following- could not make appointment for epidural steroid injection  Interested to re-establishing  Feels minimal relief from the physical therapy    No bowel/bladder incontinence weakness in legs    Lumbar Radiculopathy  MRI Lumbar- lumbar radiculopathy    HTN  Not consistently taking lisinopril 10 mg daily  No chest pain  No sob  No headache  No syncope  No blurry vision  BP Cuff at home- not taking it    MDD  PHQ: 25 today  Death in family, sister passed away December 2021  Also losses of nephew, due to unseen circumstances  Does not want to talk about his depression today  Alcohol use to cope with feelings , >5 drinks on \"some days of the week\"  No other substance use  No SI/HI    Patient Active Problem List    Diagnosis Date Noted    Chronic bilateral low back pain with right-sided sciatica 06/09/2020    Lumbar radiculopathy 04/06/2020    Essential hypertension 04/06/2020       Past Medical History:   Diagnosis Date    Essential hypertension 4/6/2020    Lumbar radiculopathy 4/6/2020       Current Outpatient Medications on File Prior to Visit   Medication Sig Dispense Refill    lisinopril (PRINIVIL;ZESTRIL) 10 MG tablet take 1 tablet by mouth daily 30 tablet 0    senna-docusate (PERICOLACE) 8.6-50 MG per tablet Take 2 tablets by mouth daily as needed for Constipation (Patient not taking: Reported on 7/22/2021) 30 tablet 0     No current facility-administered medications on file prior to visit. Allergies   Allergen Reactions    Medrol [Methylprednisolone] Diarrhea and Other (See Comments)     Severe GI upset, bloody stool, dry skin, shakiness       Family History   Problem Relation Age of Onset    Cancer Mother     Asthma Mother     Hypertension Father        No past surgical history on file. Social History     Tobacco Use    Smoking status: Never Smoker    Smokeless tobacco: Never Used   Vaping Use    Vaping Use: Never used   Substance Use Topics    Alcohol use:  Yes     Alcohol/week: 3.0 standard drinks     Types: 3 Cans of beer per week     Comment: 3 a week     Drug use: No       ROS:    Review of Systems   Constitutional: +decreased appetite, +chills  HENT: Negative for congestion and sore throat. Respiratory: Negative for choking and chest tightness. Cardiovascular: Negative for chest pain, palpitations and leg swelling. Gastrointestinal: Negative for abdominal distention and abdominal pain. Genitourinary: Negative for difficulty urinating and dysuria. Musculoskeletal:+Back Pain, +Arthrlagias  Skin: Negative for color change and pallor. Neurological: Negative for facial asymmetry and headaches. Psychiatric/Behavioral:s. Objective:    VS:  Blood pressure (!) 169/105, pulse 108, temperature 98.6 °F (37 °C), temperature source Temporal, height 5' 8\" (1.727 m), weight 155 lb (70.3 kg), SpO2 97 %. Physical Exam   Constitutional: Appears to be intoxicated. Is oriented to his name. HENT:   Head: Normocephalic and atraumatic. Eyes: EOM are normal.   Neck: Neck supple. Cardiovascular: Normal rate, regular rhythm and intact distal pulses. Exam reveals no gallop and no friction rub. No murmur heard. Pulmonary/Chest: Effort normal and breath sounds normal. No wheezes. No rhales. Abdominal: Soft. Bowel sounds are normal. No distension. No abdominal tenderness. Musculoskeletal: Tender to palpation right paraspinal area. Positive SLR right leg. Strength 5 out of 5  Neurological: DTRs 2+ patella symmetrical.  Gait assessed normal.   Skin: Skin is warm and dry. No rash noted. No erythema. Psychiatric: Withdrawn, flat affect. Content intact      Plans:  As above. Please see Patient Instructions for further counseling and information given. Advised to please be adherent to the treatment plans discussed today, and please call with any questions or concerns, letting the office know of any reasons that the plans may not be followed. The risks of untreated conditions include worsening illness, injury, disability, and possibly, death.  Please call if symptoms change in any way, worsen, or fail to completely resolve, as this could necessitate a change to treatment plans. Patient and/or caregiver expressed understanding. Indications and proper use of medication(s) reviewed. Potential side-effects and risks of medication(s) also explained. Patient and/or caregiver was instructed to call if any new symptoms develop prior to next visit. Health risk factors discussed and addressed.

## 2021-08-02 ENCOUNTER — TELEPHONE (OUTPATIENT)
Dept: FAMILY MEDICINE CLINIC | Age: 51
End: 2021-08-02

## 2021-08-02 NOTE — TELEPHONE ENCOUNTER
----- Message from Alto Closs sent at 8/2/2021 10:38 AM EDT -----  Subject: Message to Provider    QUESTIONS  Information for Provider? Patient needs another referral to a different   facility for pain specialist. NOTE? He has been dismissed for his   behavior. ---------------------------------------------------------------------------  --------------  Thelma [employer] INFO  What is the best way for the office to contact you? OK to leave message on   voicemail  Preferred Call Back Phone Number? [phone]  ---------------------------------------------------------------------------  --------------  SCRIPT ANSWERS  Relationship to Patient?  Self

## 2021-08-27 ENCOUNTER — OFFICE VISIT (OUTPATIENT)
Dept: FAMILY MEDICINE CLINIC | Age: 51
End: 2021-08-27
Payer: COMMERCIAL

## 2021-08-27 VITALS
SYSTOLIC BLOOD PRESSURE: 160 MMHG | OXYGEN SATURATION: 97 % | TEMPERATURE: 97.5 F | WEIGHT: 158 LBS | DIASTOLIC BLOOD PRESSURE: 117 MMHG | HEART RATE: 104 BPM | BODY MASS INDEX: 23.95 KG/M2 | HEIGHT: 68 IN

## 2021-08-27 DIAGNOSIS — I10 ESSENTIAL HYPERTENSION: ICD-10-CM

## 2021-08-27 DIAGNOSIS — M79.604 RIGHT LEG PAIN: ICD-10-CM

## 2021-08-27 DIAGNOSIS — F32.9 CURRENT EPISODE OF MAJOR DEPRESSIVE DISORDER WITHOUT PRIOR EPISODE, UNSPECIFIED DEPRESSION EPISODE SEVERITY: ICD-10-CM

## 2021-08-27 DIAGNOSIS — G89.29 CHRONIC LOW BACK PAIN, UNSPECIFIED BACK PAIN LATERALITY, UNSPECIFIED WHETHER SCIATICA PRESENT: ICD-10-CM

## 2021-08-27 DIAGNOSIS — M54.50 CHRONIC LOW BACK PAIN, UNSPECIFIED BACK PAIN LATERALITY, UNSPECIFIED WHETHER SCIATICA PRESENT: ICD-10-CM

## 2021-08-27 PROCEDURE — G8427 DOCREV CUR MEDS BY ELIG CLIN: HCPCS | Performed by: FAMILY MEDICINE

## 2021-08-27 PROCEDURE — G8420 CALC BMI NORM PARAMETERS: HCPCS | Performed by: FAMILY MEDICINE

## 2021-08-27 PROCEDURE — 1036F TOBACCO NON-USER: CPT | Performed by: FAMILY MEDICINE

## 2021-08-27 PROCEDURE — 99213 OFFICE O/P EST LOW 20 MIN: CPT | Performed by: STUDENT IN AN ORGANIZED HEALTH CARE EDUCATION/TRAINING PROGRAM

## 2021-08-27 PROCEDURE — 99212 OFFICE O/P EST SF 10 MIN: CPT | Performed by: STUDENT IN AN ORGANIZED HEALTH CARE EDUCATION/TRAINING PROGRAM

## 2021-08-27 PROCEDURE — 3017F COLORECTAL CA SCREEN DOC REV: CPT | Performed by: FAMILY MEDICINE

## 2021-08-27 RX ORDER — LISINOPRIL 10 MG/1
TABLET ORAL
Qty: 30 TABLET | Refills: 0 | Status: SHIPPED
Start: 2021-08-27 | End: 2021-08-27

## 2021-08-27 RX ORDER — LISINOPRIL 20 MG/1
TABLET ORAL
Qty: 90 TABLET | Refills: 0 | Status: SHIPPED
Start: 2021-08-27 | End: 2021-11-29

## 2021-08-27 RX ORDER — AMITRIPTYLINE HYDROCHLORIDE 10 MG/1
10 TABLET, FILM COATED ORAL NIGHTLY
Qty: 30 TABLET | Refills: 3 | Status: SHIPPED
Start: 2021-08-27 | End: 2021-12-02 | Stop reason: SDUPTHER

## 2021-08-27 NOTE — PROGRESS NOTES
CC: HTN/Chronic LBP,leg Pain/MDD  ------------------------------------------------------------------------------------------------------------------------  Assessment/Plan  1. Essential hypertension     Diagnosis Orders   1. Essential hypertension  lisinopril (PRINIVIL;ZESTRIL) 20 MG tablet    DISCONTINUED: lisinopril (PRINIVIL;ZESTRIL) 10 MG tablet   2. Right leg pain  amitriptyline (ELAVIL) 10 MG tablet    Capo Marquis DO. [facility], [place]   3. Chronic low back pain, unspecified back pain laterality, unspecified whether sciatica present  amitriptyline (ELAVIL) 10 MG tablet    Capo Marquis,  [facility], [place]   4. Current episode of major depressive disorder without prior episode, unspecified depression episode severity  amitriptyline (ELAVIL) 10 MG tablet  Refer [facility] for counseling         RTO 1 month for follow-up HTN/Colon Cancer Screening  -------------------------------------------------------------------------------------------------------------------------    HPI:  48 y.o. 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 Depression  Elavil 10 mg  \"Mueller\"   Feels the pain in leg is worsening the mood, also recent losses(see last clinic)  Alcohol use to cope with mood, has cut down from last visit, went a period where quit dirnking for 2 weeks.     No SI/HI    Interested in prior to next visit. Health risk factors discussed and addressed.

## 2021-08-27 NOTE — PROGRESS NOTES
S: 48 y.o. male here for HTN, leg pain, depression. HTN not controlled. Lisinopril 10. Drinks 4 24 oz beers per day, down from 6-7 per day. Avoided EtOH for 2 wks at one time. Leg pain, chronic, R. Also back pain. Wants referral to PMR in [place]. Can ambulate. R lower back, shooting down hamstring. Depression. Elavil. Somewhat improved from previous. O: VS: BP (!) 160/117 (Site: Right Upper Arm, Position: Sitting, Cuff Size: Medium Adult)   Pulse 104   Temp 97.5 °F (36.4 °C) (Temporal)   Ht 5' 8\" (1.727 m)   Wt 158 lb (71.7 kg)   SpO2 97%   BMI 24.02 kg/m²    General: NAD, alert and interacting appropriately. CV:  RRR, no gallops, rubs, or murmurs    Resp: CTAB   Abd:  Soft, nontender   Ext:  No edema. Normal reflexes patellar   Back: neg SLR. No midline ttp. Impression:  HTN, leg pain, depression. Plan:   Increase lisinopril  PMR referral  Refer to [facility]    Attending Physician Statement  I have discussed the case, including pertinent history and exam findings with the resident. I agree with the documented assessment and plan.

## 2021-09-13 ENCOUNTER — TELEPHONE (OUTPATIENT)
Dept: FAMILY MEDICINE CLINIC | Age: 51
End: 2021-09-13

## 2021-09-13 DIAGNOSIS — M54.50 LOW BACK PAIN, UNSPECIFIED BACK PAIN LATERALITY, UNSPECIFIED CHRONICITY, UNSPECIFIED WHETHER SCIATICA PRESENT: Primary | ICD-10-CM

## 2021-09-13 NOTE — TELEPHONE ENCOUNTER
----- Message from Gloria Colon sent at 9/10/2021 12:07 PM EDT -----  Subject: Message to Provider    QUESTIONS  Information for Provider? Patient said his daughter received a phone call   about a pain specialist in [facility]. They were wondering if the   specialist in [facility] is a [facility] Provider. They were interested in   seeing a Specialist in [facility]. Please give them a call.   ---------------------------------------------------------------------------  --------------  CALL BACK INFO  What is the best way for the office to contact you? OK to leave message on   voicemail  Preferred Call Back Phone Number? [phone]  ---------------------------------------------------------------------------  --------------  SCRIPT ANSWERS  Relationship to Patient?  Self

## 2021-09-13 NOTE — TELEPHONE ENCOUNTER
----- Message from Sina Navas sent at 9/10/2021 12:07 PM EDT -----  Subject: Message to Provider    QUESTIONS  Information for Provider? Patient said his daughter received a phone call   about a pain specialist in [facility]. They were wondering if the   specialist in [facility] is a [facility] Provider. They were interested in   seeing a Specialist in [facility]. Please give them a call.   ---------------------------------------------------------------------------  --------------  CALL BACK INFO  What is the best way for the office to contact you? OK to leave message on   voicemail  Preferred Call Back Phone Number? [phone]  ---------------------------------------------------------------------------  --------------  SCRIPT ANSWERS  Relationship to Patient?  Self

## 2021-09-13 NOTE — TELEPHONE ENCOUNTER
I phoned patient   patient wants to go to [facility]   Dr Chris Wilkerson  P# [phone]

## 2021-09-27 ENCOUNTER — TELEPHONE (OUTPATIENT)
Dept: FAMILY MEDICINE CLINIC | Age: 51
End: 2021-09-27

## 2021-09-27 DIAGNOSIS — M54.50 LOW BACK PAIN, UNSPECIFIED BACK PAIN LATERALITY, UNSPECIFIED CHRONICITY, UNSPECIFIED WHETHER SCIATICA PRESENT: Primary | ICD-10-CM

## 2021-09-27 NOTE — TELEPHONE ENCOUNTER
----- [facility] from [facility] sent at 9/27/2021  1:01 PM EDT -----  Subject: Message to Provider    QUESTIONS  Information for Provider? waiting on referral for Dr. Tonie Bernard in   [facility], please fax [phone]  ---------------------------------------------------------------------------  --------------  [address]  What is the best way for the office to contact you? OK to leave message on   voicemail  Preferred Call Back Phone Number? [phone]  ---------------------------------------------------------------------------  --------------  SCRIPT ANSWERS  Relationship to Patient?  Self

## 2021-09-27 NOTE — TELEPHONE ENCOUNTER
I have placed a referral thank you- I attempted to call patient to let him know I did this he did not

## 2021-09-28 ENCOUNTER — TELEPHONE (OUTPATIENT)
Dept: FAMILY MEDICINE CLINIC | Age: 51
End: 2021-09-28

## 2021-09-28 DIAGNOSIS — M79.604 RIGHT LEG PAIN: ICD-10-CM

## 2021-09-28 DIAGNOSIS — I10 ESSENTIAL HYPERTENSION: ICD-10-CM

## 2021-09-28 DIAGNOSIS — M54.50 LOW BACK PAIN, UNSPECIFIED BACK PAIN LATERALITY, UNSPECIFIED CHRONICITY, UNSPECIFIED WHETHER SCIATICA PRESENT: Primary | ICD-10-CM

## 2021-09-28 NOTE — TELEPHONE ENCOUNTER
----- Message from Chester sent at 9/27/2021  5:00 PM EDT -----  Subject: Referral Request    QUESTIONS   Reason for referral request? Patient needs a referral to see a pain   specialist. Would like to see Xavier Gutierrez Ph# [phone] Kisha Florence   #[phone] He would like to see one of these DR   Has the physician seen you for this condition before? No   Preferred Specialist (if applicable)? Do you already have an appointment scheduled? Additional Information for Provider?   ---------------------------------------------------------------------------  --------------  CALL BACK INFO  What is the best way for the office to contact you? OK to leave message on   voicemail  Preferred Call Back Phone Number?  [phone]

## 2021-11-25 DIAGNOSIS — I10 ESSENTIAL HYPERTENSION: ICD-10-CM

## 2021-11-29 RX ORDER — LISINOPRIL 20 MG/1
TABLET ORAL
Qty: 90 TABLET | Refills: 0 | Status: SHIPPED
Start: 2021-11-29 | End: 2022-02-18

## 2021-11-29 NOTE — TELEPHONE ENCOUNTER
Last Appointment:  8/27/2021  Future Appointments   Date Time Provider Vlad Israel   12/2/2021  1:00 PM [id number] MD Mary Alice Lorenzo [facility]

## 2021-12-02 ENCOUNTER — OFFICE VISIT (OUTPATIENT)
Dept: FAMILY MEDICINE CLINIC | Age: 51
End: 2021-12-02
Payer: COMMERCIAL

## 2021-12-02 VITALS
BODY MASS INDEX: 26.22 KG/M2 | HEART RATE: 84 BPM | TEMPERATURE: 97.3 F | HEIGHT: 68 IN | RESPIRATION RATE: 16 BRPM | WEIGHT: 173 LBS | SYSTOLIC BLOOD PRESSURE: 136 MMHG | OXYGEN SATURATION: 97 % | DIASTOLIC BLOOD PRESSURE: 76 MMHG

## 2021-12-02 DIAGNOSIS — I10 HYPERTENSION, UNSPECIFIED TYPE: ICD-10-CM

## 2021-12-02 DIAGNOSIS — Z76.0 MEDICATION REFILL: ICD-10-CM

## 2021-12-02 DIAGNOSIS — J02.9 SORE THROAT: Primary | ICD-10-CM

## 2021-12-02 DIAGNOSIS — Z23 NEED FOR INFLUENZA VACCINATION: ICD-10-CM

## 2021-12-02 PROCEDURE — 90686 IIV4 VACC NO PRSV 0.5 ML IM: CPT

## 2021-12-02 PROCEDURE — 1036F TOBACCO NON-USER: CPT | Performed by: FAMILY MEDICINE

## 2021-12-02 PROCEDURE — 3017F COLORECTAL CA SCREEN DOC REV: CPT | Performed by: FAMILY MEDICINE

## 2021-12-02 PROCEDURE — G8427 DOCREV CUR MEDS BY ELIG CLIN: HCPCS | Performed by: FAMILY MEDICINE

## 2021-12-02 PROCEDURE — G8482 FLU IMMUNIZE ORDER/ADMIN: HCPCS | Performed by: FAMILY MEDICINE

## 2021-12-02 PROCEDURE — G0008 ADMIN INFLUENZA VIRUS VAC: HCPCS

## 2021-12-02 PROCEDURE — 99213 OFFICE O/P EST LOW 20 MIN: CPT | Performed by: STUDENT IN AN ORGANIZED HEALTH CARE EDUCATION/TRAINING PROGRAM

## 2021-12-02 PROCEDURE — G8419 CALC BMI OUT NRM PARAM NOF/U: HCPCS | Performed by: FAMILY MEDICINE

## 2021-12-02 PROCEDURE — 6360000002 HC RX W HCPCS

## 2021-12-02 PROCEDURE — 99212 OFFICE O/P EST SF 10 MIN: CPT | Performed by: STUDENT IN AN ORGANIZED HEALTH CARE EDUCATION/TRAINING PROGRAM

## 2021-12-02 RX ORDER — AMITRIPTYLINE HYDROCHLORIDE 10 MG/1
10 TABLET, FILM COATED ORAL NIGHTLY
Qty: 30 TABLET | Refills: 3 | Status: SHIPPED
Start: 2021-12-02 | End: 2022-04-05 | Stop reason: SDUPTHER

## 2021-12-02 RX ORDER — BENZOCAINE/MENTHOL 6 MG-10 MG
1 LOZENGE MUCOUS MEMBRANE
Qty: 30 LOZENGE | Refills: 0 | Status: SHIPPED
Start: 2021-12-02 | End: 2022-04-05

## 2021-12-02 NOTE — PROGRESS NOTES
Vaccine Information Sheet, \"Influenza - Inactivated\"  given to Arelis Nicholas, or parent/legal guardian of  Arelis Nicholas and verbalized understanding. Patient responses:    Have you ever had a reaction to a flu vaccine? No  Do you have any current illness? No  Have you ever had Guillian [place] Syndrome? No  Do you have a serious allergy to any of the follow: Neomycin, Polymyxin, Thimerosal, eggs or egg products? No    Flu vaccine given per order. Please see immunization tab. Risks and benefits explained. Current VIS given.

## 2021-12-02 NOTE — PROGRESS NOTES
S: 48 y.o. male with   Chief Complaint   Patient presents with    Hypertension    Pharyngitis     started yesterday  no fever or chills        44-year-old man presents for concerns of a sore throat and is also here for follow-up of hypertension. Sore throat1 day onset not painful to eat or drink or swallow. No fevers or chills. Unvaccinated against COVID-19. Unsure if he had sick contacts. No rhinorrhea, sinus pain. Does have a cough with light yellow mucus    Hypertensionlisinopril 20. Compliant. ROS as in note below. Quit drinking 3 weeks ago. O: VS:  height is 5' 8\" (1.727 m) and weight is 173 lb (78.5 kg). His temporal temperature is 97.3 °F (36.3 °C). His blood pressure is 136/76 and his pulse is 84. His respiration is 16 and oxygen saturation is 97%. AAO/NAD, appropriate affect for mood   ENToropharynx mildly erythematous, no oral exudate. Uvula midline. No boggy tenderness of the nares, no sinus pressure or tenderness. Neckno lymphadenopathy, supple ROM intact  CV:  RRR, no murmur  Resp: CTAB    Impression/Plan:   1) sore throatlikely viral pharyngitis. Supportive care. Ordered lozenges for comfort. Encourage hydration. Patient is going to get a Covid test after leaving  2) hypertensioncontrolled continue current plan of management. Commended him for alcohol cessation   3) need for flu shotinfluenza shot today  Return in 3 months for hypertension and further healthcare maintenance items    Health Maintenance Due   Topic Date Due    COVID-19 Vaccine (1) Never done    Colon cancer screen colonoscopy  Never done    Shingles Vaccine (1 of 2) Never done         Attending Physician Statement  I have discussed the case, including pertinent history and exam findings with the resident. I agree with the documented assessment and plan.   yes    Genna Rodriguez MD

## 2021-12-02 NOTE — PROGRESS NOTES
CC:  HTN/Pharyngitis  ------------------------------------------------------------------------------------------------------------------------  Assessment/Plan     Diagnosis Orders   1. Sore throat  COVID-19 test; going to drive through after this  Lozenges for relief   2. Hypertension, unspecified type  Continue current plan of management   3. Medication refill  amitriptyline (ELAVIL) 10 MG tablet   4. Need for influenza vaccination  INFLUENZA, QUADV, 3 YRS AND OLDER, IM PF, PREFILL SYR OR SDV, 0.5ML (AFLURIA QUADV, PF)         RTO 1 month for follow-up of HTN/HCM  -------------------------------------------------------------------------------------------------------------------------    HPI:  48 y.o. man presents for a follow-up of HTN/Pharyngitis    HTN  Lisinopril 20 mg  No chest pain   No sob, chest tightness, vision changes, lightheadedness    Pharyngitis-   1 day onset sore throat  Painful  Eating and drinking as per normal; drinking water  No neck pain  Irritating  Feels like he has to cough something up constantly  Unsure if has COVID-19 exposure    Light yellow mucous coming up  No rhinorrhea, sinus pressure, fevers/chills, ear pain    Mood Changes  Counseling sessions    Patient Active Problem List    Diagnosis Date Noted    Chronic bilateral low back pain with right-sided sciatica 06/09/2020    Lumbar radiculopathy 04/06/2020    Essential hypertension 04/06/2020       Past Medical History:   Diagnosis Date    Alcohol use     Essential hypertension 04/06/2020    Lumbar radiculopathy 04/06/2020       Current Outpatient Medications on File Prior to Visit   Medication Sig Dispense Refill    lisinopril (PRINIVIL;ZESTRIL) 20 MG tablet take 1 tablet by mouth once daily 90 tablet 0    amitriptyline (ELAVIL) 10 MG tablet Take 1 tablet by mouth nightly 30 tablet 3     No current facility-administered medications on file prior to visit.        Allergies   Allergen Reactions    Medrol [Methylprednisolone] Diarrhea and Other (See Comments)     Severe GI upset, bloody stool, dry skin, shakiness       Family History   Problem Relation Age of Onset    Cancer Mother     Asthma Mother     Hypertension Father        History reviewed. No pertinent surgical history. Social History     Tobacco Use    Smoking status: Never Smoker    Smokeless tobacco: Never Used   Vaping Use    Vaping Use: Never used   Substance Use Topics    Alcohol use: Yes     Alcohol/week: 5.0 standard drinks     Types: 3 Cans of beer, 2 Shots of liquor per week     Comment: 3 a week     Drug use: No       ROS:    Review of Systems   Constitutional: Negative for activity change, appetite change, chills and fatigue. HENT: sore throat, negative for congestion rhinorrhea  Respiratory: Negative for choking and chest tightness. Cardiovascular: Negative for chest pain, palpitations and leg swelling. Gastrointestinal: Negative for abdominal distention and abdominal pain. Genitourinary: Negative for difficulty urinating and dysuria. Musculoskeletal: Negative for arthralgias and back pain. Skin: Negative for color change and pallor. Neurological: Negative for facial asymmetry and headaches. Psychiatric/Behavioral: Negative for behavioral problems. The patient is not nervous/anxious. Objective:    VS:  Height 5' 8\" (1.727 m), weight 173 lb (78.5 kg). Physical Exam   Constitutional: Oriented to person, place, and time. Well-developed and well-nourished. HENT:   Head: Normocephalic and atraumatic. Eyes: EOM are normal.  Throat: mild erythema of oropharynx   Neck: Neck supple. Cardiovascular: Normal rate, regular rhythm and intact distal pulses. Exam reveals no gallop and no friction rub. No murmur heard. Pulmonary/Chest: Effort normal and breath sounds normal. No wheezes. No rhales. Abdominal: Soft. Bowel sounds are normal. No distension. No abdominal tenderness. Musculoskeletal: Normal range of motion. General: No edema. Neurological: Alert and oriented to person, place, and time. Skin: Skin is warm and dry. No rash noted. No erythema. Psychiatric: Normal mood and affect. Behavior is normal.   Nursing note and vitals reviewed. Plans:  As above. Please see Patient Instructions for further counseling and information given. Advised to please be adherent to the treatment plans discussed today, and please call with any questions or concerns, letting the office know of any reasons that the plans may not be followed. The risks of untreated conditions include worsening illness, injury, disability, and possibly, death. Please call if symptoms change in any way, worsen, or fail to completely resolve, as this could necessitate a change to treatment plans. Patient and/or caregiver expressed understanding. Indications and proper use of medication(s) reviewed. Potential side-effects and risks of medication(s) also explained. Patient and/or caregiver was instructed to call if any new symptoms develop prior to next visit. Health risk factors discussed and addressed.

## 2022-02-18 DIAGNOSIS — I10 ESSENTIAL HYPERTENSION: ICD-10-CM

## 2022-02-18 RX ORDER — LISINOPRIL 20 MG/1
TABLET ORAL
Qty: 90 TABLET | Refills: 0 | Status: SHIPPED | OUTPATIENT
Start: 2022-02-18

## 2022-04-05 ENCOUNTER — OFFICE VISIT (OUTPATIENT)
Dept: FAMILY MEDICINE CLINIC | Age: 52
End: 2022-04-05
Payer: COMMERCIAL

## 2022-04-05 VITALS
BODY MASS INDEX: 26.37 KG/M2 | HEIGHT: 68 IN | OXYGEN SATURATION: 99 % | RESPIRATION RATE: 18 BRPM | HEART RATE: 83 BPM | WEIGHT: 174 LBS | DIASTOLIC BLOOD PRESSURE: 102 MMHG | TEMPERATURE: 97 F | SYSTOLIC BLOOD PRESSURE: 167 MMHG

## 2022-04-05 DIAGNOSIS — Z12.11 COLON CANCER SCREENING: ICD-10-CM

## 2022-04-05 DIAGNOSIS — Z76.0 MEDICATION REFILL: ICD-10-CM

## 2022-04-05 DIAGNOSIS — Z20.2 STD EXPOSURE: ICD-10-CM

## 2022-04-05 DIAGNOSIS — I10 HYPERTENSION, UNSPECIFIED TYPE: ICD-10-CM

## 2022-04-05 DIAGNOSIS — I10 HYPERTENSION, UNSPECIFIED TYPE: Primary | ICD-10-CM

## 2022-04-05 LAB
ALBUMIN SERPL-MCNC: 4.6 G/DL (ref 3.5–5.2)
ALP BLD-CCNC: 52 U/L (ref 40–129)
ALT SERPL-CCNC: 23 U/L (ref 0–40)
ANION GAP SERPL CALCULATED.3IONS-SCNC: 14 MMOL/L (ref 7–16)
AST SERPL-CCNC: 22 U/L (ref 0–39)
BILIRUB SERPL-MCNC: 0.2 MG/DL (ref 0–1.2)
BILIRUBIN URINE: NEGATIVE
BILIRUBIN, POC: NEGATIVE
BLOOD URINE, POC: NEGATIVE
BLOOD, URINE: NEGATIVE
BUN BLDV-MCNC: 20 MG/DL (ref 6–20)
CALCIUM SERPL-MCNC: 9.7 MG/DL (ref 8.6–10.2)
CHLORIDE BLD-SCNC: 106 MMOL/L (ref 98–107)
CLARITY, POC: NORMAL
CLARITY: CLEAR
CO2: 20 MMOL/L (ref 22–29)
COLOR, POC: NORMAL
COLOR: YELLOW
CREAT SERPL-MCNC: 1 MG/DL (ref 0.7–1.2)
GFR AFRICAN AMERICAN: >60
GFR NON-AFRICAN AMERICAN: >60 ML/MIN/1.73
GLUCOSE BLD-MCNC: 93 MG/DL (ref 74–99)
GLUCOSE URINE, POC: NEGATIVE
GLUCOSE URINE: NEGATIVE MG/DL
KETONES, POC: NEGATIVE
KETONES, URINE: NEGATIVE MG/DL
LEUKOCYTE EST, POC: NEGATIVE
LEUKOCYTE ESTERASE, URINE: NEGATIVE
NITRITE, POC: NEGATIVE
NITRITE, URINE: NEGATIVE
PH UA: 6 (ref 5–9)
PH, POC: 6
POTASSIUM SERPL-SCNC: 4 MMOL/L (ref 3.5–5)
PROTEIN UA: NEGATIVE MG/DL
PROTEIN, POC: NEGATIVE
SODIUM BLD-SCNC: 140 MMOL/L (ref 132–146)
SPECIFIC GRAVITY UA: 1.02 (ref 1–1.03)
SPECIFIC GRAVITY, POC: >=1.03
TOTAL PROTEIN: 7.8 G/DL (ref 6.4–8.3)
UROBILINOGEN, POC: NORMAL
UROBILINOGEN, URINE: 0.2 E.U./DL

## 2022-04-05 PROCEDURE — 99213 OFFICE O/P EST LOW 20 MIN: CPT | Performed by: FAMILY MEDICINE

## 2022-04-05 PROCEDURE — 36415 COLL VENOUS BLD VENIPUNCTURE: CPT | Performed by: FAMILY MEDICINE

## 2022-04-05 PROCEDURE — G8419 CALC BMI OUT NRM PARAM NOF/U: HCPCS | Performed by: FAMILY MEDICINE

## 2022-04-05 PROCEDURE — G8427 DOCREV CUR MEDS BY ELIG CLIN: HCPCS | Performed by: FAMILY MEDICINE

## 2022-04-05 PROCEDURE — 1036F TOBACCO NON-USER: CPT | Performed by: FAMILY MEDICINE

## 2022-04-05 PROCEDURE — 99212 OFFICE O/P EST SF 10 MIN: CPT | Performed by: STUDENT IN AN ORGANIZED HEALTH CARE EDUCATION/TRAINING PROGRAM

## 2022-04-05 PROCEDURE — 3017F COLORECTAL CA SCREEN DOC REV: CPT | Performed by: FAMILY MEDICINE

## 2022-04-05 PROCEDURE — 81002 URINALYSIS NONAUTO W/O SCOPE: CPT | Performed by: STUDENT IN AN ORGANIZED HEALTH CARE EDUCATION/TRAINING PROGRAM

## 2022-04-05 RX ORDER — LISINOPRIL AND HYDROCHLOROTHIAZIDE 20; 12.5 MG/1; MG/1
1 TABLET ORAL DAILY
Qty: 30 TABLET | Refills: 3 | Status: SHIPPED
Start: 2022-04-05 | End: 2022-07-20 | Stop reason: SDUPTHER

## 2022-04-05 RX ORDER — LISINOPRIL 20 MG/1
TABLET ORAL
Qty: 90 TABLET | Refills: 0 | Status: CANCELLED | OUTPATIENT
Start: 2022-04-05

## 2022-04-05 RX ORDER — AMITRIPTYLINE HYDROCHLORIDE 10 MG/1
10 TABLET, FILM COATED ORAL NIGHTLY
Qty: 30 TABLET | Refills: 3 | Status: SHIPPED
Start: 2022-04-05 | End: 2022-07-20 | Stop reason: SDUPTHER

## 2022-04-05 RX ORDER — VALACYCLOVIR HYDROCHLORIDE 1 G/1
1000 TABLET, FILM COATED ORAL DAILY
Qty: 5 TABLET | Refills: 3 | Status: SHIPPED
Start: 2022-04-05 | End: 2022-04-11 | Stop reason: SDUPTHER

## 2022-04-05 NOTE — PROGRESS NOTES
CC: Hypertension follow-up  ------------------------------------------------------------------------------------------------------------------------  Assessment/Plan  1. Hypertension, unspecified type    - Comprehensive Metabolic Panel; Future  - lisinopril-hydroCHLOROthiazide (PRINZIDE;ZESTORETIC) 20-12.5 MG per tablet; Take 1 tablet by mouth daily  Dispense: 30 tablet; Refill: 3    2. STD exposure    - POCT Urinalysis no Micro  - Urinalysis; Future  - Culture, Urine; Future  - valACYclovir (VALTREX) 1 g tablet; Take 1 tablet by mouth daily for 5 days  Dispense: 5 tablet; Refill: 3  - C.TRACHOMATIS [place] Safe; Future  - Hepatitis C Antibody; Future  - HIV Screen; Future    3. Colon cancer screening    - Fecal DNA Colorectal cancer screening (Cologuard)    4. Medication refill    - amitriptyline (ELAVIL) 10 MG tablet; Take 1 tablet by mouth nightly  Dispense: 30 tablet;  Refill: 3  - Fecal DNA Colorectal cancer screening (Cologuard)      RTO 2-4 weeks for follow up HTN  -------------------------------------------------------------------------------------------------------------------------    HPI:  46 y.o. man presents for follow-up HTN/STD Exposure    HTN  Lisinopril 20 mg  No chest pain   No sob, chest tightness, vision changes, lightheadedness     Colon cancer screening    STD Exposure Concern  Tip of penis tingles  Sexually active with a new partner- no condom use  Started 1 week ago    No discharge from penis  No dysuria  No incomplete stream  No incomplete stream    No rash on scrotum  No rash on penis tip      Patient Active Problem List    Diagnosis Date Noted    Chronic bilateral low back pain with right-sided sciatica 06/09/2020    Lumbar radiculopathy 04/06/2020    Essential hypertension 04/06/2020       Past Medical History:   Diagnosis Date    Alcohol use     Essential hypertension 04/06/2020    Lumbar radiculopathy 04/06/2020       Current Outpatient Medications on File Prior to Visit Medication Sig Dispense Refill    lisinopril (PRINIVIL;ZESTRIL) 20 MG tablet take 1 tablet by mouth once daily 90 tablet 0    amitriptyline (ELAVIL) 10 MG tablet Take 1 tablet by mouth nightly 30 tablet 3    Benzocaine-Menthol (RA THROAT LOZENGES) 6-10 MG LOZG lozenge Take 1 lozenge by mouth every 2 hours as needed for Sore Throat 30 lozenge 0     No current facility-administered medications on file prior to visit. Allergies   Allergen Reactions    Medrol [Methylprednisolone] Diarrhea and Other (See Comments)     Severe GI upset, bloody stool, dry skin, shakiness       Family History   Problem Relation Age of Onset    Cancer Mother     Asthma Mother     Hypertension Father        No past surgical history on file. Social History     Tobacco Use    Smoking status: Never Smoker    Smokeless tobacco: Never Used   Vaping Use    Vaping Use: Never used   Substance Use Topics    Alcohol use: Yes     Alcohol/week: 5.0 standard drinks     Types: 3 Cans of beer, 2 Shots of liquor per week     Comment: 3 a week     Drug use: No       ROS:    Review of Systems   Constitutional: Negative for activity change, appetite change, chills and fatigue. HENT: Negative for congestion and sore throat. Respiratory: Negative for choking and chest tightness. Cardiovascular: Negative for chest pain, palpitations and leg swelling. Gastrointestinal: Negative for abdominal distention and abdominal pain. Genitourinary: Tingling of  area negative for dysuria   Musculoskeletal: Negative for arthralgias and back pain. Skin: Negative for color change and pallor. Neurological: Negative for facial asymmetry and headaches. Psychiatric/Behavioral: Negative for behavioral problems. The patient is not nervous/anxious. Objective:    VS:  Blood pressure (!) 167/102, pulse 83, temperature 97 °F (36.1 °C), temperature source Temporal, resp. rate 18, height 5' 8\" (1.727 m), weight 174 lb (78.9 kg), SpO2 99 %. Physical Exam   Constitutional: Oriented to person, place, and time. Well-developed and well-nourished. HENT:   Head: Normocephalic and atraumatic. Eyes: EOM are normal.   Neck: Neck supple. Cardiovascular: Normal rate, regular rhythm and intact distal pulses. Exam reveals no gallop and no friction rub. No murmur heard. Pulmonary/Chest: Effort normal and breath sounds normal. No wheezes. No rhales. Abdominal: Soft. Bowel sounds are normal. No distension. No abdominal tenderness. Musculoskeletal: Normal range of motion. General: No edema. Neurological: Alert and oriented to person, place, and time. Skin: Skin is warm and dry. No rash noted. No erythema. Psychiatric: Normal mood and affect. Behavior is normal.   Nursing note and vitals reviewed. Plans:  As above. Please see Patient Instructions for further counseling and information given. Advised to please be adherent to the treatment plans discussed today, and please call with any questions or concerns, letting the office know of any reasons that the plans may not be followed. The risks of untreated conditions include worsening illness, injury, disability, and possibly, death. Please call if symptoms change in any way, worsen, or fail to completely resolve, as this could necessitate a change to treatment plans. Patient and/or caregiver expressed understanding. Indications and proper use of medication(s) reviewed. Potential side-effects and risks of medication(s) also explained. Patient and/or caregiver was instructed to call if any new symptoms develop prior to next visit. Health risk factors discussed and addressed.

## 2022-04-06 LAB
HEPATITIS C ANTIBODY INTERPRETATION: NORMAL
HIV-1 AND HIV-2 ANTIBODIES: NORMAL

## 2022-04-07 LAB — URINE CULTURE, ROUTINE: NORMAL

## 2022-04-08 DIAGNOSIS — Z20.2 STD EXPOSURE: ICD-10-CM

## 2022-04-08 LAB
C. TRACHOMATIS DNA ,URINE: NEGATIVE
N. GONORRHOEAE DNA, URINE: NEGATIVE
SOURCE: NORMAL

## 2022-04-08 NOTE — TELEPHONE ENCOUNTER
Last Appointment:  4/5/2022  No future appointments[facility]

## 2022-04-11 RX ORDER — VALACYCLOVIR HYDROCHLORIDE 1 G/1
1000 TABLET, FILM COATED ORAL DAILY
Qty: 5 TABLET | Refills: 3 | Status: SHIPPED | OUTPATIENT
Start: 2022-04-11 | End: 2022-04-16

## 2022-04-25 LAB — NONINV COLON CA DNA+OCC BLD SCRN STL QL: NORMAL

## 2022-05-17 ENCOUNTER — TELEPHONE (OUTPATIENT)
Dept: FAMILY MEDICINE CLINIC | Age: 52
End: 2022-05-17

## 2022-05-17 NOTE — TELEPHONE ENCOUNTER
----- Message from Billy Oakley sent at 5/10/2022 10:37 AM EDT -----  Subject: Message to Provider    QUESTIONS  Information for Provider? pt says second colon box never arrived from [address],   pt requests call back asap to know what to do next  ---------------------------------------------------------------------------  --------------  [address]  What is the best way for the office to contact you? Do not leave any   message, patient will call back for answer  Preferred Call Back Phone Number? [phone]  ---------------------------------------------------------------------------  --------------  SCRIPT ANSWERS  Relationship to Patient?  Self

## 2022-06-02 LAB — NONINV COLON CA DNA+OCC BLD SCRN STL QL: NORMAL

## 2022-07-01 LAB — NONINV COLON CA DNA+OCC BLD SCRN STL QL: NEGATIVE

## 2022-07-20 DIAGNOSIS — I10 ESSENTIAL HYPERTENSION: ICD-10-CM

## 2022-07-20 DIAGNOSIS — I10 HYPERTENSION, UNSPECIFIED TYPE: ICD-10-CM

## 2022-07-20 DIAGNOSIS — Z76.0 MEDICATION REFILL: ICD-10-CM

## 2022-07-20 RX ORDER — AMITRIPTYLINE HYDROCHLORIDE 10 MG/1
10 TABLET, FILM COATED ORAL NIGHTLY
Qty: 90 TABLET | Refills: 0 | Status: SHIPPED
Start: 2022-07-20 | End: 2022-08-16 | Stop reason: SDUPTHER

## 2022-07-20 RX ORDER — LISINOPRIL AND HYDROCHLOROTHIAZIDE 20; 12.5 MG/1; MG/1
1 TABLET ORAL DAILY
Qty: 90 TABLET | Refills: 0 | Status: SHIPPED
Start: 2022-07-20 | End: 2022-08-16 | Stop reason: SDUPTHER

## 2022-08-16 ENCOUNTER — OFFICE VISIT (OUTPATIENT)
Dept: FAMILY MEDICINE CLINIC | Age: 52
End: 2022-08-16
Payer: COMMERCIAL

## 2022-08-16 VITALS
HEIGHT: 68 IN | DIASTOLIC BLOOD PRESSURE: 81 MMHG | TEMPERATURE: 97.9 F | OXYGEN SATURATION: 100 % | HEART RATE: 93 BPM | SYSTOLIC BLOOD PRESSURE: 117 MMHG | WEIGHT: 168.06 LBS | BODY MASS INDEX: 25.47 KG/M2

## 2022-08-16 DIAGNOSIS — G89.29 CHRONIC LOW BACK PAIN, UNSPECIFIED BACK PAIN LATERALITY, UNSPECIFIED WHETHER SCIATICA PRESENT: ICD-10-CM

## 2022-08-16 DIAGNOSIS — I10 HYPERTENSION, UNSPECIFIED TYPE: ICD-10-CM

## 2022-08-16 DIAGNOSIS — M54.50 CHRONIC LOW BACK PAIN, UNSPECIFIED BACK PAIN LATERALITY, UNSPECIFIED WHETHER SCIATICA PRESENT: ICD-10-CM

## 2022-08-16 DIAGNOSIS — Z78.9 NEED FOR FOLLOW-UP BY SOCIAL WORKER: Primary | ICD-10-CM

## 2022-08-16 DIAGNOSIS — Z76.0 MEDICATION REFILL: ICD-10-CM

## 2022-08-16 PROCEDURE — G8427 DOCREV CUR MEDS BY ELIG CLIN: HCPCS

## 2022-08-16 PROCEDURE — 99213 OFFICE O/P EST LOW 20 MIN: CPT

## 2022-08-16 PROCEDURE — G8419 CALC BMI OUT NRM PARAM NOF/U: HCPCS

## 2022-08-16 PROCEDURE — 3017F COLORECTAL CA SCREEN DOC REV: CPT

## 2022-08-16 PROCEDURE — 99212 OFFICE O/P EST SF 10 MIN: CPT

## 2022-08-16 PROCEDURE — 1036F TOBACCO NON-USER: CPT

## 2022-08-16 RX ORDER — PANTOPRAZOLE SODIUM 40 MG/1
40 TABLET, DELAYED RELEASE ORAL
Qty: 30 TABLET | Refills: 3 | Status: SHIPPED | OUTPATIENT
Start: 2022-08-16 | End: 2022-12-14

## 2022-08-16 RX ORDER — GABAPENTIN 100 MG/1
100 CAPSULE ORAL 3 TIMES DAILY
Qty: 180 CAPSULE | Refills: 0 | Status: SHIPPED
Start: 2022-08-16 | End: 2022-10-17

## 2022-08-16 RX ORDER — LISINOPRIL AND HYDROCHLOROTHIAZIDE 20; 12.5 MG/1; MG/1
1 TABLET ORAL DAILY
Qty: 90 TABLET | Refills: 0 | Status: SHIPPED | OUTPATIENT
Start: 2022-08-16

## 2022-08-16 RX ORDER — AMITRIPTYLINE HYDROCHLORIDE 10 MG/1
10 TABLET, FILM COATED ORAL NIGHTLY
Qty: 90 TABLET | Refills: 0 | Status: SHIPPED | OUTPATIENT
Start: 2022-08-16

## 2022-08-16 SDOH — ECONOMIC STABILITY: FOOD INSECURITY: WITHIN THE PAST 12 MONTHS, THE FOOD YOU BOUGHT JUST DIDN'T LAST AND YOU DIDN'T HAVE MONEY TO GET MORE.: SOMETIMES TRUE

## 2022-08-16 SDOH — ECONOMIC STABILITY: FOOD INSECURITY: WITHIN THE PAST 12 MONTHS, YOU WORRIED THAT YOUR FOOD WOULD RUN OUT BEFORE YOU GOT MONEY TO BUY MORE.: SOMETIMES TRUE

## 2022-08-16 ASSESSMENT — PATIENT HEALTH QUESTIONNAIRE - PHQ9
1. LITTLE INTEREST OR PLEASURE IN DOING THINGS: 0
4. FEELING TIRED OR HAVING LITTLE ENERGY: 1
SUM OF ALL RESPONSES TO PHQ QUESTIONS 1-9: 9
SUM OF ALL RESPONSES TO PHQ QUESTIONS 1-9: 9
7. TROUBLE CONCENTRATING ON THINGS, SUCH AS READING THE NEWSPAPER OR WATCHING TELEVISION: 3
5. POOR APPETITE OR OVEREATING: 1
10. IF YOU CHECKED OFF ANY PROBLEMS, HOW DIFFICULT HAVE THESE PROBLEMS MADE IT FOR YOU TO DO YOUR WORK, TAKE CARE OF THINGS AT HOME, OR GET ALONG WITH OTHER PEOPLE: 2
SUM OF ALL RESPONSES TO PHQ QUESTIONS 1-9: 9
9. THOUGHTS THAT YOU WOULD BE BETTER OFF DEAD, OR OF HURTING YOURSELF: 0
6. FEELING BAD ABOUT YOURSELF - OR THAT YOU ARE A FAILURE OR HAVE LET YOURSELF OR YOUR FAMILY DOWN: 1
8. MOVING OR SPEAKING SO SLOWLY THAT OTHER PEOPLE COULD HAVE NOTICED. OR THE OPPOSITE, BEING SO FIGETY OR RESTLESS THAT YOU HAVE BEEN MOVING AROUND A LOT MORE THAN USUAL: 1
SUM OF ALL RESPONSES TO PHQ9 QUESTIONS 1 & 2: 1
2. FEELING DOWN, DEPRESSED OR HOPELESS: 1
SUM OF ALL RESPONSES TO PHQ QUESTIONS 1-9: 9
3. TROUBLE FALLING OR STAYING ASLEEP: 1

## 2022-08-16 ASSESSMENT — SOCIAL DETERMINANTS OF HEALTH (SDOH): HOW HARD IS IT FOR YOU TO PAY FOR THE VERY BASICS LIKE FOOD, HOUSING, MEDICAL CARE, AND HEATING?: SOMEWHAT HARD

## 2022-08-16 NOTE — PROGRESS NOTES
Attending Physician Statement    S:   Chief Complaint   Patient presents with    Established New Doctor    Hypertension      HTN, stable. Compliant with meds   Chronic low back pain with sciatica on R. Was seeing back specialist and received 3 back injections without relief. Previously referred to PMR but never followed for injections. No red flag symptoms   Chronic GERD. OTC antacids help  O: Blood pressure 117/81, pulse 93, temperature 97.9 °F (36.6 °C), temperature source Temporal, height 5' 8\" (1.727 m), weight 168 lb 1 oz (76.2 kg), SpO2 100 %. Exam:   Heart - RRR   Lungs - clear   Abdomen - soft, nontender   Back - positive bilateral SLR, R > L    Tenderness midline lumbar tenderness, no paraspinal tenderness. Strength intact, sensory intact. Antalgic gait. Pain with flexion  A: As above  P:  Refer back to PMR   Trial of gabapentin   Protonix for GERD   Follow-up as ordered    I have discussed the case, including pertinent history and exam findings with the resident. I agree with the documented assessment and plan.     Corrie Villalba MD

## 2022-08-16 NOTE — PROGRESS NOTES
[facility]  FAMILY MEDICINE RESIDENCY PROGRAM  DATE OF VISIT : 2022    Patient : Jenn Bain   Age : 46 y.o.  : 1970   MRN : [id number]   ________________________________________________________________    Chief Complaint:   Chief Complaint   Patient presents with    Established New Doctor    Hypertension       HPI:   History obtained from the patient. Jenn Bain is a 46 y.o. male here for new to provider. PMH of Hypertension on Lisinopril Hydorchlorotizade 20-12.5. Has been compliant with meds. Bp today controlled, denies chest pain, palpitations, dizzines blurry vision. Chronic bilateral low back pain with right sided sciatica- on Elavil 10mg. Presents as right leg pain/ low back pain. and is described as numbness and tingling. . States he has seen a specialist in [place] and has received 3 back injections but not sure what (allergic to steroids). Been under the care of PM&R but has canceled or no showed appointments. Currently on disability   Denies red flag symptoms   No bowel bladder incontinence, saddle anesthesia. Also Gastric reflux. States that its chronic for at least 10 years , burning sensations after meals. No vomiting nausea diarrhea. OTC antacids provide relief . Alcohol reports binge drinking with at least 1 bottle of vidka at least 5-10 years  No smoke, no drugs. I reviewed the patient's past medications, allergies, past medical history, past surgical history, family history and social history during this visit      ROS:  Pertinent positives and negatives are stated within HPI    Physical Exam:    Vitals: /81   Pulse 93   Temp 97.9 °F (36.6 °C) (Temporal)   Ht 5' 8\" (1.727 m)   Wt 168 lb 1 oz (76.2 kg)   SpO2 100%   BMI 25.55 kg/m²   Physical Exam  Constitutional:       General: He is not in acute distress. Appearance: Normal appearance. HENT:      Head: Normocephalic and atraumatic.       Right Ear: External ear normal.      Left Ear: External ear normal.      Nose: Nose normal. No congestion. Mouth/Throat:      Mouth: Mucous membranes are moist.      Pharynx: Oropharynx is clear. Eyes:      General:         Right eye: No discharge. Left eye: No discharge. Extraocular Movements: Extraocular movements intact. Pupils: Pupils are equal, round, and reactive to light. Cardiovascular:      Rate and Rhythm: Normal rate and regular rhythm. Pulses: Normal pulses. Heart sounds: No murmur heard. No gallop. Pulmonary:      Effort: Pulmonary effort is normal.      Breath sounds: Normal breath sounds. No wheezing, rhonchi or rales. Abdominal:      General: Bowel sounds are normal. There is no distension. Palpations: Abdomen is soft. Tenderness: There is no abdominal tenderness. Musculoskeletal:      Cervical back: Normal range of motion and neck supple. Comments: Straight leg raise test positive bilateral but more on the right side. Tenderness to palpation midline lumbar. Strength and sensitivity normal bilateral lower extremities. Lymphadenopathy:      Cervical: No cervical adenopathy. Skin:     General: Skin is warm. Capillary Refill: Capillary refill takes less than 2 seconds. Coloration: Skin is not jaundiced. Findings: No bruising. Neurological:      General: No focal deficit present. Mental Status: He is alert and oriented to person, place, and time. Mental status is at baseline. Psychiatric:         Mood and Affect: Mood normal.         Behavior: Behavior normal.         Thought Content: Thought content normal.       Assessment & Plan:  Hypertension, unspecified type  Stable, Continue current medications. No side effects reported. - lisinopril-hydroCHLOROthiazide (PRINZIDE;ZESTORETIC) 20-12.5 MG per tablet; Take 1 tablet by mouth in the morning. Dispense: 90 tablet; Refill: 0      Chronic low back pain with R sciatica.   Chronic condition. Currently no red flag symptoms. Patient was being seen previously by PMR but lost to follow-up. Will place referral back to PMR and start trial of gabapentin. Will hold on placing physical therapy for further recommendation/evaluation by PMR    GERD. Will start trial of Protonix. Follow-up for resolution of symptoms. Need for follow-up by   Financial concerns. Requesting social work aid.  - [facility] Referral to Social Work (Primary Care Only)      Educational materials printed for patient's review and were included in patient instructions on his/her After Visit Summary and given to patient at the end of visit. Counseled regarding above diagnosis, including possible risks and complications,  especially if left uncontrolled. Counseled regarding the possible side effects, risks, benefits and alternatives to treatment. Call or go to ED immediately if symptoms worsen or persist. Indications and proper use of medication(s) reviewed. Potential side-effects and risks of medication(s) also explained. Reviewed age and gender appropriate health screening exams and vaccinations. Advised patient regarding importance of keeping up with recommended health maintenance and to schedule as soon as possible if overdue, as this is important in assessing for undiagnosed pathology, especially cancer, as well as protecting against potentially harmful/life threatening disease. Patient and/or guardian verbalizes understanding and agrees with above counseling, assessment and plan. All questions answered. Return to Office: Return in about 3 months (around 11/16/2022). Paras Chung MD   This case was discussed with Dr. Perlita Cabello.

## 2022-08-17 ENCOUNTER — TELEPHONE (OUTPATIENT)
Dept: FAMILY MEDICINE CLINIC | Age: 52
End: 2022-08-17

## 2022-08-19 NOTE — ED TRIAGE NOTES
FIRST PROVIDER CONTACT ASSESSMENT NOTE      Department of Emergency Medicine   ED  First Provider Note   2/19/21  4:32 PM EST    Chief Complaint: Rectal Bleeding (pt complaining of rectal bleeding beginning 2 hours ago c/o 10/10 abdominal pain)      History of Present Illness:    Maite Kauffman is a 48 y.o. male who presents to the ED by private car for post procedure bleeding after having a thrombosed hemorrhoid excised this morning in ED. Focused Screening Exam:  Constitutional:  Alert, appears stated age and is in no distress. Respirations easy nonlabored. Patient does have a bright red blood soaking through his pants and running down his legs.     *ALLERGIES*     Medrol [methylprednisolone]     ED Triage Vitals   BP Temp Temp Source Pulse Resp SpO2 Height Weight   02/19/21 1627 02/19/21 1613 02/19/21 1613 02/19/21 1613 02/19/21 1613 02/19/21 1613 02/19/21 1627 02/19/21 1627   136/81 98 °F (36.7 °C) Infrared 109 18 98 % 5' 8\" (1.727 m) 178 lb (80.7 kg)        Initial Plan of Care:  Initiate Treatment-Testing, Proceed toTreatment Area When Bed Available for ED Attending/MLP to Continue Care    -----------------END OF FIRST PROVIDER CONTACT ASSESSMENT NOTE--------------  Electronically signed by HONG Julian CNP   DD: 2/19/21 N/A

## 2022-10-17 RX ORDER — GABAPENTIN 100 MG/1
CAPSULE ORAL
Qty: 180 CAPSULE | Refills: 1 | Status: SHIPPED | OUTPATIENT
Start: 2022-10-17 | End: 2022-11-16

## 2022-11-23 ENCOUNTER — OFFICE VISIT (OUTPATIENT)
Dept: FAMILY MEDICINE CLINIC | Age: 52
End: 2022-11-23
Payer: COMMERCIAL

## 2022-11-23 VITALS
DIASTOLIC BLOOD PRESSURE: 84 MMHG | HEIGHT: 69 IN | BODY MASS INDEX: 26.09 KG/M2 | TEMPERATURE: 97.2 F | HEART RATE: 94 BPM | WEIGHT: 176.13 LBS | SYSTOLIC BLOOD PRESSURE: 132 MMHG | OXYGEN SATURATION: 98 %

## 2022-11-23 DIAGNOSIS — I10 ESSENTIAL HYPERTENSION: ICD-10-CM

## 2022-11-23 DIAGNOSIS — F32.1 CURRENT MODERATE EPISODE OF MAJOR DEPRESSIVE DISORDER, UNSPECIFIED WHETHER RECURRENT (HCC): Primary | ICD-10-CM

## 2022-11-23 DIAGNOSIS — Z76.0 MEDICATION REFILL: ICD-10-CM

## 2022-11-23 DIAGNOSIS — I10 HYPERTENSION, UNSPECIFIED TYPE: ICD-10-CM

## 2022-11-23 PROCEDURE — G8427 DOCREV CUR MEDS BY ELIG CLIN: HCPCS

## 2022-11-23 PROCEDURE — 1036F TOBACCO NON-USER: CPT

## 2022-11-23 PROCEDURE — G8482 FLU IMMUNIZE ORDER/ADMIN: HCPCS

## 2022-11-23 PROCEDURE — 3017F COLORECTAL CA SCREEN DOC REV: CPT

## 2022-11-23 PROCEDURE — G8419 CALC BMI OUT NRM PARAM NOF/U: HCPCS

## 2022-11-23 PROCEDURE — 3074F SYST BP LT 130 MM HG: CPT

## 2022-11-23 PROCEDURE — 3078F DIAST BP <80 MM HG: CPT

## 2022-11-23 PROCEDURE — 99213 OFFICE O/P EST LOW 20 MIN: CPT

## 2022-11-23 RX ORDER — PANTOPRAZOLE SODIUM 40 MG/1
40 TABLET, DELAYED RELEASE ORAL
Qty: 30 TABLET | Refills: 3 | Status: SHIPPED | OUTPATIENT
Start: 2022-11-23 | End: 2023-03-23

## 2022-11-23 RX ORDER — LISINOPRIL AND HYDROCHLOROTHIAZIDE 20; 12.5 MG/1; MG/1
1 TABLET ORAL DAILY
Qty: 90 TABLET | Refills: 0 | Status: SHIPPED | OUTPATIENT
Start: 2022-11-23

## 2022-11-23 RX ORDER — LISINOPRIL 20 MG/1
TABLET ORAL
Qty: 90 TABLET | Refills: 0 | Status: SHIPPED
Start: 2022-11-23 | End: 2022-11-23 | Stop reason: ALTCHOICE

## 2022-11-23 RX ORDER — GABAPENTIN 100 MG/1
CAPSULE ORAL
Qty: 180 CAPSULE | Refills: 1 | Status: CANCELLED | OUTPATIENT
Start: 2022-11-23 | End: 2022-12-23

## 2022-11-23 RX ORDER — AMITRIPTYLINE HYDROCHLORIDE 10 MG/1
10 TABLET, FILM COATED ORAL NIGHTLY
Qty: 90 TABLET | Refills: 0 | Status: SHIPPED | OUTPATIENT
Start: 2022-11-23

## 2022-11-23 RX ORDER — GABAPENTIN 300 MG/1
300 CAPSULE ORAL 2 TIMES DAILY
Qty: 180 CAPSULE | Refills: 0 | Status: SHIPPED | OUTPATIENT
Start: 2022-11-23 | End: 2023-02-21

## 2022-11-23 NOTE — PROGRESS NOTES
S: 46 y.o. male follow up HTN, chronic low back pain and depression. BP is fairly well controlled. Tolerating meds. Previously referred to PMR at last appt. He notes that he has not yet made appt. He is on gabapentin and this is not helping. He is on elavil as well. No AE. No red flag symptoms. New onset depressed mood. No SI/HI. He would like referral to counseling and okay with starting meds. He agrees to influenza vaccination. O: VS: BP (!) 148/98   Pulse 94   Temp 97.2 °F (36.2 °C) (Temporal)   Ht 5' 9\" (1.753 m)   Wt 176 lb 2 oz (79.9 kg)   SpO2 98%   BMI 26.01 kg/m²    General: NAD, appropriate affect and grooming   CV:  RRR, no gallops, rubs, or murmurs   Resp: CTAB   Ext:  No edema   Neuro:  strength 5/5, normal sensation  Impression/Plan:   Primary HTN-stable; continue current medication  Chronic back pain-persistent; patient to make appt with PMR; increase gabapentin to 300 mg BID. Continue elavil 10 mg at bedtime. Depressed mood-will try zoloft 25 mg daily with increase after week 1 to 50 mg daily. 4. HM-influenza vaccine. Attending Physician Statement  I have discussed the case, including pertinent history and exam findings with the resident. I agree with the documented assessment and plan.

## 2022-11-23 NOTE — PROGRESS NOTES
[facility]  FAMILY MEDICINE RESIDENCY PROGRAM  DATE OF VISIT : 2022    Patient : Lina Bliss   Age : 46 y.o.  : 1970   MRN : [id number]   ________________________________________________________________    Chief Complaint:   Chief Complaint   Patient presents with    Follow-up    Other     Some medication issues. HPI:   History obtained from the patient. Lina Bliss is a 46 y.o. male here for   Follow up on:    Hypertension   Taking lisinopril Hydorchlorotizade 20-12.5. Has been compliant with meds. Bp today controlled, denies chest pain, palpitations, dizzines blurry vision. Chronic bilateral low back pain with right sided sciatica- on Elavil 10mg and gabapentin 100 mg twice daily. States his gabapentin has really not provide too much control of his pain. Presents as right leg pain/ low back pain. and is described as numbness and tingling. .  Please refer to PMR but was not able to set up appointment as he lost  phone number. Requesting phone number to set up the appointment. Currently on disability   Denies red flag symptoms   No bowel bladder incontinence, saddle anesthesia. Depression-reports sleeping problems, no eating too much, difficulty concentrating, fatigue, anhedonia. No SI/HI. I reviewed the patient's past medications, allergies, past medical history, past surgical history, family history and social history during this visit      ROS:  Pertinent positives and negatives are stated within HPI    Physical Exam:    Vitals: /84   Pulse 94   Temp 97.2 °F (36.2 °C) (Temporal)   Ht 5' 9\" (1.753 m)   Wt 176 lb 2 oz (79.9 kg)   SpO2 98%   BMI 26.01 kg/m²   Physical Exam  Constitutional:       General: He is not in acute distress. Appearance: Normal appearance. Cardiovascular:      Rate and Rhythm: Normal rate and regular rhythm. Pulses: Normal pulses. Heart sounds: No murmur heard. No gallop.    Pulmonary: Effort: Pulmonary effort is normal.      Breath sounds: Normal breath sounds. No wheezing, rhonchi or rales. Abdominal:      General: Bowel sounds are normal. There is no distension. Palpations: Abdomen is soft. Tenderness: There is no abdominal tenderness. Musculoskeletal:      Cervical back: Normal range of motion and neck supple. Comments: Straight leg raise test positive bilateral but more on the right side. Tenderness to palpation midline lumbar. Strength and sensitivity normal bilateral lower extremities. Neurological:      General: No focal deficit present. Mental Status: He is alert and oriented to person, place, and time. Mental status is at baseline. Psychiatric:         Mood and Affect: sad, depressed     Assessment & Plan:  Hypertension, unspecified type  Stable, Continue current medications. Chronic low back pain with R sciatica. Reffered to PMR in the past.  Lost phone number and requesting phone number again so he can set up the appointment. Currently on Elavil 10 mg and gabapentin 20 mg twice daily. Gabapentin did not provide significant relief we will increase it to 300 mg bid. Depression  Will start Zoloft 50 mg and titrate as in tolerated. Also provided patient with number of local counselors. She will follow-up with them and if is not able to set up appointment he will notify me and/or return to clinic for further evaluation and management. We will also instruct patient to return in 1 or 2 months to clinic for close monitoring. HM  Flu vaccine      Educational materials printed for patient's review and were included in patient instructions on his/her After Visit Summary and given to patient at the end of visit. Counseled regarding above diagnosis, including possible risks and complications,  especially if left uncontrolled. Counseled regarding the possible side effects, risks, benefits and alternatives to treatment.      Call or go to ED immediately if symptoms worsen or persist. Indications and proper use of medication(s) reviewed. Potential side-effects and risks of medication(s) also explained. Reviewed age and gender appropriate health screening exams and vaccinations. Advised patient regarding importance of keeping up with recommended health maintenance and to schedule as soon as possible if overdue, as this is important in assessing for undiagnosed pathology, especially cancer, as well as protecting against potentially harmful/life threatening disease. Patient and/or guardian verbalizes understanding and agrees with above counseling, assessment and plan. All questions answered. Return to Office: Return in about 2 months (around 1/23/2023) for Fu on depresion.     Josey Epstein MD   This case was discussed with Dr. Madeleine Melo

## 2022-11-25 PROBLEM — F32.1 CURRENT MODERATE EPISODE OF MAJOR DEPRESSIVE DISORDER (HCC): Status: ACTIVE | Noted: 2022-11-25

## 2022-12-19 ENCOUNTER — TELEPHONE (OUTPATIENT)
Dept: FAMILY MEDICINE CLINIC | Age: 52
End: 2022-12-19

## 2022-12-19 NOTE — TELEPHONE ENCOUNTER
Patient called in stating that his medications form November where not at his pharmacy. I called the pharmacy. Per pharmacist  patient has not picked up any of his medications in October, or November that were sent. He needs to call in and let them know what he wants and they will fill and get the medications ready. I tried to call patient back but phone rings busy.

## 2023-01-01 ENCOUNTER — PREP FOR PROCEDURE (OUTPATIENT)
Dept: SURGERY | Age: 53
End: 2023-01-01

## 2023-01-01 DIAGNOSIS — M79.89 MASS OF SOFT TISSUE: ICD-10-CM

## 2023-01-01 DIAGNOSIS — I10 HYPERTENSION, UNSPECIFIED TYPE: Primary | ICD-10-CM

## 2023-05-22 ENCOUNTER — OFFICE VISIT (OUTPATIENT)
Dept: FAMILY MEDICINE CLINIC | Age: 53
End: 2023-05-22

## 2023-05-22 VITALS
OXYGEN SATURATION: 98 % | TEMPERATURE: 97.3 F | DIASTOLIC BLOOD PRESSURE: 84 MMHG | HEART RATE: 96 BPM | SYSTOLIC BLOOD PRESSURE: 140 MMHG

## 2023-05-22 DIAGNOSIS — Z20.2 EXPOSURE TO SEXUALLY TRANSMITTED DISEASE (STD): ICD-10-CM

## 2023-05-22 DIAGNOSIS — Z76.0 MEDICATION REFILL: ICD-10-CM

## 2023-05-22 DIAGNOSIS — I10 HYPERTENSION, UNSPECIFIED TYPE: ICD-10-CM

## 2023-05-22 DIAGNOSIS — Z00.00 ENCOUNTER FOR WELL ADULT EXAM WITHOUT ABNORMAL FINDINGS: Primary | ICD-10-CM

## 2023-05-22 DIAGNOSIS — M54.50 LOW BACK PAIN, UNSPECIFIED BACK PAIN LATERALITY, UNSPECIFIED CHRONICITY, UNSPECIFIED WHETHER SCIATICA PRESENT: ICD-10-CM

## 2023-05-22 LAB
ANION GAP SERPL CALCULATED.3IONS-SCNC: 12 MMOL/L (ref 7–16)
BILIRUB UR QL STRIP: NEGATIVE
BUN SERPL-MCNC: 13 MG/DL (ref 6–20)
CALCIUM SERPL-MCNC: 9.8 MG/DL (ref 8.6–10.2)
CHLORIDE SERPL-SCNC: 101 MMOL/L (ref 98–107)
CLARITY UR: CLEAR
CO2 SERPL-SCNC: 24 MMOL/L (ref 22–29)
COLOR UR: YELLOW
CREAT SERPL-MCNC: 1.1 MG/DL (ref 0.7–1.2)
GLUCOSE SERPL-MCNC: 76 MG/DL (ref 74–99)
GLUCOSE UR STRIP-MCNC: NEGATIVE MG/DL
HGB UR QL STRIP: NEGATIVE
KETONES UR STRIP-MCNC: NEGATIVE MG/DL
LEUKOCYTE ESTERASE UR QL STRIP: NEGATIVE
NITRITE UR QL STRIP: NEGATIVE
PH UR STRIP: 6.5 [PH] (ref 5–9)
POTASSIUM SERPL-SCNC: 4 MMOL/L (ref 3.5–5)
PROT UR STRIP-MCNC: NEGATIVE MG/DL
SODIUM SERPL-SCNC: 137 MMOL/L (ref 132–146)
SP GR UR STRIP: 1.01 (ref 1–1.03)
SPECIMEN SOURCE: NORMAL
UROBILINOGEN UR STRIP-ACNC: 2 E.U./DL

## 2023-05-22 RX ORDER — LISINOPRIL AND HYDROCHLOROTHIAZIDE 25; 20 MG/1; MG/1
1 TABLET ORAL DAILY
Qty: 30 TABLET | Refills: 5 | Status: SHIPPED | OUTPATIENT
Start: 2023-05-22

## 2023-05-22 RX ORDER — NAPROXEN 500 MG/1
500 TABLET ORAL 2 TIMES DAILY WITH MEALS
Qty: 28 TABLET | Refills: 0 | Status: SHIPPED | OUTPATIENT
Start: 2023-05-22 | End: 2023-06-05

## 2023-05-22 RX ORDER — AMITRIPTYLINE HYDROCHLORIDE 25 MG/1
25 TABLET, FILM COATED ORAL NIGHTLY
Qty: 90 TABLET | Refills: 0 | Status: SHIPPED | OUTPATIENT
Start: 2023-05-22 | End: 2023-08-20

## 2023-05-22 SDOH — ECONOMIC STABILITY: HOUSING INSECURITY
IN THE LAST 12 MONTHS, WAS THERE A TIME WHEN YOU DID NOT HAVE A STEADY PLACE TO SLEEP OR SLEPT IN A SHELTER (INCLUDING NOW)?: NO

## 2023-05-22 SDOH — ECONOMIC STABILITY: FOOD INSECURITY: WITHIN THE PAST 12 MONTHS, YOU WORRIED THAT YOUR FOOD WOULD RUN OUT BEFORE YOU GOT MONEY TO BUY MORE.: SOMETIMES TRUE

## 2023-05-22 SDOH — ECONOMIC STABILITY: INCOME INSECURITY: HOW HARD IS IT FOR YOU TO PAY FOR THE VERY BASICS LIKE FOOD, HOUSING, MEDICAL CARE, AND HEATING?: HARD

## 2023-05-22 SDOH — ECONOMIC STABILITY: FOOD INSECURITY: WITHIN THE PAST 12 MONTHS, THE FOOD YOU BOUGHT JUST DIDN'T LAST AND YOU DIDN'T HAVE MONEY TO GET MORE.: SOMETIMES TRUE

## 2023-05-22 ASSESSMENT — PATIENT HEALTH QUESTIONNAIRE - PHQ9
7. TROUBLE CONCENTRATING ON THINGS, SUCH AS READING THE NEWSPAPER OR WATCHING TELEVISION: 1
10. IF YOU CHECKED OFF ANY PROBLEMS, HOW DIFFICULT HAVE THESE PROBLEMS MADE IT FOR YOU TO DO YOUR WORK, TAKE CARE OF THINGS AT HOME, OR GET ALONG WITH OTHER PEOPLE: 1
SUM OF ALL RESPONSES TO PHQ QUESTIONS 1-9: 5
SUM OF ALL RESPONSES TO PHQ QUESTIONS 1-9: 5
5. POOR APPETITE OR OVEREATING: 0
3. TROUBLE FALLING OR STAYING ASLEEP: 1
9. THOUGHTS THAT YOU WOULD BE BETTER OFF DEAD, OR OF HURTING YOURSELF: 0
SUM OF ALL RESPONSES TO PHQ QUESTIONS 1-9: 5
8. MOVING OR SPEAKING SO SLOWLY THAT OTHER PEOPLE COULD HAVE NOTICED. OR THE OPPOSITE, BEING SO FIGETY OR RESTLESS THAT YOU HAVE BEEN MOVING AROUND A LOT MORE THAN USUAL: 2
2. FEELING DOWN, DEPRESSED OR HOPELESS: 1
6. FEELING BAD ABOUT YOURSELF - OR THAT YOU ARE A FAILURE OR HAVE LET YOURSELF OR YOUR FAMILY DOWN: 0
SUM OF ALL RESPONSES TO PHQ QUESTIONS 1-9: 5
4. FEELING TIRED OR HAVING LITTLE ENERGY: 0

## 2023-05-22 ASSESSMENT — LIFESTYLE VARIABLES
HOW MANY STANDARD DRINKS CONTAINING ALCOHOL DO YOU HAVE ON A TYPICAL DAY: 3 OR 4
HOW OFTEN DO YOU HAVE A DRINK CONTAINING ALCOHOL: 2-3 TIMES A WEEK

## 2023-05-22 NOTE — PROGRESS NOTES
S: 46 y.o. male here for HTN, depression. HTN not well controlled. Depression. No SI or HI. Quick to anger. Partner tested pos for STD. No sxs aside from tingling in his penis. Chronic LBP w/ R sciatica. Elavil and gabapentin. Sxs not well controlled. On disability. RLE pain, numbness/tingling. TIM 5 yrs ago, wants again. O: VS: BP (!) 140/84   Pulse 96   Temp 97.3 °F (36.3 °C) (Temporal)   SpO2 98%    General: NAD, alert and interacting appropriately. CV:  RRR, no gallops, rubs, or murmurs    Resp: CTAB   Abd:  Soft, nontender   Ext:  No edema   SLR pos on R. FROM of lumbar spine. Impression: HTN. Depression. Plan:   Increase lisinopril-hctz. Increase elavil  RPR, HIV, Hep C and B, GCCT, trich  Rtc 1 mo for HTN    Attending Physician Statement  I have discussed the case, including pertinent history and exam findings with the resident. I agree with the documented assessment and plan.

## 2023-05-22 NOTE — PATIENT INSTRUCTIONS
FOOD ASSISTANCE   Help Network of [address] [place] [address]  Text the words Lizzy Lanier to : 583452   At the prompt eric 1 for information about food pantries or 2 to learn about free meal sites   https://talavera.net/. org/  Benjie and Michela [address] [facility] [address] [place] [address] [address] [facility], [address] [place] [address] [phone]  [facility]   [address] [facility], [address] [facility]  Hours: Mon-Sat 6am-1:30PM Tacho Bishop Closed  Phone: [phone]    [facility] [place]  Prema Layton [address] [address] [facility], [place], [facility]   [phone]  Two full meals are served daily to the public at our Emergency [place] located at [address] [facility]. [facility]. in [place]. Breakfast: 6:15 a.m.-7:15 a.m. Dinner: 6:00 p.m.-7:00 p.m. All are welcome. No proof of income or ID is required. [facility] on Wheels [facility] [address] , [place], [address] [facility] [place]   Phone: [phone]   Hot meal and sack lunch (for evening meal) Monday-Friday to residents of [facility]. Cost for weekly meals, discounted for two people. [address] [employer] [place] [address]   Phone: [phone]   Meal delivery options for residents of [facility], [place], [place], [place], [facility], [facility], [facility], [facility], [place], [place], [place], [facility] [place], [facility]. Delivered Monday-Friday. Frozen meals for Saturday and Sunday delivered on Friday. Cost for regular and special diet meals. Jan Laurent , [place], [address]   Phone: [phone]   Delivers hot meals to homebound individuals living in the [address]. Monday-Friday. If eligible and funds available, two frozen meals for Saturday and Sunday.      Office of Elderly Affairs   [address]   Phone:

## 2023-05-22 NOTE — PROGRESS NOTES
Detail Level: Detailed Office visit  Name: Bhavna An Date: 2023   MRN: [id number] Sex: Male   Age: 46 y.o. Ethnicity: Non- / Non    : 1970 Race: Mohit Christa / African American      Farzad Robin is here follow-up on chronic conditions    Hypertension  Blood pressure currently not well controlled. 147/89 at the office. Currently taking lisinopril 20 and hydrochlorothiazide 12.5 mg once a day. Does not check blood pressure at home. Takes med as described. Rare missed doses. No CP/SOB/COWAN/Leg Cramping w/ Exertion/LE Edema. No side effects. Depression   Currently on Zoloft 50 mgs treatments. Quick to anger. PHQ-9 score of 5 no suicidal ideation homicidal intent. Will like to follow-up with a psychiatrist.    Concern for STD. Partner was positive for unknown STD. Was prescribed pills that he does not know the name of. States that he has been having tingling in his penis for the past 2-3 weeks. No hematuria, dysuria, urinary incontinence, Urinary frequency, abnormal discharge. Chronic bilateral low back pain with right sided sciatica  Currently on Elavil 10mg and gabapentin 300 mg twice daily. States his gabapentin has really not provide too much control of his pain. Presents as right leg pain/ low back pain. and is described as numbness and tingling. Has been referred to PMR in the past but has not been able to follow-up with them. Agreeable to try to see them again. Currently on disability   Denies red flag symptoms   No bowel bladder incontinence, saddle anesthesia. Allergies   Allergen Reactions    Medrol [Methylprednisolone] Diarrhea and Other (See Comments)     Severe GI upset, bloody stool, dry skin, shakiness         Prior to Visit Medications    Medication Sig Taking?  Authorizing Provider   amitriptyline (ELAVIL) 25 MG tablet Take 1 tablet by mouth nightly Yes Jaye Jacobsen MD   naproxen (NAPROSYN) 500 MG tablet Take 1 tablet by mouth 2 times daily (with Detail Level: Generalized Detail Level: Zone

## 2023-05-23 LAB — RPR SER QL: NORMAL

## 2023-05-25 LAB
CHLAMYDIA DNA UR QL NAA+PROBE: NEGATIVE
HBV SURFACE AG SERPL QL IA: NORMAL
HCV AB SERPL QL IA: NORMAL
HIV1+2 AB SERPL QL IA: NORMAL
N GONORRHOEA DNA UR QL NAA+PROBE: NEGATIVE
SPECIMEN SOURCE: NORMAL

## 2023-06-26 DIAGNOSIS — Z76.0 MEDICATION REFILL: ICD-10-CM

## 2023-06-27 RX ORDER — AMITRIPTYLINE HYDROCHLORIDE 25 MG/1
25 TABLET, FILM COATED ORAL NIGHTLY
Qty: 90 TABLET | Refills: 0 | Status: SHIPPED | OUTPATIENT
Start: 2023-06-27 | End: 2023-09-25

## 2023-06-27 RX ORDER — LISINOPRIL AND HYDROCHLOROTHIAZIDE 25; 20 MG/1; MG/1
1 TABLET ORAL DAILY
Qty: 30 TABLET | Refills: 5 | Status: SHIPPED | OUTPATIENT
Start: 2023-06-27

## 2023-06-27 RX ORDER — GABAPENTIN 300 MG/1
300 CAPSULE ORAL 2 TIMES DAILY
Qty: 180 CAPSULE | Refills: 0 | Status: SHIPPED | OUTPATIENT
Start: 2023-06-27 | End: 2023-09-25

## 2023-06-27 RX ORDER — NAPROXEN 500 MG/1
500 TABLET ORAL 2 TIMES DAILY WITH MEALS
Qty: 28 TABLET | Refills: 0 | Status: SHIPPED | OUTPATIENT
Start: 2023-06-27 | End: 2023-07-11

## 2023-06-27 RX ORDER — PANTOPRAZOLE SODIUM 40 MG/1
40 TABLET, DELAYED RELEASE ORAL
Qty: 30 TABLET | Refills: 3 | Status: SHIPPED | OUTPATIENT
Start: 2023-06-27 | End: 2023-10-25

## 2023-08-11 ENCOUNTER — OFFICE VISIT (OUTPATIENT)
Dept: FAMILY MEDICINE CLINIC | Age: 53
End: 2023-08-11
Payer: COMMERCIAL

## 2023-08-11 VITALS
HEIGHT: 69 IN | RESPIRATION RATE: 18 BRPM | TEMPERATURE: 97.1 F | DIASTOLIC BLOOD PRESSURE: 111 MMHG | WEIGHT: 185 LBS | HEART RATE: 97 BPM | OXYGEN SATURATION: 96 % | SYSTOLIC BLOOD PRESSURE: 170 MMHG | BODY MASS INDEX: 27.4 KG/M2

## 2023-08-11 DIAGNOSIS — I10 HYPERTENSION, UNSPECIFIED TYPE: ICD-10-CM

## 2023-08-11 DIAGNOSIS — D17.9 LIPOMA, UNSPECIFIED SITE: Primary | ICD-10-CM

## 2023-08-11 LAB
ANION GAP SERPL CALCULATED.3IONS-SCNC: 10 MMOL/L (ref 7–16)
BUN BLDV-MCNC: 18 MG/DL (ref 6–20)
CALCIUM SERPL-MCNC: 9 MG/DL (ref 8.6–10.2)
CHLORIDE BLD-SCNC: 105 MMOL/L (ref 98–107)
CO2: 26 MMOL/L (ref 22–29)
CREAT SERPL-MCNC: 1.4 MG/DL (ref 0.7–1.2)
GFR SERPL CREATININE-BSD FRML MDRD: >60 ML/MIN/1.73M2
GLUCOSE BLD-MCNC: 96 MG/DL (ref 74–99)
POTASSIUM SERPL-SCNC: 3.4 MMOL/L (ref 3.5–5)
SODIUM BLD-SCNC: 141 MMOL/L (ref 132–146)

## 2023-08-11 PROCEDURE — 36415 COLL VENOUS BLD VENIPUNCTURE: CPT | Performed by: FAMILY MEDICINE

## 2023-08-11 RX ORDER — AMLODIPINE BESYLATE 5 MG/1
5 TABLET ORAL DAILY
Qty: 30 TABLET | Refills: 3 | Status: SHIPPED | OUTPATIENT
Start: 2023-08-11

## 2023-08-11 RX ORDER — SERTRALINE HYDROCHLORIDE 100 MG/1
100 TABLET, FILM COATED ORAL DAILY
Qty: 30 TABLET | Refills: 3 | Status: SHIPPED | OUTPATIENT
Start: 2023-08-11

## 2023-08-11 RX ORDER — NAPROXEN 500 MG/1
TABLET ORAL
Qty: 28 TABLET | Refills: 0 | Status: SHIPPED | OUTPATIENT
Start: 2023-08-11

## 2023-08-11 NOTE — TELEPHONE ENCOUNTER
Last Appointment:  5/22/2023  Future Appointments  8/11/2023  2:00 PM    Beatrice Officer, MD [facility]        [facility]

## 2023-08-14 DIAGNOSIS — I10 HYPERTENSION, UNSPECIFIED TYPE: Primary | ICD-10-CM

## 2023-08-17 DIAGNOSIS — Z76.0 MEDICATION REFILL: ICD-10-CM

## 2023-08-17 NOTE — TELEPHONE ENCOUNTER
Last Appointment:  8/11/2023  Future Appointments  8/25/2023  2:20 PM    Chapito Karimi MD  [facility]  10/2/2023  1:30 PM    Joy Gurrola MD        [facility]        [facility]

## 2023-08-18 ENCOUNTER — NURSE ONLY (OUTPATIENT)
Dept: FAMILY MEDICINE CLINIC | Age: 53
End: 2023-08-18
Payer: COMMERCIAL

## 2023-08-18 DIAGNOSIS — I10 ESSENTIAL HYPERTENSION: Primary | ICD-10-CM

## 2023-08-18 DIAGNOSIS — I10 HYPERTENSION, UNSPECIFIED TYPE: ICD-10-CM

## 2023-08-18 LAB
ANION GAP SERPL CALCULATED.3IONS-SCNC: 11 MMOL/L (ref 7–16)
BUN BLDV-MCNC: 17 MG/DL (ref 6–20)
CALCIUM SERPL-MCNC: 8.7 MG/DL (ref 8.6–10.2)
CHLORIDE BLD-SCNC: 109 MMOL/L (ref 98–107)
CO2: 26 MMOL/L (ref 22–29)
CREAT SERPL-MCNC: 1.2 MG/DL (ref 0.7–1.2)
GFR SERPL CREATININE-BSD FRML MDRD: >60 ML/MIN/1.73M2
GLUCOSE BLD-MCNC: 88 MG/DL (ref 74–99)
POTASSIUM SERPL-SCNC: 4.2 MMOL/L (ref 3.5–5)
SODIUM BLD-SCNC: 146 MMOL/L (ref 132–146)

## 2023-08-18 PROCEDURE — 36415 COLL VENOUS BLD VENIPUNCTURE: CPT | Performed by: FAMILY MEDICINE

## 2023-08-18 RX ORDER — AMITRIPTYLINE HYDROCHLORIDE 25 MG/1
25 TABLET, FILM COATED ORAL NIGHTLY
Qty: 90 TABLET | Refills: 0 | Status: SHIPPED | OUTPATIENT
Start: 2023-08-18 | End: 2023-11-16

## 2023-08-30 ENCOUNTER — TELEPHONE (OUTPATIENT)
Dept: FAMILY MEDICINE CLINIC | Age: 53
End: 2023-08-30

## 2023-08-30 NOTE — TELEPHONE ENCOUNTER
Patient called states  that   he was told that Dr. Paul Lopes was going to send pain patches to his pharmacy.   Please advise

## 2023-09-01 RX ORDER — LIDOCAINE 50 MG/G
1 PATCH TOPICAL DAILY
Qty: 30 PATCH | Refills: 0 | Status: SHIPPED | OUTPATIENT
Start: 2023-09-01 | End: 2023-10-01

## 2023-09-14 ENCOUNTER — TELEPHONE (OUTPATIENT)
Dept: FAMILY MEDICINE CLINIC | Age: 53
End: 2023-09-14

## 2023-09-14 NOTE — TELEPHONE ENCOUNTER
----- Message from [address] [place] [address] J sent at 9/14/2023 12:22 PM EDT -----  Subject: Medication Problem    Medication: lidocaine (LIDODERM) 5 %  Dosage: Place 1 patch onto the skin daily 12 hours on, 12 hours off. Ordering Provider: Cindie Romberg    Question/Problem: Patient called on 9/1 and 9/2 and he said the pharmacy   did not have the order. Confirmed it was the right pharmacy and it was. Can you resend the script?       Pharmacy: [address] [place] [address] [phone]    ---------------------------------------------------------------------------  --------------  Ivy BUNCH  [phone]; OK to leave message on voicemail  ---------------------------------------------------------------------------  --------------    SCRIPT ANSWERS  Relationship to Patient: Self

## 2023-09-18 RX ORDER — LIDOCAINE 50 MG/G
1 PATCH TOPICAL DAILY
Qty: 10 PATCH | Refills: 0 | Status: SHIPPED
Start: 2023-09-18 | End: 2023-09-19

## 2023-09-19 ENCOUNTER — HOSPITAL ENCOUNTER (INPATIENT)
Age: 53
LOS: 10 days | Discharge: OTHER INSTITUTION | DRG: 469 | End: 2023-09-29
Attending: STUDENT IN AN ORGANIZED HEALTH CARE EDUCATION/TRAINING PROGRAM | Admitting: FAMILY MEDICINE
Payer: COMMERCIAL

## 2023-09-19 ENCOUNTER — APPOINTMENT (OUTPATIENT)
Dept: CT IMAGING | Age: 53
DRG: 469 | End: 2023-09-19
Payer: COMMERCIAL

## 2023-09-19 DIAGNOSIS — N17.9 ACUTE RENAL FAILURE, UNSPECIFIED ACUTE RENAL FAILURE TYPE (HCC): Primary | ICD-10-CM

## 2023-09-19 DIAGNOSIS — T79.6XXA TRAUMATIC RHABDOMYOLYSIS, INITIAL ENCOUNTER (HCC): ICD-10-CM

## 2023-09-19 DIAGNOSIS — R19.5 FECAL OCCULT BLOOD TEST POSITIVE: ICD-10-CM

## 2023-09-19 DIAGNOSIS — R23.8 BULLOUS LESION: ICD-10-CM

## 2023-09-19 LAB
ALBUMIN SERPL-MCNC: 3.5 G/DL (ref 3.5–5.2)
ALP SERPL-CCNC: 87 U/L (ref 40–129)
ALT SERPL-CCNC: 51 U/L (ref 0–40)
ANION GAP SERPL CALCULATED.3IONS-SCNC: 41 MMOL/L (ref 7–16)
APAP SERPL-MCNC: <5 UG/ML (ref 10–30)
AST SERPL-CCNC: 75 U/L (ref 0–39)
B.E.: -12.2 MMOL/L (ref -3–3)
BASOPHILS # BLD: 0 K/UL (ref 0–0.2)
BASOPHILS NFR BLD: 0 % (ref 0–2)
BILIRUB DIRECT SERPL-MCNC: <0.2 MG/DL (ref 0–0.3)
BILIRUB INDIRECT SERPL-MCNC: ABNORMAL MG/DL (ref 0–1)
BILIRUB SERPL-MCNC: 0.3 MG/DL (ref 0–1.2)
BUN SERPL-MCNC: 192 MG/DL (ref 6–20)
CALCIUM SERPL-MCNC: 7.5 MG/DL (ref 8.6–10.2)
CHLORIDE SERPL-SCNC: 77 MMOL/L (ref 98–107)
CK SERPL-CCNC: ABNORMAL U/L (ref 20–200)
CO2 SERPL-SCNC: 14 MMOL/L (ref 22–29)
COHB: 0.3 % (ref 0–1.5)
CORTIS SERPL-MCNC: 47.7 UG/DL (ref 2.7–18.4)
CORTISOL COLLECTION INFO: ABNORMAL
CREAT SERPL-MCNC: 14.9 MG/DL (ref 0.7–1.2)
CREAT UR-MCNC: 533 MG/DL (ref 40–278)
CRITICAL: ABNORMAL
DATE ANALYZED: ABNORMAL
DATE OF COLLECTION: ABNORMAL
EOSINOPHIL # BLD: 0 K/UL (ref 0.05–0.5)
EOSINOPHILS RELATIVE PERCENT: 0 % (ref 0–6)
ERYTHROCYTE [DISTWIDTH] IN BLOOD BY AUTOMATED COUNT: 14.9 % (ref 11.5–15)
ETHANOLAMINE SERPL-MCNC: <10 MG/DL
GFR SERPL CREATININE-BSD FRML MDRD: 4 ML/MIN/1.73M2
GLUCOSE SERPL-MCNC: 166 MG/DL (ref 74–99)
HCO3: 11.1 MMOL/L (ref 22–26)
HCT VFR BLD AUTO: 39.2 % (ref 37–54)
HGB BLD-MCNC: 13.9 G/DL (ref 12.5–16.5)
HHB: 1.7 % (ref 0–5)
INFLUENZA A BY PCR: NOT DETECTED
INFLUENZA B BY PCR: NOT DETECTED
INR PPP: 1.2
LAB: ABNORMAL
LACTATE BLDV-SCNC: 1.7 MMOL/L (ref 0.5–1.9)
LACTATE BLDV-SCNC: 3.2 MMOL/L (ref 0.5–2.2)
LIPASE SERPL-CCNC: 100 U/L (ref 13–60)
LYMPHOCYTES NFR BLD: 0.65 K/UL (ref 1.5–4)
LYMPHOCYTES RELATIVE PERCENT: 3 % (ref 20–42)
Lab: 1850
MAGNESIUM SERPL-MCNC: 2.4 MG/DL (ref 1.6–2.6)
MCH RBC QN AUTO: 29.4 PG (ref 26–35)
MCHC RBC AUTO-ENTMCNC: 35.5 G/DL (ref 32–34.5)
MCV RBC AUTO: 83.1 FL (ref 80–99.9)
METAMYELOCYTES ABSOLUTE COUNT: 0.65 K/UL (ref 0–0.12)
METAMYELOCYTES: 3 % (ref 0–1)
METHB: 0.3 % (ref 0–1.5)
MODE: ABNORMAL
MONOCYTES NFR BLD: 27 % (ref 2–12)
MONOCYTES NFR BLD: 5.22 K/UL (ref 0.1–0.95)
MYELOCYTES ABSOLUTE COUNT: 0.65 K/UL
MYELOCYTES: 3 %
NEUTROPHILS NFR BLD: 62 % (ref 43–80)
NEUTS SEG NFR BLD: 11.92 K/UL (ref 1.8–7.3)
O2 CONTENT: 18.3 ML/DL
O2 SATURATION: 98.3 % (ref 92–98.5)
O2HB: 97.7 % (ref 94–97)
OPERATOR ID: 421
OSMOLALITY UR: 359 MOSM/KG (ref 300–900)
PATIENT TEMP: 37 C
PCO2: 20.6 MMHG (ref 35–45)
PH BLOOD GAS: 7.35 (ref 7.35–7.45)
PLATELET # BLD AUTO: 226 K/UL (ref 130–450)
PMV BLD AUTO: 12 FL (ref 7–12)
PO2: 133.8 MMHG (ref 75–100)
POTASSIUM SERPL-SCNC: 3.9 MMOL/L (ref 3.5–5)
PROT SERPL-MCNC: 8.1 G/DL (ref 6.4–8.3)
PROTHROMBIN TIME: 13.2 SEC (ref 9.3–12.4)
RBC # BLD AUTO: 4.72 M/UL (ref 3.8–5.8)
RBC # BLD: ABNORMAL 10*6/UL
SALICYLATES SERPL-MCNC: <0.3 MG/DL (ref 0–30)
SARS-COV-2 RDRP RESP QL NAA+PROBE: NOT DETECTED
SODIUM SERPL-SCNC: 132 MMOL/L (ref 132–146)
SODIUM UR-SCNC: 40 MMOL/L
SOURCE, BLOOD GAS: ABNORMAL
SPECIMEN DESCRIPTION: NORMAL
THB: 13.2 G/DL (ref 11.5–16.5)
TIME ANALYZED: 1854
TOXIC TRICYCLIC SC,BLOOD: NEGATIVE
TROPONIN I SERPL HS-MCNC: 34 NG/L (ref 0–11)
WBC OTHER # BLD: 19.1 K/UL (ref 4.5–11.5)

## 2023-09-19 PROCEDURE — 99285 EMERGENCY DEPT VISIT HI MDM: CPT

## 2023-09-19 PROCEDURE — 70450 CT HEAD/BRAIN W/O DYE: CPT

## 2023-09-19 PROCEDURE — 51702 INSERT TEMP BLADDER CATH: CPT

## 2023-09-19 PROCEDURE — 87635 SARS-COV-2 COVID-19 AMP PRB: CPT

## 2023-09-19 PROCEDURE — 84484 ASSAY OF TROPONIN QUANT: CPT

## 2023-09-19 PROCEDURE — 93005 ELECTROCARDIOGRAM TRACING: CPT

## 2023-09-19 PROCEDURE — 2140000000 HC CCU INTERMEDIATE R&B

## 2023-09-19 PROCEDURE — 80053 COMPREHEN METABOLIC PANEL: CPT

## 2023-09-19 PROCEDURE — 82550 ASSAY OF CK (CPK): CPT

## 2023-09-19 PROCEDURE — 71250 CT THORAX DX C-: CPT

## 2023-09-19 PROCEDURE — 2580000003 HC RX 258

## 2023-09-19 PROCEDURE — 80307 DRUG TEST PRSMV CHEM ANLYZR: CPT

## 2023-09-19 PROCEDURE — 87040 BLOOD CULTURE FOR BACTERIA: CPT

## 2023-09-19 PROCEDURE — 2500000003 HC RX 250 WO HCPCS

## 2023-09-19 PROCEDURE — 82570 ASSAY OF URINE CREATININE: CPT

## 2023-09-19 PROCEDURE — 87502 INFLUENZA DNA AMP PROBE: CPT

## 2023-09-19 PROCEDURE — 96374 THER/PROPH/DIAG INJ IV PUSH: CPT

## 2023-09-19 PROCEDURE — 80179 DRUG ASSAY SALICYLATE: CPT

## 2023-09-19 PROCEDURE — 72125 CT NECK SPINE W/O DYE: CPT

## 2023-09-19 PROCEDURE — 83690 ASSAY OF LIPASE: CPT

## 2023-09-19 PROCEDURE — 83735 ASSAY OF MAGNESIUM: CPT

## 2023-09-19 PROCEDURE — 82533 TOTAL CORTISOL: CPT

## 2023-09-19 PROCEDURE — 74176 CT ABD & PELVIS W/O CONTRAST: CPT

## 2023-09-19 PROCEDURE — G0480 DRUG TEST DEF 1-7 CLASSES: HCPCS

## 2023-09-19 PROCEDURE — 6360000002 HC RX W HCPCS

## 2023-09-19 PROCEDURE — 85610 PROTHROMBIN TIME: CPT

## 2023-09-19 PROCEDURE — 84300 ASSAY OF URINE SODIUM: CPT

## 2023-09-19 PROCEDURE — 85025 COMPLETE CBC W/AUTO DIFF WBC: CPT

## 2023-09-19 PROCEDURE — 82805 BLOOD GASES W/O2 SATURATION: CPT

## 2023-09-19 PROCEDURE — 83935 ASSAY OF URINE OSMOLALITY: CPT

## 2023-09-19 PROCEDURE — 82248 BILIRUBIN DIRECT: CPT

## 2023-09-19 PROCEDURE — 72128 CT CHEST SPINE W/O DYE: CPT

## 2023-09-19 PROCEDURE — 72131 CT LUMBAR SPINE W/O DYE: CPT

## 2023-09-19 PROCEDURE — 83605 ASSAY OF LACTIC ACID: CPT

## 2023-09-19 PROCEDURE — 0HBNXZX EXCISION OF LEFT FOOT SKIN, EXTERNAL APPROACH, DIAGNOSTIC: ICD-10-PCS

## 2023-09-19 PROCEDURE — 80143 DRUG ASSAY ACETAMINOPHEN: CPT

## 2023-09-19 RX ORDER — NAPROXEN 500 MG/1
500 TABLET ORAL 2 TIMES DAILY WITH MEALS
COMMUNITY

## 2023-09-19 RX ORDER — SODIUM CHLORIDE 9 MG/ML
INJECTION, SOLUTION INTRAVENOUS CONTINUOUS
Status: DISCONTINUED | OUTPATIENT
Start: 2023-09-19 | End: 2023-09-20

## 2023-09-19 RX ORDER — 0.9 % SODIUM CHLORIDE 0.9 %
1000 INTRAVENOUS SOLUTION INTRAVENOUS ONCE
Status: COMPLETED | OUTPATIENT
Start: 2023-09-19 | End: 2023-09-19

## 2023-09-19 RX ORDER — FENTANYL CITRATE 50 UG/ML
50 INJECTION, SOLUTION INTRAMUSCULAR; INTRAVENOUS ONCE
Status: COMPLETED | OUTPATIENT
Start: 2023-09-19 | End: 2023-09-19

## 2023-09-19 RX ADMIN — SODIUM CHLORIDE 1000 ML: 9 INJECTION, SOLUTION INTRAVENOUS at 19:30

## 2023-09-19 RX ADMIN — SODIUM CHLORIDE 1000 ML: 9 INJECTION, SOLUTION INTRAVENOUS at 18:07

## 2023-09-19 RX ADMIN — FENTANYL CITRATE 50 MCG: 50 INJECTION INTRAMUSCULAR; INTRAVENOUS at 20:49

## 2023-09-19 RX ADMIN — SODIUM CHLORIDE: 9 INJECTION, SOLUTION INTRAVENOUS at 22:12

## 2023-09-19 RX ADMIN — SODIUM BICARBONATE: 84 INJECTION, SOLUTION INTRAVENOUS at 22:10

## 2023-09-19 ASSESSMENT — PAIN DESCRIPTION - DESCRIPTORS: DESCRIPTORS: ACHING;THROBBING;STABBING

## 2023-09-19 ASSESSMENT — PAIN - FUNCTIONAL ASSESSMENT: PAIN_FUNCTIONAL_ASSESSMENT: 0-10

## 2023-09-19 ASSESSMENT — PAIN DESCRIPTION - LOCATION
LOCATION: GENERALIZED
LOCATION: OTHER (COMMENT)

## 2023-09-19 ASSESSMENT — PAIN SCALES - GENERAL
PAINLEVEL_OUTOF10: 10
PAINLEVEL_OUTOF10: 10

## 2023-09-20 LAB
ALBUMIN SERPL-MCNC: 2 G/DL (ref 3.5–5.2)
ALBUMIN SERPL-MCNC: 2.6 G/DL (ref 3.5–5.2)
ALBUMIN SERPL-MCNC: 2.7 G/DL (ref 3.5–5.2)
ALBUMIN SERPL-MCNC: 2.7 G/DL (ref 3.5–5.2)
ALBUMIN SERPL-MCNC: 2.8 G/DL (ref 3.5–5.2)
ALP SERPL-CCNC: 102 U/L (ref 40–129)
ALP SERPL-CCNC: 63 U/L (ref 40–129)
ALP SERPL-CCNC: 66 U/L (ref 40–129)
ALP SERPL-CCNC: 71 U/L (ref 40–129)
ALP SERPL-CCNC: 85 U/L (ref 40–129)
ALT SERPL-CCNC: 35 U/L (ref 0–40)
ALT SERPL-CCNC: 36 U/L (ref 0–40)
ALT SERPL-CCNC: 37 U/L (ref 0–40)
AMMONIA PLAS-SCNC: 31 UMOL/L (ref 16–60)
ANION GAP SERPL CALCULATED.3IONS-SCNC: 21 MMOL/L (ref 7–16)
ANION GAP SERPL CALCULATED.3IONS-SCNC: 24 MMOL/L (ref 7–16)
ANION GAP SERPL CALCULATED.3IONS-SCNC: 24 MMOL/L (ref 7–16)
ANION GAP SERPL CALCULATED.3IONS-SCNC: 27 MMOL/L (ref 7–16)
ANION GAP SERPL CALCULATED.3IONS-SCNC: 28 MMOL/L (ref 7–16)
AST SERPL-CCNC: 45 U/L (ref 0–39)
AST SERPL-CCNC: 47 U/L (ref 0–39)
AST SERPL-CCNC: 48 U/L (ref 0–39)
AST SERPL-CCNC: 52 U/L (ref 0–39)
AST SERPL-CCNC: 58 U/L (ref 0–39)
B.E.: 0.5 MMOL/L (ref -3–3)
BILIRUB SERPL-MCNC: 0.2 MG/DL (ref 0–1.2)
BILIRUB SERPL-MCNC: 0.3 MG/DL (ref 0–1.2)
BUN SERPL-MCNC: 148 MG/DL (ref 6–20)
BUN SERPL-MCNC: 159 MG/DL (ref 6–20)
BUN SERPL-MCNC: 159 MG/DL (ref 6–20)
BUN SERPL-MCNC: 176 MG/DL (ref 6–20)
BUN SERPL-MCNC: 176 MG/DL (ref 6–20)
CA-I BLD-SCNC: 0.81 MMOL/L (ref 1.15–1.33)
CALCIUM SERPL-MCNC: 6.1 MG/DL (ref 8.6–10.2)
CALCIUM SERPL-MCNC: 6.1 MG/DL (ref 8.6–10.2)
CALCIUM SERPL-MCNC: 6.3 MG/DL (ref 8.6–10.2)
CALCIUM SERPL-MCNC: 7 MG/DL (ref 8.6–10.2)
CALCIUM SERPL-MCNC: 7.2 MG/DL (ref 8.6–10.2)
CHLORIDE SERPL-SCNC: 87 MMOL/L (ref 98–107)
CHLORIDE SERPL-SCNC: 88 MMOL/L (ref 98–107)
CHLORIDE SERPL-SCNC: 90 MMOL/L (ref 98–107)
CHLORIDE SERPL-SCNC: 92 MMOL/L (ref 98–107)
CHLORIDE SERPL-SCNC: 92 MMOL/L (ref 98–107)
CHLORIDE UR-SCNC: <20 MMOL/L
CK SERPL-CCNC: 6438 U/L (ref 20–200)
CK SERPL-CCNC: 6926 U/L (ref 20–200)
CK SERPL-CCNC: 7820 U/L (ref 20–200)
CK SERPL-CCNC: 8193 U/L (ref 20–200)
CO2 SERPL-SCNC: 18 MMOL/L (ref 22–29)
CO2 SERPL-SCNC: 19 MMOL/L (ref 22–29)
CO2 SERPL-SCNC: 21 MMOL/L (ref 22–29)
CO2 SERPL-SCNC: 21 MMOL/L (ref 22–29)
CO2 SERPL-SCNC: 23 MMOL/L (ref 22–29)
COHB: 0.3 % (ref 0–1.5)
CORTIS SERPL-MCNC: 25.3 UG/DL (ref 2.7–18.4)
CORTISOL COLLECTION INFO: ABNORMAL
CREAT SERPL-MCNC: 11.7 MG/DL (ref 0.7–1.2)
CREAT SERPL-MCNC: 11.9 MG/DL (ref 0.7–1.2)
CREAT SERPL-MCNC: 7.6 MG/DL (ref 0.7–1.2)
CREAT SERPL-MCNC: 8.6 MG/DL (ref 0.7–1.2)
CREAT SERPL-MCNC: 9.2 MG/DL (ref 0.7–1.2)
CREAT UR-MCNC: 102.5 MG/DL (ref 40–278)
CREAT UR-MCNC: 103 MG/DL (ref 40–278)
CREAT UR-MCNC: 103.4 MG/DL (ref 40–278)
CRITICAL: ABNORMAL
DATE ANALYZED: ABNORMAL
DATE OF COLLECTION: ABNORMAL
EKG ATRIAL RATE: 86 BPM
EKG P AXIS: 64 DEGREES
EKG P-R INTERVAL: 174 MS
EKG Q-T INTERVAL: 422 MS
EKG QRS DURATION: 108 MS
EKG QTC CALCULATION (BAZETT): 504 MS
EKG R AXIS: 44 DEGREES
EKG T AXIS: 50 DEGREES
EKG VENTRICULAR RATE: 86 BPM
GFR SERPL CREATININE-BSD FRML MDRD: 5 ML/MIN/1.73M2
GFR SERPL CREATININE-BSD FRML MDRD: 5 ML/MIN/1.73M2
GFR SERPL CREATININE-BSD FRML MDRD: 6 ML/MIN/1.73M2
GFR SERPL CREATININE-BSD FRML MDRD: 7 ML/MIN/1.73M2
GFR SERPL CREATININE-BSD FRML MDRD: 8 ML/MIN/1.73M2
GLUCOSE SERPL-MCNC: 127 MG/DL (ref 74–99)
GLUCOSE SERPL-MCNC: 140 MG/DL (ref 74–99)
GLUCOSE SERPL-MCNC: 142 MG/DL (ref 74–99)
GLUCOSE SERPL-MCNC: 185 MG/DL (ref 74–99)
GLUCOSE SERPL-MCNC: 205 MG/DL (ref 74–99)
HBA1C MFR BLD: 5.8 % (ref 4–5.6)
HCO3: 22.5 MMOL/L (ref 22–26)
HHB: 3 % (ref 0–5)
LAB: ABNORMAL
LACTATE BLDV-SCNC: 1.4 MMOL/L (ref 0.5–1.9)
LIPASE SERPL-CCNC: 78 U/L (ref 13–60)
Lab: 645
MAGNESIUM SERPL-MCNC: 1.9 MG/DL (ref 1.6–2.6)
MAGNESIUM SERPL-MCNC: 2 MG/DL (ref 1.6–2.6)
MAGNESIUM SERPL-MCNC: 2 MG/DL (ref 1.6–2.6)
MAGNESIUM SERPL-MCNC: 2.2 MG/DL (ref 1.6–2.6)
METHB: 0.3 % (ref 0–1.5)
MICROALBUMIN UR-MCNC: 76 MG/L (ref 0–19)
MICROALBUMIN/CREAT UR-RTO: 74 MCG/MG CREAT (ref 0–30)
MODE: ABNORMAL
O2 CONTENT: 16 ML/DL
O2 SATURATION: 97 % (ref 92–98.5)
O2HB: 96.4 % (ref 94–97)
OPERATOR ID: 1893
OSMOLALITY SERPL: 339 MOSM/KG (ref 285–310)
OSMOLALITY UR: 354 MOSM/KG (ref 300–900)
PATIENT TEMP: 37 C
PCO2: 28.4 MMHG (ref 35–45)
PH BLOOD GAS: 7.52 (ref 7.35–7.45)
PHOSPHATE SERPL-MCNC: 6.2 MG/DL (ref 2.5–4.5)
PO2: 94.7 MMHG (ref 75–100)
POTASSIUM SERPL-SCNC: 2.5 MMOL/L (ref 3.5–5)
POTASSIUM SERPL-SCNC: 2.6 MMOL/L (ref 3.5–5)
POTASSIUM SERPL-SCNC: 2.7 MMOL/L (ref 3.5–5)
POTASSIUM SERPL-SCNC: 2.8 MMOL/L (ref 3.5–5)
POTASSIUM SERPL-SCNC: 4.4 MMOL/L (ref 3.5–5)
PROT SERPL-MCNC: 6 G/DL (ref 6.4–8.3)
PROT SERPL-MCNC: 6.1 G/DL (ref 6.4–8.3)
PROT SERPL-MCNC: 6.1 G/DL (ref 6.4–8.3)
PROT SERPL-MCNC: 6.5 G/DL (ref 6.4–8.3)
PROT SERPL-MCNC: 6.5 G/DL (ref 6.4–8.3)
PTH-INTACT SERPL-MCNC: 292.8 PG/ML (ref 15–65)
SODIUM SERPL-SCNC: 134 MMOL/L (ref 132–146)
SODIUM SERPL-SCNC: 135 MMOL/L (ref 132–146)
SODIUM SERPL-SCNC: 136 MMOL/L (ref 132–146)
SODIUM UR-SCNC: 60 MMOL/L
SOURCE, BLOOD GAS: ABNORMAL
THB: 11.7 G/DL (ref 11.5–16.5)
TIME ANALYZED: 650
TOTAL PROTEIN, URINE: 38 MG/DL (ref 0–12)
TROPONIN I SERPL HS-MCNC: 32 NG/L (ref 0–11)
TSH SERPL DL<=0.05 MIU/L-ACNC: 0.7 UIU/ML (ref 0.27–4.2)
URATE SERPL-MCNC: 17.7 MG/DL (ref 3.4–7)
URINE TOTAL PROTEIN CREATININE RATIO: 0.37 (ref 0–0.2)

## 2023-09-20 PROCEDURE — 6360000002 HC RX W HCPCS

## 2023-09-20 PROCEDURE — 82140 ASSAY OF AMMONIA: CPT

## 2023-09-20 PROCEDURE — 84443 ASSAY THYROID STIM HORMONE: CPT

## 2023-09-20 PROCEDURE — 80171 DRUG SCREEN QUANT GABAPENTIN: CPT

## 2023-09-20 PROCEDURE — 83935 ASSAY OF URINE OSMOLALITY: CPT

## 2023-09-20 PROCEDURE — 84300 ASSAY OF URINE SODIUM: CPT

## 2023-09-20 PROCEDURE — 84550 ASSAY OF BLOOD/URIC ACID: CPT

## 2023-09-20 PROCEDURE — 2580000003 HC RX 258

## 2023-09-20 PROCEDURE — 36415 COLL VENOUS BLD VENIPUNCTURE: CPT

## 2023-09-20 PROCEDURE — 83735 ASSAY OF MAGNESIUM: CPT

## 2023-09-20 PROCEDURE — 2140000000 HC CCU INTERMEDIATE R&B

## 2023-09-20 PROCEDURE — 82043 UR ALBUMIN QUANTITATIVE: CPT

## 2023-09-20 PROCEDURE — 82436 ASSAY OF URINE CHLORIDE: CPT

## 2023-09-20 PROCEDURE — 87086 URINE CULTURE/COLONY COUNT: CPT

## 2023-09-20 PROCEDURE — 82570 ASSAY OF URINE CREATININE: CPT

## 2023-09-20 PROCEDURE — 99222 1ST HOSP IP/OBS MODERATE 55: CPT | Performed by: FAMILY MEDICINE

## 2023-09-20 PROCEDURE — 83930 ASSAY OF BLOOD OSMOLALITY: CPT

## 2023-09-20 PROCEDURE — 84484 ASSAY OF TROPONIN QUANT: CPT

## 2023-09-20 PROCEDURE — 83690 ASSAY OF LIPASE: CPT

## 2023-09-20 PROCEDURE — 83970 ASSAY OF PARATHORMONE: CPT

## 2023-09-20 PROCEDURE — 83605 ASSAY OF LACTIC ACID: CPT

## 2023-09-20 PROCEDURE — 82805 BLOOD GASES W/O2 SATURATION: CPT

## 2023-09-20 PROCEDURE — 80053 COMPREHEN METABOLIC PANEL: CPT

## 2023-09-20 PROCEDURE — 82330 ASSAY OF CALCIUM: CPT

## 2023-09-20 PROCEDURE — 93010 ELECTROCARDIOGRAM REPORT: CPT | Performed by: INTERNAL MEDICINE

## 2023-09-20 PROCEDURE — 6360000002 HC RX W HCPCS: Performed by: INTERNAL MEDICINE

## 2023-09-20 PROCEDURE — 82533 TOTAL CORTISOL: CPT

## 2023-09-20 PROCEDURE — 2500000003 HC RX 250 WO HCPCS

## 2023-09-20 PROCEDURE — 84156 ASSAY OF PROTEIN URINE: CPT

## 2023-09-20 PROCEDURE — 82550 ASSAY OF CK (CPK): CPT

## 2023-09-20 PROCEDURE — 84100 ASSAY OF PHOSPHORUS: CPT

## 2023-09-20 PROCEDURE — 83036 HEMOGLOBIN GLYCOSYLATED A1C: CPT

## 2023-09-20 RX ORDER — HEPARIN SODIUM 10000 [USP'U]/ML
5000 INJECTION, SOLUTION INTRAVENOUS; SUBCUTANEOUS EVERY 8 HOURS
Status: DISCONTINUED | OUTPATIENT
Start: 2023-09-20 | End: 2023-09-29 | Stop reason: HOSPADM

## 2023-09-20 RX ORDER — LISINOPRIL 20 MG/1
20 TABLET ORAL DAILY
Status: DISCONTINUED | OUTPATIENT
Start: 2023-09-20 | End: 2023-09-29 | Stop reason: HOSPADM

## 2023-09-20 RX ORDER — SERTRALINE HYDROCHLORIDE 100 MG/1
100 TABLET, FILM COATED ORAL DAILY
Status: DISCONTINUED | OUTPATIENT
Start: 2023-09-20 | End: 2023-09-29 | Stop reason: HOSPADM

## 2023-09-20 RX ORDER — LORAZEPAM 1 MG/1
1 TABLET ORAL
Status: DISCONTINUED | OUTPATIENT
Start: 2023-09-20 | End: 2023-09-29 | Stop reason: HOSPADM

## 2023-09-20 RX ORDER — CALCIUM GLUCONATE 10 MG/ML
1000 INJECTION, SOLUTION INTRAVENOUS ONCE
Status: COMPLETED | OUTPATIENT
Start: 2023-09-20 | End: 2023-09-20

## 2023-09-20 RX ORDER — PANTOPRAZOLE SODIUM 40 MG/1
40 TABLET, DELAYED RELEASE ORAL
Status: DISCONTINUED | OUTPATIENT
Start: 2023-09-20 | End: 2023-09-29 | Stop reason: HOSPADM

## 2023-09-20 RX ORDER — AMITRIPTYLINE HYDROCHLORIDE 25 MG/1
25 TABLET, FILM COATED ORAL NIGHTLY
Status: DISCONTINUED | OUTPATIENT
Start: 2023-09-20 | End: 2023-09-29 | Stop reason: HOSPADM

## 2023-09-20 RX ORDER — LANOLIN ALCOHOL/MO/W.PET/CERES
100 CREAM (GRAM) TOPICAL DAILY
Status: DISCONTINUED | OUTPATIENT
Start: 2023-09-20 | End: 2023-09-29 | Stop reason: HOSPADM

## 2023-09-20 RX ORDER — SODIUM CHLORIDE 0.9 % (FLUSH) 0.9 %
5-40 SYRINGE (ML) INJECTION PRN
Status: DISCONTINUED | OUTPATIENT
Start: 2023-09-20 | End: 2023-09-29 | Stop reason: HOSPADM

## 2023-09-20 RX ORDER — HYDROCHLOROTHIAZIDE 25 MG/1
25 TABLET ORAL DAILY
Status: DISCONTINUED | OUTPATIENT
Start: 2023-09-20 | End: 2023-09-26

## 2023-09-20 RX ORDER — DEXTROSE MONOHYDRATE 100 MG/ML
INJECTION, SOLUTION INTRAVENOUS CONTINUOUS PRN
Status: DISCONTINUED | OUTPATIENT
Start: 2023-09-20 | End: 2023-09-29 | Stop reason: HOSPADM

## 2023-09-20 RX ORDER — PROCHLORPERAZINE MALEATE 10 MG
10 TABLET ORAL EVERY 6 HOURS PRN
Status: DISCONTINUED | OUTPATIENT
Start: 2023-09-20 | End: 2023-09-29 | Stop reason: HOSPADM

## 2023-09-20 RX ORDER — SODIUM CHLORIDE 9 MG/ML
INJECTION, SOLUTION INTRAVENOUS PRN
Status: DISCONTINUED | OUTPATIENT
Start: 2023-09-20 | End: 2023-09-29 | Stop reason: HOSPADM

## 2023-09-20 RX ORDER — LORAZEPAM 2 MG/ML
2 INJECTION INTRAMUSCULAR
Status: DISCONTINUED | OUTPATIENT
Start: 2023-09-20 | End: 2023-09-29 | Stop reason: HOSPADM

## 2023-09-20 RX ORDER — LORAZEPAM 1 MG/1
4 TABLET ORAL
Status: DISCONTINUED | OUTPATIENT
Start: 2023-09-20 | End: 2023-09-29 | Stop reason: HOSPADM

## 2023-09-20 RX ORDER — FOLIC ACID 1 MG/1
1 TABLET ORAL DAILY
Status: DISCONTINUED | OUTPATIENT
Start: 2023-09-20 | End: 2023-09-29 | Stop reason: HOSPADM

## 2023-09-20 RX ORDER — ONDANSETRON 2 MG/ML
4 INJECTION INTRAMUSCULAR; INTRAVENOUS EVERY 6 HOURS PRN
Status: DISCONTINUED | OUTPATIENT
Start: 2023-09-20 | End: 2023-09-20 | Stop reason: CLARIF

## 2023-09-20 RX ORDER — LORAZEPAM 1 MG/1
2 TABLET ORAL
Status: DISCONTINUED | OUTPATIENT
Start: 2023-09-20 | End: 2023-09-29 | Stop reason: HOSPADM

## 2023-09-20 RX ORDER — POLYETHYLENE GLYCOL 3350 17 G/17G
17 POWDER, FOR SOLUTION ORAL DAILY PRN
Status: DISCONTINUED | OUTPATIENT
Start: 2023-09-20 | End: 2023-09-29 | Stop reason: HOSPADM

## 2023-09-20 RX ORDER — LORAZEPAM 2 MG/ML
1 INJECTION INTRAMUSCULAR
Status: DISCONTINUED | OUTPATIENT
Start: 2023-09-20 | End: 2023-09-29 | Stop reason: HOSPADM

## 2023-09-20 RX ORDER — SODIUM CHLORIDE AND POTASSIUM CHLORIDE 300; 900 MG/100ML; MG/100ML
INJECTION, SOLUTION INTRAVENOUS CONTINUOUS
Status: DISCONTINUED | OUTPATIENT
Start: 2023-09-20 | End: 2023-09-22

## 2023-09-20 RX ORDER — LORAZEPAM 2 MG/ML
3 INJECTION INTRAMUSCULAR
Status: DISCONTINUED | OUTPATIENT
Start: 2023-09-20 | End: 2023-09-29 | Stop reason: HOSPADM

## 2023-09-20 RX ORDER — PROCHLORPERAZINE EDISYLATE 5 MG/ML
10 INJECTION INTRAMUSCULAR; INTRAVENOUS EVERY 6 HOURS PRN
Status: DISCONTINUED | OUTPATIENT
Start: 2023-09-20 | End: 2023-09-20 | Stop reason: CLARIF

## 2023-09-20 RX ORDER — LORAZEPAM 1 MG/1
3 TABLET ORAL
Status: DISCONTINUED | OUTPATIENT
Start: 2023-09-20 | End: 2023-09-29 | Stop reason: HOSPADM

## 2023-09-20 RX ORDER — LISINOPRIL AND HYDROCHLOROTHIAZIDE 25; 20 MG/1; MG/1
1 TABLET ORAL DAILY
Status: DISCONTINUED | OUTPATIENT
Start: 2023-09-20 | End: 2023-09-20 | Stop reason: CLARIF

## 2023-09-20 RX ORDER — FENTANYL CITRATE 50 UG/ML
25 INJECTION, SOLUTION INTRAMUSCULAR; INTRAVENOUS
Status: COMPLETED | OUTPATIENT
Start: 2023-09-20 | End: 2023-09-20

## 2023-09-20 RX ORDER — PROMETHAZINE HYDROCHLORIDE 12.5 MG/1
12.5 TABLET ORAL EVERY 6 HOURS PRN
Status: DISCONTINUED | OUTPATIENT
Start: 2023-09-20 | End: 2023-09-20 | Stop reason: CLARIF

## 2023-09-20 RX ORDER — AMLODIPINE BESYLATE 5 MG/1
5 TABLET ORAL DAILY
Status: DISCONTINUED | OUTPATIENT
Start: 2023-09-20 | End: 2023-09-23

## 2023-09-20 RX ORDER — LIDOCAINE 4 G/G
1 PATCH TOPICAL DAILY PRN
Status: DISCONTINUED | OUTPATIENT
Start: 2023-09-20 | End: 2023-09-29 | Stop reason: HOSPADM

## 2023-09-20 RX ORDER — GABAPENTIN 300 MG/1
300 CAPSULE ORAL 2 TIMES DAILY
Status: DISCONTINUED | OUTPATIENT
Start: 2023-09-20 | End: 2023-09-29 | Stop reason: HOSPADM

## 2023-09-20 RX ORDER — PROCHLORPERAZINE MALEATE 10 MG
10 TABLET ORAL EVERY 6 HOURS PRN
Status: DISCONTINUED | OUTPATIENT
Start: 2023-09-20 | End: 2023-09-20 | Stop reason: CLARIF

## 2023-09-20 RX ORDER — PROCHLORPERAZINE EDISYLATE 5 MG/ML
10 INJECTION INTRAMUSCULAR; INTRAVENOUS EVERY 6 HOURS PRN
Status: DISCONTINUED | OUTPATIENT
Start: 2023-09-20 | End: 2023-09-29 | Stop reason: HOSPADM

## 2023-09-20 RX ORDER — ACETAMINOPHEN 650 MG/1
650 SUPPOSITORY RECTAL EVERY 6 HOURS PRN
Status: DISCONTINUED | OUTPATIENT
Start: 2023-09-20 | End: 2023-09-29 | Stop reason: HOSPADM

## 2023-09-20 RX ORDER — LORAZEPAM 2 MG/ML
4 INJECTION INTRAMUSCULAR
Status: DISCONTINUED | OUTPATIENT
Start: 2023-09-20 | End: 2023-09-29 | Stop reason: HOSPADM

## 2023-09-20 RX ORDER — SODIUM CHLORIDE 0.9 % (FLUSH) 0.9 %
5-40 SYRINGE (ML) INJECTION EVERY 12 HOURS SCHEDULED
Status: DISCONTINUED | OUTPATIENT
Start: 2023-09-20 | End: 2023-09-29 | Stop reason: HOSPADM

## 2023-09-20 RX ORDER — ACETAMINOPHEN 325 MG/1
650 TABLET ORAL EVERY 6 HOURS PRN
Status: DISCONTINUED | OUTPATIENT
Start: 2023-09-20 | End: 2023-09-29 | Stop reason: HOSPADM

## 2023-09-20 RX ORDER — CALCIUM GLUCONATE 94 MG/ML
1000 INJECTION, SOLUTION INTRAVENOUS ONCE
Status: DISCONTINUED | OUTPATIENT
Start: 2023-09-20 | End: 2023-09-20 | Stop reason: SDUPTHER

## 2023-09-20 RX ORDER — SODIUM CHLORIDE AND POTASSIUM CHLORIDE 150; 900 MG/100ML; MG/100ML
INJECTION, SOLUTION INTRAVENOUS CONTINUOUS
Status: DISCONTINUED | OUTPATIENT
Start: 2023-09-20 | End: 2023-09-20

## 2023-09-20 RX ADMIN — FENTANYL CITRATE 25 MCG: 50 INJECTION INTRAMUSCULAR; INTRAVENOUS at 01:25

## 2023-09-20 RX ADMIN — POTASSIUM CHLORIDE AND SODIUM CHLORIDE: 900; 150 INJECTION, SOLUTION INTRAVENOUS at 08:13

## 2023-09-20 RX ADMIN — HEPARIN SODIUM 5000 UNITS: 10000 INJECTION INTRAVENOUS; SUBCUTANEOUS at 10:24

## 2023-09-20 RX ADMIN — CALCIUM GLUCONATE 1000 MG: 10 INJECTION, SOLUTION INTRAVENOUS at 08:10

## 2023-09-20 RX ADMIN — POTASSIUM CHLORIDE AND SODIUM CHLORIDE: 900; 300 INJECTION, SOLUTION INTRAVENOUS at 20:54

## 2023-09-20 RX ADMIN — SODIUM CHLORIDE, PRESERVATIVE FREE 10 ML: 5 INJECTION INTRAVENOUS at 20:56

## 2023-09-20 RX ADMIN — SODIUM CHLORIDE: 9 INJECTION, SOLUTION INTRAVENOUS at 05:07

## 2023-09-20 RX ADMIN — SODIUM BICARBONATE: 84 INJECTION, SOLUTION INTRAVENOUS at 03:02

## 2023-09-20 RX ADMIN — HEPARIN SODIUM 5000 UNITS: 10000 INJECTION INTRAVENOUS; SUBCUTANEOUS at 17:30

## 2023-09-20 RX ADMIN — HEPARIN SODIUM 5000 UNITS: 10000 INJECTION INTRAVENOUS; SUBCUTANEOUS at 01:02

## 2023-09-20 ASSESSMENT — PAIN DESCRIPTION - LOCATION
LOCATION: GENERALIZED
LOCATION: GENERALIZED

## 2023-09-20 ASSESSMENT — ENCOUNTER SYMPTOMS
ABDOMINAL DISTENTION: 0
CONSTIPATION: 0
BLOOD IN STOOL: 0
SHORTNESS OF BREATH: 0
WHEEZING: 0
COUGH: 0
SORE THROAT: 0
VOMITING: 0
RHINORRHEA: 0
NAUSEA: 0
DIARRHEA: 0
ABDOMINAL PAIN: 0

## 2023-09-20 ASSESSMENT — PAIN SCALES - GENERAL
PAINLEVEL_OUTOF10: 0
PAINLEVEL_OUTOF10: 10
PAINLEVEL_OUTOF10: 7
PAINLEVEL_OUTOF10: 6

## 2023-09-20 ASSESSMENT — PAIN DESCRIPTION - DESCRIPTORS: DESCRIPTORS: STABBING;THROBBING;ACHING

## 2023-09-20 NOTE — CARE COORDINATION
Social Work Discharge Planning:  SW met with patient and significant other Halima Barraza. Patient lives alone philip 3rd floor apt with 18 steps to gain access to entrance. Patient is on 2 L, doesn't wear at home. Patient has no hx with HHC or SNF. PTA patient independent with no DME. PCP is Dr. Junie Coleman. Patient signifcant other will transport home at discharge. SW will continue to follow and assist with transition of care.   Electronically signed by DEMARIO Myers on 9/20/2023 at 7:35 PM

## 2023-09-20 NOTE — CONSULTS
[address], [address] [place] [address]                                  CONSULTATION    PATIENT NAME: Marianela Matias                   :        1970  MED REC NO:   [id number]                            ROOM:       21  ACCOUNT NO:   [de-identified]                           ADMIT DATE: 2023  PROVIDER:     Chen Santamaria MD    CONSULT DATE:  2023    HISTORY OF PRESENT ILLNESS:  A 68-year-old male, we are being asked to  see for acute kidney injury. The patient presenting to the hospital following being found down by  family. He has history of alcohol use disorder, hypotension among other  medical problem. In the ED, he was noted to have significant PAULINA with creatinine up to 15  and BUN in the 100s. He was started on IV fluid along with placing a  Barrientos in place. I saw him this morning in the ED, he is on oxygen via nasal cannula. He  continued to be confused, the blood pressure is stable. He has a Barrientos  in place. Family at bedside could not provide a lot of history except for the fact  that he is known to be significantly alcoholic. He continued to look dry. There is no tender spots noted. REVIEW OF SYSTEMS:  Unable to obtain due to clinical condition. ALLERGIES:  Include MEDROL. PAST MEDICAL HISTORY:  Includes hypotension. PAST SURGICAL HISTORY:  Includes scoping. SOCIAL HISTORY:  Positive for drinking. FAMILY HISTORY:  Reviewed and noncontributory. CURRENT MEDICATIONS:  Include normal saline, Norvasc, folic acid. PHYSICAL EXAMINATION:  VITAL SIGNS:  Blood pressure 129/76, heart rate 91. HEAD:  Atraumatic, normocephalic. NECK:  Supple and symmetrical.  No JVD. EYES:  Pupils are round and reactive to light bilaterally. HEART:  Normal rate. ABDOMEN:  Soft, nontender. Bowel sounds active. No organomegaly. LUNGS:  Decreased breath sound at the bases. SKIN:  Dry.   PSYCHIATRIC: Cooperative. BLOOD WORK:  Creatinine 11.7. ASSESSMENT AND PLAN:  1. Acute kidney injury appeared to be mainly related to decreased  effective volume. With this degree of azotemia, cannot rule out  underlying ATN. His CT scan of the abdomen showing no hydronephrosis. He has a Barrientos in a place with okay urine output. At this point, I  would continue IV fluid resuscitation and followup serial basic  metabolic panel and urine output. I will check urinalysis along with  spot urine studies to quantify presence of proteinuria. Continue to  hold his nephrotoxic medication at home including Zestril and  hydrochlorothiazide, which might have exacerbated his underlying PAULINA. 2.  Mild rhabdomyolysis. 3.  History of alcoholism. 4.  Hypocalcemia would be gentle and replacement. 5.  We will follow along with you.         Sue Saini MD    D: 09/20/2023 10:39:52       T: 09/20/2023 11:17:39     [email or web address]  Job#: [id number]     Doc#: [id number]    CC:

## 2023-09-20 NOTE — H&P
[facility]  Family Medicine Residency Program  History and Physical    Patient:  Hiren Kinney 46 y.o. male MRN: [id number]     Date of Service: 9/20/2023    Hospital Day: 2    History of Present Illness   The patient is a 46 y.o. male with a past medical history of hypertension, GERD, depression, alcohol use disorder and chronic low back pain who presents emergency department for altered mental status. Patient was found on the floor in his apartment by his sister and girlfriend. When EMS found him, they noted that he was hypotensive and confused. Here in the emergency department, he does provide appropriate history. Patient states that he kept falling due to weakness until the point that he could not get up anymore. He stayed on the floor for 2 to 3 days. He tried to crawl from place to place but he could not get up. Patient stated that he was gradually feeling weak within the past days. Denies hitting his head. Denies loss of consciousness. Patient reporting pain and tremors all over his body. Denies fever, shortness of breath, nausea, vomiting, diarrhea, constipation and urinary symptoms. Patient reports binge alcohol use. Denies tobacco or any other drugs. Patient would like to remain remain full code at this time.     Emergency department:  Hypoxic at presentation, placed on nasal cannula 3 L of oxygen  Labs remarkable for severe PAULINA, rhabdomyolysis, metabolic acidosis with elevated anion gap, elevated lactic acid, hyperglycemia, hypocalcemia, troponin 34, elevated ALT and AST, elevated lipase, elevated cortisol and elevated white blood cell count  Blood cultures pending  Influenza and COVID-negative  ABG remarkable for metabolic acidosis with respiratory compensation  EKG remarkable for normal sinus rhythm, prolonged QTc and negative for acute ischemic changes  CT head negative for any acute intracranial normality  CT chest positive for COPD emphysematous changes and pulses. Heart sounds: Normal heart sounds. Pulmonary:      Effort: Pulmonary effort is normal. No respiratory distress. Breath sounds: Normal breath sounds. Comments: Nasal cannula 3 L of oxygen  Abdominal:      General: Bowel sounds are normal. There is no distension. Palpations: Abdomen is soft. Tenderness: There is abdominal tenderness (Generalized). There is no guarding or rebound. Musculoskeletal:         General: Tenderness (Generalized) present. Cervical back: Normal range of motion and neck supple. Right lower leg: No edema. Left lower leg: No edema. Skin:     General: Skin is dry. Capillary Refill: Capillary refill takes less than 2 seconds. Findings: Lesion (Skin scabs from crawling on the floor) present. No rash. Neurological:      Mental Status: He is alert and oriented to person, place, and time. Psychiatric:         Thought Content: Thought content normal.         Judgment: Judgment normal.         Labs and Imaging Studies   Basic Labs  CBC:   Recent Labs     09/19/23 1756   WBC 19.1*   RBC 4.72   HGB 13.9   HCT 39.2   MCV 83.1   MCH 29.4   MCHC 35.5*   RDW 14.9      MPV 12.0       BMP:    Recent Labs     09/19/23 1756      K 3.9   CL 77*   CO2 14*   *   CREATININE 14.9*   GLUCOSE 166*   CALCIUM 7.5*   PROT 8.1   LABALBU 3.5   BILITOT 0.3   ALKPHOS 87   AST 75*   ALT 51*       LIVER PROFILE:   Recent Labs     09/19/23 1756   AST 75*   ALT 51*   LIPASE 100*   BILIDIR <0.2   BILITOT 0.3   ALKPHOS 87       PT/INR:   Recent Labs     09/19/23 1756   PROTIME 13.2*   INR 1.2       APTT:   No results for input(s): \"APTT\" in the last 72 hours.     Fasting Lipid Panel:    Lab Results   Component Value Date/Time    CHOL 267 01/26/2018 04:00 PM    TRIG 158 01/26/2018 04:00 PM    HDL 87 01/26/2018 04:00 PM       Cardiac Enzymes:    Lab Results   Component Value Date    CKTOTAL 11,960 (H) 09/19/2023    TROPONINI <0.01 11/13/2020 the mA/kV was utilized to reduce the radiation dose to as low as reasonably achievable. COMPARISON: None. HISTORY: ORDERING SYSTEM PROVIDED HISTORY: fall pain TECHNOLOGIST PROVIDED HISTORY: Reason for exam:->fall pain What reading provider will be dictating this exam?->CRC FINDINGS: BONES/ALIGNMENT: There is normal alignment of the spine. The vertebral body heights are maintained. No osseous destructive lesion is seen. DEGENERATIVE CHANGES: No gross spinal canal stenosis or bony neural foraminal narrowing of the thoracic spine. SOFT TISSUES: No paraspinal mass is seen. Unremarkable CT of the thoracic spine. CT Head W/O Contrast    Result Date: 9/19/2023  EXAMINATION: CT OF THE HEAD WITHOUT CONTRAST  9/19/2023 9:24 pm TECHNIQUE: CT of the head was performed without the administration of intravenous contrast. Automated exposure control, iterative reconstruction, and/or weight based adjustment of the mA/kV was utilized to reduce the radiation dose to as low as reasonably achievable. COMPARISON: None. HISTORY: ORDERING SYSTEM PROVIDED HISTORY: fall weakness TECHNOLOGIST PROVIDED HISTORY: Reason for exam:->fall weakness Has a \"code stroke\" or \"stroke alert\" been called? ->No Decision Support Exception - unselect if not a suspected or confirmed emergency medical condition->Emergency Medical Condition (MA) What reading provider will be dictating this exam?->CRC FINDINGS: BRAIN/VENTRICLES: There is no acute intracranial hemorrhage, mass effect or midline shift. No abnormal extra-axial fluid collection. The gray-white differentiation is maintained without evidence of an acute infarct. There is no evidence of hydrocephalus. There is moderate cerebral atrophy for the patient's age. ORBITS: The visualized portion of the orbits demonstrate no acute abnormality. SINUSES: The visualized paranasal sinuses and mastoid air cells demonstrate no acute abnormality.  SOFT TISSUES/SKULL:  No acute abnormality of the visualized

## 2023-09-20 NOTE — ED NOTES
Patient is a fall risk, per fall risk assessment placed star outside of room.       Antelmo Beckman RN  09/19/23 [id number]

## 2023-09-20 NOTE — ED NOTES
Received critical labs for BUN, creatinine and calcium.  Section RNLillian notified     Consuelo Lucero RN  09/20/23 7416

## 2023-09-20 NOTE — PLAN OF CARE
Visited with Patient's family this evening at bedside. Provided updates regarding current status, and progress made thus far in patient care. Family was concerned about his kidney and liver function. Patient and family updated about the small improvements in kidney function as evident through lab results. Reassured that nephrologist was also following along on patient care. All questions were answered. Patient was more awake and alert, Cooperative and following commands. He reports improvement in level of pain. There was less tenderness to palpation over B/L upper and lower extremities, chest and abdomen.

## 2023-09-20 NOTE — PROGRESS NOTES
4 Eyes Skin Assessment     NAME:  Neha Andrew  YOB: 1970  MEDICAL RECORD NUMBER:  [id number]    The patient is being assessed for  Admission    I agree that at least one RN has performed a thorough Head to Toe Skin Assessment on the patient. ALL assessment sites listed below have been assessed. Areas assessed by both nurses:    Head, Face, Ears, Shoulders, Back, Chest, Arms, Elbows, Hands, Sacrum. Buttock, Coccyx, Ischium, and Legs. Feet and Heels        Does the Patient have a Wound? Yes wound(s) were present on assessment.  LDA wound assessment was Initiated and completed by RN       Gordon Prevention initiated by RN: Yes  Wound Care Orders initiated by RN: Yes    Pressure Injury (Stage 3,4, Unstageable, DTI, NWPT, and Complex wounds) if present, place Wound referral order by RN under : Yes    New Ostomies, if present place, Ostomy referral order under : No     Nurse 1 eSignature: Electronically signed by Marylu Madrid RN on 9/20/23 at 4:50 PM EDT    **SHARE this note so that the co-signing nurse can place an eSignature**    Nurse 2 eSignature: Electronically signed by Bessie Saldivar RN on 9/20/23 at 5:01 PM EDT

## 2023-09-21 LAB
ALBUMIN SERPL-MCNC: 2.5 G/DL (ref 3.5–5.2)
ALBUMIN SERPL-MCNC: 2.5 G/DL (ref 3.5–5.2)
ALBUMIN SERPL-MCNC: 2.6 G/DL (ref 3.5–5.2)
ALP SERPL-CCNC: 137 U/L (ref 40–129)
ALP SERPL-CCNC: 74 U/L (ref 40–129)
ALP SERPL-CCNC: 74 U/L (ref 40–129)
ALT SERPL-CCNC: 32 U/L (ref 0–40)
ALT SERPL-CCNC: 33 U/L (ref 0–40)
ALT SERPL-CCNC: 34 U/L (ref 0–40)
ANION GAP SERPL CALCULATED.3IONS-SCNC: 14 MMOL/L (ref 7–16)
ANION GAP SERPL CALCULATED.3IONS-SCNC: 16 MMOL/L (ref 7–16)
ANION GAP SERPL CALCULATED.3IONS-SCNC: 17 MMOL/L (ref 7–16)
ANION GAP SERPL CALCULATED.3IONS-SCNC: 20 MMOL/L (ref 7–16)
ANION GAP SERPL CALCULATED.3IONS-SCNC: 21 MMOL/L (ref 7–16)
AST SERPL-CCNC: 32 U/L (ref 0–39)
AST SERPL-CCNC: 34 U/L (ref 0–39)
AST SERPL-CCNC: 42 U/L (ref 0–39)
BASOPHILS # BLD: 0 K/UL (ref 0–0.2)
BASOPHILS NFR BLD: 0 % (ref 0–2)
BILIRUB SERPL-MCNC: 0.2 MG/DL (ref 0–1.2)
BUN SERPL-MCNC: 105 MG/DL (ref 6–20)
BUN SERPL-MCNC: 108 MG/DL (ref 6–20)
BUN SERPL-MCNC: 128 MG/DL (ref 6–20)
BUN SERPL-MCNC: 144 MG/DL (ref 6–20)
BUN SERPL-MCNC: 94 MG/DL (ref 6–20)
CALCIUM SERPL-MCNC: 7.3 MG/DL (ref 8.6–10.2)
CALCIUM SERPL-MCNC: 7.7 MG/DL (ref 8.6–10.2)
CALCIUM SERPL-MCNC: 8.3 MG/DL (ref 8.6–10.2)
CALCIUM SERPL-MCNC: 8.6 MG/DL (ref 8.6–10.2)
CALCIUM SERPL-MCNC: 8.6 MG/DL (ref 8.6–10.2)
CHLORIDE SERPL-SCNC: 102 MMOL/L (ref 98–107)
CHLORIDE SERPL-SCNC: 103 MMOL/L (ref 98–107)
CHLORIDE SERPL-SCNC: 105 MMOL/L (ref 98–107)
CHLORIDE SERPL-SCNC: 107 MMOL/L (ref 98–107)
CHLORIDE SERPL-SCNC: 95 MMOL/L (ref 98–107)
CK SERPL-CCNC: 3149 U/L (ref 20–200)
CK SERPL-CCNC: 4013 U/L (ref 20–200)
CK SERPL-CCNC: 5382 U/L (ref 20–200)
CO2 SERPL-SCNC: 21 MMOL/L (ref 22–29)
CO2 SERPL-SCNC: 23 MMOL/L (ref 22–29)
CO2 SERPL-SCNC: 24 MMOL/L (ref 22–29)
CREAT SERPL-MCNC: 2.8 MG/DL (ref 0.7–1.2)
CREAT SERPL-MCNC: 3.3 MG/DL (ref 0.7–1.2)
CREAT SERPL-MCNC: 3.7 MG/DL (ref 0.7–1.2)
CREAT SERPL-MCNC: 4.7 MG/DL (ref 0.7–1.2)
CREAT SERPL-MCNC: 6.5 MG/DL (ref 0.7–1.2)
EOSINOPHIL # BLD: 0 K/UL (ref 0.05–0.5)
EOSINOPHILS RELATIVE PERCENT: 0 % (ref 0–6)
ERYTHROCYTE [DISTWIDTH] IN BLOOD BY AUTOMATED COUNT: 14.6 % (ref 11.5–15)
GFR SERPL CREATININE-BSD FRML MDRD: 10 ML/MIN/1.73M2
GFR SERPL CREATININE-BSD FRML MDRD: 14 ML/MIN/1.73M2
GFR SERPL CREATININE-BSD FRML MDRD: 19 ML/MIN/1.73M2
GFR SERPL CREATININE-BSD FRML MDRD: 22 ML/MIN/1.73M2
GFR SERPL CREATININE-BSD FRML MDRD: 27 ML/MIN/1.73M2
GLUCOSE SERPL-MCNC: 103 MG/DL (ref 74–99)
GLUCOSE SERPL-MCNC: 108 MG/DL (ref 74–99)
GLUCOSE SERPL-MCNC: 108 MG/DL (ref 74–99)
GLUCOSE SERPL-MCNC: 127 MG/DL (ref 74–99)
GLUCOSE SERPL-MCNC: 128 MG/DL (ref 74–99)
HCT VFR BLD AUTO: 31.8 % (ref 37–54)
HGB BLD-MCNC: 11.1 G/DL (ref 12.5–16.5)
LYMPHOCYTES NFR BLD: 0.56 K/UL (ref 1.5–4)
LYMPHOCYTES RELATIVE PERCENT: 4 % (ref 20–42)
MAGNESIUM SERPL-MCNC: 2 MG/DL (ref 1.6–2.6)
MAGNESIUM SERPL-MCNC: 2 MG/DL (ref 1.6–2.6)
MAGNESIUM SERPL-MCNC: 2.1 MG/DL (ref 1.6–2.6)
MAGNESIUM SERPL-MCNC: 2.3 MG/DL (ref 1.6–2.6)
MCH RBC QN AUTO: 29.3 PG (ref 26–35)
MCHC RBC AUTO-ENTMCNC: 34.9 G/DL (ref 32–34.5)
MCV RBC AUTO: 83.9 FL (ref 80–99.9)
MICROORGANISM SPEC CULT: NO GROWTH
MONOCYTES NFR BLD: 1.89 K/UL (ref 0.1–0.95)
MONOCYTES NFR BLD: 15 % (ref 2–12)
NEUTROPHILS NFR BLD: 81 % (ref 43–80)
NEUTS SEG NFR BLD: 10.25 K/UL (ref 1.8–7.3)
PHOSPHATE SERPL-MCNC: 5.2 MG/DL (ref 2.5–4.5)
PLATELET # BLD AUTO: 143 K/UL (ref 130–450)
PMV BLD AUTO: 12.1 FL (ref 7–12)
POTASSIUM SERPL-SCNC: 2.9 MMOL/L (ref 3.5–5)
POTASSIUM SERPL-SCNC: 3 MMOL/L (ref 3.5–5)
POTASSIUM SERPL-SCNC: 3.4 MMOL/L (ref 3.5–5)
POTASSIUM SERPL-SCNC: 3.5 MMOL/L (ref 3.5–5)
POTASSIUM SERPL-SCNC: 3.5 MMOL/L (ref 3.5–5)
PROT SERPL-MCNC: 6.3 G/DL (ref 6.4–8.3)
PROT SERPL-MCNC: 6.5 G/DL (ref 6.4–8.3)
PROT SERPL-MCNC: 7 G/DL (ref 6.4–8.3)
RBC # BLD AUTO: 3.79 M/UL (ref 3.8–5.8)
RBC # BLD: ABNORMAL 10*6/UL
SODIUM SERPL-SCNC: 139 MMOL/L (ref 132–146)
SODIUM SERPL-SCNC: 141 MMOL/L (ref 132–146)
SODIUM SERPL-SCNC: 142 MMOL/L (ref 132–146)
SODIUM SERPL-SCNC: 145 MMOL/L (ref 132–146)
SODIUM SERPL-SCNC: 147 MMOL/L (ref 132–146)
SPECIMEN DESCRIPTION: NORMAL
WBC OTHER # BLD: 12.7 K/UL (ref 4.5–11.5)

## 2023-09-21 PROCEDURE — 6370000000 HC RX 637 (ALT 250 FOR IP)

## 2023-09-21 PROCEDURE — 2500000003 HC RX 250 WO HCPCS

## 2023-09-21 PROCEDURE — 36415 COLL VENOUS BLD VENIPUNCTURE: CPT

## 2023-09-21 PROCEDURE — 6360000002 HC RX W HCPCS: Performed by: INTERNAL MEDICINE

## 2023-09-21 PROCEDURE — 2580000003 HC RX 258

## 2023-09-21 PROCEDURE — 84100 ASSAY OF PHOSPHORUS: CPT

## 2023-09-21 PROCEDURE — 83735 ASSAY OF MAGNESIUM: CPT

## 2023-09-21 PROCEDURE — 80048 BASIC METABOLIC PNL TOTAL CA: CPT

## 2023-09-21 PROCEDURE — 2140000000 HC CCU INTERMEDIATE R&B

## 2023-09-21 PROCEDURE — 85025 COMPLETE CBC W/AUTO DIFF WBC: CPT

## 2023-09-21 PROCEDURE — 82550 ASSAY OF CK (CPK): CPT

## 2023-09-21 PROCEDURE — 80053 COMPREHEN METABOLIC PANEL: CPT

## 2023-09-21 PROCEDURE — 6360000002 HC RX W HCPCS

## 2023-09-21 PROCEDURE — 99232 SBSQ HOSP IP/OBS MODERATE 35: CPT | Performed by: FAMILY MEDICINE

## 2023-09-21 RX ADMIN — POTASSIUM CHLORIDE AND SODIUM CHLORIDE: 900; 300 INJECTION, SOLUTION INTRAVENOUS at 05:30

## 2023-09-21 RX ADMIN — AMLODIPINE BESYLATE 5 MG: 5 TABLET ORAL at 10:28

## 2023-09-21 RX ADMIN — ACETAMINOPHEN 650 MG: 325 TABLET ORAL at 12:00

## 2023-09-21 RX ADMIN — Medication 100 MG: at 10:28

## 2023-09-21 RX ADMIN — MICONAZOLE NITRATE: 20.6 POWDER TOPICAL at 20:08

## 2023-09-21 RX ADMIN — POTASSIUM CHLORIDE AND SODIUM CHLORIDE: 900; 300 INJECTION, SOLUTION INTRAVENOUS at 15:47

## 2023-09-21 RX ADMIN — PANTOPRAZOLE SODIUM 40 MG: 40 TABLET, DELAYED RELEASE ORAL at 10:27

## 2023-09-21 RX ADMIN — SODIUM CHLORIDE, PRESERVATIVE FREE 10 ML: 5 INJECTION INTRAVENOUS at 20:04

## 2023-09-21 RX ADMIN — HEPARIN SODIUM 5000 UNITS: 10000 INJECTION INTRAVENOUS; SUBCUTANEOUS at 17:53

## 2023-09-21 RX ADMIN — HEPARIN SODIUM 5000 UNITS: 10000 INJECTION INTRAVENOUS; SUBCUTANEOUS at 01:15

## 2023-09-21 RX ADMIN — Medication 1 MG: at 10:28

## 2023-09-21 RX ADMIN — ACETAMINOPHEN 650 MG: 325 TABLET ORAL at 20:04

## 2023-09-21 RX ADMIN — HEPARIN SODIUM 5000 UNITS: 10000 INJECTION INTRAVENOUS; SUBCUTANEOUS at 09:35

## 2023-09-21 ASSESSMENT — PAIN DESCRIPTION - DESCRIPTORS
DESCRIPTORS: ACHING;DISCOMFORT;SORE
DESCRIPTORS: ACHING;DISCOMFORT;SORE

## 2023-09-21 ASSESSMENT — PAIN - FUNCTIONAL ASSESSMENT
PAIN_FUNCTIONAL_ASSESSMENT: PREVENTS OR INTERFERES WITH ALL ACTIVE AND SOME PASSIVE ACTIVITIES
PAIN_FUNCTIONAL_ASSESSMENT: PREVENTS OR INTERFERES SOME ACTIVE ACTIVITIES AND ADLS

## 2023-09-21 ASSESSMENT — PAIN DESCRIPTION - FREQUENCY: FREQUENCY: CONTINUOUS

## 2023-09-21 ASSESSMENT — PAIN DESCRIPTION - LOCATION
LOCATION: GENERALIZED
LOCATION: GENERALIZED

## 2023-09-21 ASSESSMENT — PAIN SCALES - GENERAL
PAINLEVEL_OUTOF10: 6
PAINLEVEL_OUTOF10: 8
PAINLEVEL_OUTOF10: 10

## 2023-09-21 ASSESSMENT — PAIN DESCRIPTION - ONSET: ONSET: ON-GOING

## 2023-09-21 ASSESSMENT — PAIN DESCRIPTION - PAIN TYPE: TYPE: ACUTE PAIN

## 2023-09-21 NOTE — CARE COORDINATION
9/21/23  Transition of care update. Patient admitted for acute renal failure, acute kidney injury and traumatic    Rhabdomyolysis . Patient is  on IV fluids. UA  and UDS are pending. Patient is on 2 liters of 02 with attempts to wean as patient does not require 02 in the community. Nephrology is following and Azotemia is improving . Patient had a bedside swallow and passed. Patient is from home and is independent with ADL's. Discharge goal is to return home with no needs when medically stable. SW/DIPAK to follow.     Electronically signed by DEMARIO Gatica on 9/21/2023 at 2:20 PM

## 2023-09-21 NOTE — PLAN OF CARE
Problem: Discharge Planning  Goal: Discharge to home or other facility with appropriate resources  Outcome: Progressing     Problem: Safety - Adult  Goal: Free from fall injury  Outcome: Progressing     Problem: Pain  Goal: Verbalizes/displays adequate comfort level or baseline comfort level  Outcome: Progressing     Problem: Skin/Tissue Integrity  Goal: Absence of new skin breakdown  Description: 1. Monitor for areas of redness and/or skin breakdown  2. Assess vascular access sites hourly  3. Every 4-6 hours minimum:  Change oxygen saturation probe site  4. Every 4-6 hours:  If on nasal continuous positive airway pressure, respiratory therapy assess nares and determine need for appliance change or resting period.   Outcome: Progressing     Problem: Nutrition Deficit:  Goal: Optimize nutritional status  Outcome: Progressing

## 2023-09-21 NOTE — PROGRESS NOTES
Physician Progress Note      PATIENT:               Shara Barnes  [facility] #:                  [id number]  :                       1970  ADMIT DATE:       2023 5:34 PM  DISCH DATE:  Zamzam Mishra  PROVIDER #:        Lakeishajedkayla Danna BADILLO          QUERY TEXT:    Pt admitted with PAULINA. Pt noted to have Rhabdomyolysis and CK of 11,960. If   possible, please document in progress notes and discharge summary if you are   evaluating and/or treating any of the following: The medical record reflects the following:  Risk Factors: Multiple falls over past few days; Found on floor at home  Clinical Indicators:  23: Per ED: Found down on floor by family and was there for 2 days. multiple falls in past few day. Per H&P: PAULINA; Rhabdomyolysis  23: 80586;  creatinine 14.9; Treatment: IV fluids with sodium bicarb; IV fluid bolus X 2;  laboratory   studies; Thank Antony BAE, R.N.  Clinical Documentation Improvement  [phone]      Per [email or web address]  Traumatic rhabdomyolysis cause examples: crush syndrome, prolonged   immobilization  Nontraumatic rhabdomyolysis cause examples:  marked exertion, hyperthermia,   metabolic myopathy, drugs or toxins, infections, electrolyte disorders. Options provided:  -- Traumatic rhabdomyolysis  -- Nontraumatic rhabdomyolysis  -- Other - I will add my own diagnosis  -- Disagree - Not applicable / Not valid  -- Disagree - Clinically unable to determine / Unknown  -- Refer to Clinical Documentation Reviewer    PROVIDER RESPONSE TEXT:    This patient has nontraumatic rhabdomyolysis. Query created by: Dawit Duval on 2023 10:47 AM      QUERY TEXT:    Patient admitted with PAULINA. Noted documentation of Acute Hypoxic Respiratory   failure in 23 . In order to support the diagnosis of acute respiratory   failure, please include additional clinical indicators in your documentation.     Or please document

## 2023-09-21 NOTE — PLAN OF CARE
Problem: Discharge Planning  Goal: Discharge to home or other facility with appropriate resources  9/21/2023 0437 by Sushant Forrester RN  Outcome: Progressing  9/20/2023 1658 by Judie Galeana RN  Outcome: Progressing     Problem: Safety - Adult  Goal: Free from fall injury  9/21/2023 0437 by Sushant Forrester RN  Outcome: Progressing  9/20/2023 1658 by Judie Galeana RN  Outcome: Progressing     Problem: Pain  Goal: Verbalizes/displays adequate comfort level or baseline comfort level  9/21/2023 0437 by Sushant Forrester RN  Outcome: Progressing  9/20/2023 1658 by Judie Galeana RN  Outcome: Progressing     Problem: Skin/Tissue Integrity  Goal: Absence of new skin breakdown  Description: 1. Monitor for areas of redness and/or skin breakdown  2. Assess vascular access sites hourly  3. Every 4-6 hours minimum:  Change oxygen saturation probe site  4. Every 4-6 hours:  If on nasal continuous positive airway pressure, respiratory therapy assess nares and determine need for appliance change or resting period.   9/21/2023 0437 by Sushant Forerster RN  Outcome: Progressing  9/20/2023 1658 by Judie Galeana RN  Outcome: Progressing

## 2023-09-21 NOTE — PROGRESS NOTES
P Quality Flow/Interdisciplinary Rounds Progress Note        Quality Flow Rounds held on September 21, 2023    Disciplines Attending:  Bedside Nurse, , , and Nursing Unit Leadership    Hiren Kinney was admitted on 9/19/2023  5:34 PM    Anticipated Discharge Date:       Disposition:    Gordon Score:  Gordon Scale Score: 14    Readmission Risk              Risk of Unplanned Readmission:  15           Discussed patient goal for the day, patient clinical progression, and barriers to discharge.   The following Goal(s) of the Day/Commitment(s) have been identified:  Labs - Report Results      Felicity Bravo RN  September 21, 2023

## 2023-09-21 NOTE — PROGRESS NOTES
Notified Husam Sanchez via [employer] about critical K of 2.5 and questioned if anything was to be added at this time. Ordered to recheck K at midnight. Magnus Jean RN

## 2023-09-21 NOTE — PROGRESS NOTES
[facility] MD Pattie Justin MD. Gisella Lundberg MD   Progress Note    9/21/2023    SUBJECTIVE:   Seen in his room comfortable on o2/nc   PROBLEM LIST:    Principal Problem:    Acute kidney injury ([address])  Active Problems:    Acute renal failure ([address])  Resolved Problems:    * No resolved hospital problems. *       DIET:    Diet NPO       Allergies : Medrol [methylprednisolone]    Past Medical History:   Diagnosis Date    Alcohol use     Essential hypertension 04/06/2020    Lumbar radiculopathy 04/06/2020       No past surgical history on file. Family History   Problem Relation Age of Onset    Cancer Mother     Asthma Mother     Hypertension Father         reports that he has never smoked. He has never used smokeless tobacco. He reports current alcohol use of about 5.0 standard drinks of alcohol per week. He reports that he does not use drugs. Review of Systems:   Constitutional: no fevers , no chills , feels ok   Eyes: no eye pain , no itching , no drainage  Ears, nose, mouth, throat, and face: no ear ,nose pain , hearing is ok ,no nasal drainage   Respiratory: no sob ,no cough ,no wheezing . Cardiovascular: no chest pain , no palpitation ,no sob . Gastrointestinal: no nausea, vomiting , constipation , no abdominal pain . Genitourinary:no urinary retention , no burning , dysuria . No polyuria   Hematologic/lymphatic: no bleeding , no cougulation issues . Musculoskeletal:no joint pain , no swelling . Neurological: no headaches ,no weakness , no numbness . Endocrine: no thirst , no weight issues .      MEDS (scheduled):    [Held by provider] amitriptyline  25 mg Oral Nightly    amLODIPine  5 mg Oral Daily    [Held by provider] gabapentin  300 mg Oral BID    pantoprazole  40 mg Oral Daily with breakfast    [Held by provider] sertraline  100 mg Oral Daily    sodium chloride flush  5-40 mL IntraVENous 2 times per day    heparin (porcine)  5,000 Units SubCUTAneous nontender, nondistended, NABS  Ext: no edema, feet warm  Skin: dry, no rash  Neuro: awake, alert, interactive      DATA:    Recent Labs     09/19/23 1756 09/21/23  0343   WBC 19.1* 12.7*   HGB 13.9 11.1*   HCT 39.2 31.8*   MCV 83.1 83.9    143     Recent Labs     09/19/23  1756 09/20/23  0449 09/20/23  0606 09/20/23  0630 09/20/23  1904 09/20/23  2252 09/21/23  0343 09/21/23  0544      < > 135   < > 136 139  --  145   K 3.9   < > 2.6*   < > 2.5* 2.9*  --  3.0*   CL 77*   < > 87*   < > 92* 95*  --  103   CO2 14*   < > 21*   < > 23 24  --  21*   MG 2.4  --  1.9   < > 2.2 2.0 2.0 2.1   PHOS  --   --  6.2*  --   --   --  5.2*  --    *   < > 176*   < > 148* 144*  --  128*   CREATININE 14.9*   < > 11.7*   < > 7.6* 6.5*  --  4.7*   ALT 51*   < > 35   < > 36 34  --  33   AST 75*   < > 48*   < > 45* 42*  --  34   BILIDIR <0.2  --   --   --   --   --   --   --    BILITOT 0.3   < > 0.2   < > 0.2 0.2  --  0.2   ALKPHOS 87   < > 63   < > 102 137*  --  74    < > = values in this interval not displayed. No results found for: \"LABPROT\"    ASSESSMENT / RECOMMENDATIONS:      1. Acute kidney injury appeared to be mainly related to decreased  effective volume. With this degree of azotemia, cannot rule out  underlying ATN. His CT scan of the abdomen showing no hydronephrosis. He has a Barrientos in a place with okay urine output. At this point, I  would continue IV fluid resuscitation and followup serial basic  metabolic panel and urine output. I will check urinalysis along with  spot urine studies to quantify presence of proteinuria. Continue to  hold his nephrotoxic medication at home including Zestril and  hydrochlorothiazide, which might have exacerbated his underlying PAULINA. 2.  Mild rhabdomyolysis. 3.  History of alcoholism. 4.  Hypocalcemia would be gentle and replacement.       Plan :   Azotemia improving   K up to 3   Bp stable    Continue NS with 40 meq kcl at 125 cc/hr  Bmp q 6hr   Avoid

## 2023-09-21 NOTE — PROGRESS NOTES
Comprehensive Nutrition Assessment    Type and Reason for Visit:  Initial, Consult, Wound    Nutrition Recommendations/Plan:   Recommend and start Magic cup supplement BID and Papito wound healing supplement BID to help meet increased nutritional needs from wound healing. Monitor phosphorus levels and adjust ONS and diet as needed. Malnutrition Assessment:  Malnutrition Status: At risk for malnutrition (Comment) (09/21/23 1029)    Context:  Acute Illness     Findings of the 6 clinical characteristics of malnutrition:  Energy Intake:  Mild decrease in energy intake (Comment) (since admission)  Weight Loss:  Unable to assess (d/t possible fluid shifts)     Body Fat Loss:  Unable to assess     Muscle Mass Loss:  Unable to assess    Fluid Accumulation:  No significant fluid accumulation     Strength:  Not Performed    Nutrition Assessment:    Patient adm w/ AMS (complaints of weakness and being found on the floor) ; recent falls ; noted mild rhabdomyolysis and metabolic encephalopathy (ammonia level 31) ; noted PAULINA ; hx of ETOH abuse ; wound noted ; will start ONS    Nutrition Related Findings:    -I&Os (-4.4 L), 3+ edema, diarrhea, elevated phosphorus ; Wound Type: Multiple, Open Wounds, Skin Tears, Wound Consult Pending (wound x 1 noted to R foot ; noted abrasions and blisters)       Current Nutrition Intake & Therapies:    Average Meal Intake: 51-75%     ADULT DIET; Regular    Anthropometric Measures:  Height: 5' 8\" (172.7 cm)  Ideal Body Weight (IBW): 154 lbs (70 kg)       Current Body Weight: 160 lb (72.6 kg) (9/21, bedscale), 103.9 % IBW. Weight Source: Bed Scale  Current BMI (kg/m2): 24.3  Usual Body Weight:  (UTO ; EMR shows past weights of 176# actual on 11/23/22 and 168# actual on 8/16/22)                       BMI Categories: Normal Weight (BMI 18.5-24. 9)    Estimated Daily Nutrient Needs:  Energy Requirements Based On: Formula  Weight Used for Energy Requirements: Current  Energy (kcal/day): 6446-5195 (REE 1552 x 1.2 SF)  Weight Used for Protein Requirements: Current  Protein (g/day):  (1.2-1.4g/kg CBW)  Method Used for Fluid Requirements: 1 ml/kcal  Fluid (ml/day): 2504-2561    Nutrition Diagnosis:   Increased nutrient needs related to increase demand for energy/nutrients as evidenced by wounds    Nutrition Interventions:   Food and/or Nutrient Delivery: Continue Current Diet, Start Oral Nutrition Supplement  Nutrition Education/Counseling: Education not indicated  Coordination of Nutrition Care: Continue to monitor while inpatient       Goals:  Previous Goal Met: Progressing toward Goal(s)  Goals: Meet at least 75% of estimated needs, by next RD assessment       Nutrition Monitoring and Evaluation:   Behavioral-Environmental Outcomes: None Identified  Food/Nutrient Intake Outcomes: Food and Nutrient Intake, Supplement Intake  Physical Signs/Symptoms Outcomes: Biochemical Data, Chewing or Swallowing, GI Status, Diarrhea, Fluid Status or Edema, Hemodynamic Status, Meal Time Behavior, Nutrition Focused Physical Findings, Skin, Weight    Discharge Planning:     Too soon to determine     Alexia Dimas, RD, LD  Contact: [phone]

## 2023-09-22 LAB
ALBUMIN SERPL-MCNC: 2.6 G/DL (ref 3.5–5.2)
ALBUMIN SERPL-MCNC: 2.6 G/DL (ref 3.5–5.2)
ALBUMIN SERPL-MCNC: 2.7 G/DL (ref 3.5–5.2)
ALBUMIN SERPL-MCNC: 2.8 G/DL (ref 3.5–5.2)
ALBUMIN SERPL-MCNC: 2.8 G/DL (ref 3.5–5.2)
ALP SERPL-CCNC: 101 U/L (ref 40–129)
ALP SERPL-CCNC: 65 U/L (ref 40–129)
ALP SERPL-CCNC: 66 U/L (ref 40–129)
ALP SERPL-CCNC: 68 U/L (ref 40–129)
ALP SERPL-CCNC: 68 U/L (ref 40–129)
ALT SERPL-CCNC: 30 U/L (ref 0–40)
ALT SERPL-CCNC: 31 U/L (ref 0–40)
ALT SERPL-CCNC: 32 U/L (ref 0–40)
ANION GAP SERPL CALCULATED.3IONS-SCNC: 11 MMOL/L (ref 7–16)
ANION GAP SERPL CALCULATED.3IONS-SCNC: 11 MMOL/L (ref 7–16)
ANION GAP SERPL CALCULATED.3IONS-SCNC: 12 MMOL/L (ref 7–16)
ANION GAP SERPL CALCULATED.3IONS-SCNC: 12 MMOL/L (ref 7–16)
ANION GAP SERPL CALCULATED.3IONS-SCNC: 13 MMOL/L (ref 7–16)
ANION GAP SERPL CALCULATED.3IONS-SCNC: 9 MMOL/L (ref 7–16)
AST SERPL-CCNC: 27 U/L (ref 0–39)
AST SERPL-CCNC: 29 U/L (ref 0–39)
AST SERPL-CCNC: 29 U/L (ref 0–39)
AST SERPL-CCNC: 31 U/L (ref 0–39)
AST SERPL-CCNC: 33 U/L (ref 0–39)
BASOPHILS # BLD: 0 K/UL (ref 0–0.2)
BASOPHILS NFR BLD: 0 % (ref 0–2)
BILIRUB SERPL-MCNC: 0.2 MG/DL (ref 0–1.2)
BUN SERPL-MCNC: 51 MG/DL (ref 6–20)
BUN SERPL-MCNC: 52 MG/DL (ref 6–20)
BUN SERPL-MCNC: 59 MG/DL (ref 6–20)
BUN SERPL-MCNC: 59 MG/DL (ref 6–20)
BUN SERPL-MCNC: 60 MG/DL (ref 6–20)
BUN SERPL-MCNC: 70 MG/DL (ref 6–20)
BUN SERPL-MCNC: 75 MG/DL (ref 6–20)
BUN SERPL-MCNC: 84 MG/DL (ref 6–20)
CALCIUM SERPL-MCNC: 8.8 MG/DL (ref 8.6–10.2)
CALCIUM SERPL-MCNC: 8.9 MG/DL (ref 8.6–10.2)
CALCIUM SERPL-MCNC: 9 MG/DL (ref 8.6–10.2)
CALCIUM SERPL-MCNC: 9 MG/DL (ref 8.6–10.2)
CALCIUM SERPL-MCNC: 9.1 MG/DL (ref 8.6–10.2)
CALCIUM SERPL-MCNC: 9.2 MG/DL (ref 8.6–10.2)
CHLORIDE SERPL-SCNC: 111 MMOL/L (ref 98–107)
CHLORIDE SERPL-SCNC: 113 MMOL/L (ref 98–107)
CHLORIDE SERPL-SCNC: 114 MMOL/L (ref 98–107)
CHLORIDE SERPL-SCNC: 115 MMOL/L (ref 98–107)
CK SERPL-CCNC: 1010 U/L (ref 20–200)
CK SERPL-CCNC: 1146 U/L (ref 20–200)
CK SERPL-CCNC: 1179 U/L (ref 20–200)
CK SERPL-CCNC: 1616 U/L (ref 20–200)
CK SERPL-CCNC: 1974 U/L (ref 20–200)
CO2 SERPL-SCNC: 22 MMOL/L (ref 22–29)
CO2 SERPL-SCNC: 23 MMOL/L (ref 22–29)
CO2 SERPL-SCNC: 24 MMOL/L (ref 22–29)
CO2 SERPL-SCNC: 24 MMOL/L (ref 22–29)
CO2 SERPL-SCNC: 25 MMOL/L (ref 22–29)
CREAT SERPL-MCNC: 1.5 MG/DL (ref 0.7–1.2)
CREAT SERPL-MCNC: 1.5 MG/DL (ref 0.7–1.2)
CREAT SERPL-MCNC: 1.6 MG/DL (ref 0.7–1.2)
CREAT SERPL-MCNC: 1.6 MG/DL (ref 0.7–1.2)
CREAT SERPL-MCNC: 1.7 MG/DL (ref 0.7–1.2)
CREAT SERPL-MCNC: 2 MG/DL (ref 0.7–1.2)
CREAT SERPL-MCNC: 2.1 MG/DL (ref 0.7–1.2)
CREAT SERPL-MCNC: 2.5 MG/DL (ref 0.7–1.2)
EOSINOPHIL # BLD: 0 K/UL (ref 0.05–0.5)
EOSINOPHILS RELATIVE PERCENT: 0 % (ref 0–6)
ERYTHROCYTE [DISTWIDTH] IN BLOOD BY AUTOMATED COUNT: 15.5 % (ref 11.5–15)
GFR SERPL CREATININE-BSD FRML MDRD: 31 ML/MIN/1.73M2
GFR SERPL CREATININE-BSD FRML MDRD: 37 ML/MIN/1.73M2
GFR SERPL CREATININE-BSD FRML MDRD: 40 ML/MIN/1.73M2
GFR SERPL CREATININE-BSD FRML MDRD: 49 ML/MIN/1.73M2
GFR SERPL CREATININE-BSD FRML MDRD: 50 ML/MIN/1.73M2
GFR SERPL CREATININE-BSD FRML MDRD: 50 ML/MIN/1.73M2
GFR SERPL CREATININE-BSD FRML MDRD: 54 ML/MIN/1.73M2
GFR SERPL CREATININE-BSD FRML MDRD: 55 ML/MIN/1.73M2
GLUCOSE SERPL-MCNC: 124 MG/DL (ref 74–99)
GLUCOSE SERPL-MCNC: 136 MG/DL (ref 74–99)
GLUCOSE SERPL-MCNC: 141 MG/DL (ref 74–99)
GLUCOSE SERPL-MCNC: 141 MG/DL (ref 74–99)
GLUCOSE SERPL-MCNC: 144 MG/DL (ref 74–99)
GLUCOSE SERPL-MCNC: 161 MG/DL (ref 74–99)
GLUCOSE SERPL-MCNC: 230 MG/DL (ref 74–99)
GLUCOSE SERPL-MCNC: 234 MG/DL (ref 74–99)
HCT VFR BLD AUTO: 32.8 % (ref 37–54)
HGB BLD-MCNC: 10.9 G/DL (ref 12.5–16.5)
LYMPHOCYTES NFR BLD: 0.89 K/UL (ref 1.5–4)
LYMPHOCYTES RELATIVE PERCENT: 9 % (ref 20–42)
MCH RBC QN AUTO: 29.2 PG (ref 26–35)
MCHC RBC AUTO-ENTMCNC: 33.2 G/DL (ref 32–34.5)
MCV RBC AUTO: 87.9 FL (ref 80–99.9)
METAMYELOCYTES ABSOLUTE COUNT: 0.09 K/UL (ref 0–0.12)
METAMYELOCYTES: 1 % (ref 0–1)
MONOCYTES NFR BLD: 0.71 K/UL (ref 0.1–0.95)
MONOCYTES NFR BLD: 7 % (ref 2–12)
MYELOCYTES ABSOLUTE COUNT: 0.18 K/UL
MYELOCYTES: 2 %
NEUTROPHILS NFR BLD: 82 % (ref 43–80)
NEUTS SEG NFR BLD: 8.34 K/UL (ref 1.8–7.3)
PLATELET # BLD AUTO: 153 K/UL (ref 130–450)
PMV BLD AUTO: 11.4 FL (ref 7–12)
POTASSIUM SERPL-SCNC: 3.9 MMOL/L (ref 3.5–5)
POTASSIUM SERPL-SCNC: 4 MMOL/L (ref 3.5–5)
POTASSIUM SERPL-SCNC: 4.1 MMOL/L (ref 3.5–5)
POTASSIUM SERPL-SCNC: 4.2 MMOL/L (ref 3.5–5)
PROT SERPL-MCNC: 6.5 G/DL (ref 6.4–8.3)
PROT SERPL-MCNC: 6.5 G/DL (ref 6.4–8.3)
PROT SERPL-MCNC: 6.6 G/DL (ref 6.4–8.3)
PROT SERPL-MCNC: 6.6 G/DL (ref 6.4–8.3)
PROT SERPL-MCNC: 6.8 G/DL (ref 6.4–8.3)
RBC # BLD AUTO: 3.73 M/UL (ref 3.8–5.8)
RBC # BLD: ABNORMAL 10*6/UL
SODIUM SERPL-SCNC: 147 MMOL/L (ref 132–146)
SODIUM SERPL-SCNC: 148 MMOL/L (ref 132–146)
SODIUM SERPL-SCNC: 148 MMOL/L (ref 132–146)
SODIUM SERPL-SCNC: 149 MMOL/L (ref 132–146)
SODIUM SERPL-SCNC: 150 MMOL/L (ref 132–146)
SODIUM SERPL-SCNC: 152 MMOL/L (ref 132–146)
WBC OTHER # BLD: 10.2 K/UL (ref 4.5–11.5)

## 2023-09-22 PROCEDURE — 82550 ASSAY OF CK (CPK): CPT

## 2023-09-22 PROCEDURE — 6360000002 HC RX W HCPCS

## 2023-09-22 PROCEDURE — 99232 SBSQ HOSP IP/OBS MODERATE 35: CPT | Performed by: FAMILY MEDICINE

## 2023-09-22 PROCEDURE — 80053 COMPREHEN METABOLIC PANEL: CPT

## 2023-09-22 PROCEDURE — 2500000003 HC RX 250 WO HCPCS: Performed by: INTERNAL MEDICINE

## 2023-09-22 PROCEDURE — 36415 COLL VENOUS BLD VENIPUNCTURE: CPT

## 2023-09-22 PROCEDURE — 85025 COMPLETE CBC W/AUTO DIFF WBC: CPT

## 2023-09-22 PROCEDURE — 80048 BASIC METABOLIC PNL TOTAL CA: CPT

## 2023-09-22 PROCEDURE — 2140000000 HC CCU INTERMEDIATE R&B

## 2023-09-22 PROCEDURE — 6360000002 HC RX W HCPCS: Performed by: INTERNAL MEDICINE

## 2023-09-22 PROCEDURE — 2580000003 HC RX 258

## 2023-09-22 PROCEDURE — 6370000000 HC RX 637 (ALT 250 FOR IP)

## 2023-09-22 RX ORDER — LABETALOL HYDROCHLORIDE 5 MG/ML
10 INJECTION, SOLUTION INTRAVENOUS EVERY 4 HOURS PRN
Status: DISCONTINUED | OUTPATIENT
Start: 2023-09-22 | End: 2023-09-29 | Stop reason: HOSPADM

## 2023-09-22 RX ORDER — DEXTROSE MONOHYDRATE, SODIUM CHLORIDE, AND POTASSIUM CHLORIDE 50; 1.49; 4.5 G/1000ML; G/1000ML; G/1000ML
INJECTION, SOLUTION INTRAVENOUS CONTINUOUS
Status: DISCONTINUED | OUTPATIENT
Start: 2023-09-22 | End: 2023-09-23

## 2023-09-22 RX ADMIN — HEPARIN SODIUM 5000 UNITS: 10000 INJECTION INTRAVENOUS; SUBCUTANEOUS at 09:00

## 2023-09-22 RX ADMIN — HEPARIN SODIUM 5000 UNITS: 10000 INJECTION INTRAVENOUS; SUBCUTANEOUS at 00:07

## 2023-09-22 RX ADMIN — AMLODIPINE BESYLATE 5 MG: 5 TABLET ORAL at 08:59

## 2023-09-22 RX ADMIN — ACETAMINOPHEN 650 MG: 325 TABLET ORAL at 16:29

## 2023-09-22 RX ADMIN — POTASSIUM CHLORIDE AND SODIUM CHLORIDE: 900; 300 INJECTION, SOLUTION INTRAVENOUS at 00:11

## 2023-09-22 RX ADMIN — Medication 100 MG: at 08:59

## 2023-09-22 RX ADMIN — Medication 1 MG: at 08:59

## 2023-09-22 RX ADMIN — HEPARIN SODIUM 5000 UNITS: 10000 INJECTION INTRAVENOUS; SUBCUTANEOUS at 16:55

## 2023-09-22 RX ADMIN — POTASSIUM CHLORIDE, DEXTROSE MONOHYDRATE AND SODIUM CHLORIDE: 150; 5; 450 INJECTION, SOLUTION INTRAVENOUS at 12:36

## 2023-09-22 RX ADMIN — ACETAMINOPHEN 650 MG: 325 TABLET ORAL at 02:15

## 2023-09-22 RX ADMIN — PANTOPRAZOLE SODIUM 40 MG: 40 TABLET, DELAYED RELEASE ORAL at 08:59

## 2023-09-22 RX ADMIN — MICONAZOLE NITRATE: 20.6 POWDER TOPICAL at 21:20

## 2023-09-22 RX ADMIN — MICONAZOLE NITRATE: 20.6 POWDER TOPICAL at 10:12

## 2023-09-22 RX ADMIN — ACETAMINOPHEN 650 MG: 325 TABLET ORAL at 09:08

## 2023-09-22 RX ADMIN — SODIUM CHLORIDE, PRESERVATIVE FREE 10 ML: 5 INJECTION INTRAVENOUS at 08:59

## 2023-09-22 RX ADMIN — POTASSIUM CHLORIDE AND SODIUM CHLORIDE: 900; 300 INJECTION, SOLUTION INTRAVENOUS at 09:00

## 2023-09-22 RX ADMIN — LABETALOL HYDROCHLORIDE 10 MG: 5 INJECTION INTRAVENOUS at 21:26

## 2023-09-22 ASSESSMENT — PAIN DESCRIPTION - ORIENTATION
ORIENTATION: RIGHT;LEFT
ORIENTATION: RIGHT;LEFT
ORIENTATION: MID
ORIENTATION: MID

## 2023-09-22 ASSESSMENT — PAIN DESCRIPTION - LOCATION
LOCATION: GENERALIZED

## 2023-09-22 ASSESSMENT — PAIN DESCRIPTION - PAIN TYPE
TYPE: ACUTE PAIN

## 2023-09-22 ASSESSMENT — PAIN DESCRIPTION - DESCRIPTORS
DESCRIPTORS: ACHING;SORE;PRESSURE
DESCRIPTORS: ACHING;DULL;SHARP;THROBBING
DESCRIPTORS: SORE;DISCOMFORT
DESCRIPTORS: ACHING;SORE;PRESSURE
DESCRIPTORS: SORE;ACHING;DISCOMFORT
DESCRIPTORS: SORE;ACHING;DISCOMFORT

## 2023-09-22 ASSESSMENT — PAIN DESCRIPTION - FREQUENCY
FREQUENCY: CONTINUOUS

## 2023-09-22 ASSESSMENT — PAIN - FUNCTIONAL ASSESSMENT
PAIN_FUNCTIONAL_ASSESSMENT: ACTIVITIES ARE NOT PREVENTED
PAIN_FUNCTIONAL_ASSESSMENT: PREVENTS OR INTERFERES SOME ACTIVE ACTIVITIES AND ADLS
PAIN_FUNCTIONAL_ASSESSMENT: ACTIVITIES ARE NOT PREVENTED
PAIN_FUNCTIONAL_ASSESSMENT: PREVENTS OR INTERFERES SOME ACTIVE ACTIVITIES AND ADLS

## 2023-09-22 ASSESSMENT — PAIN DESCRIPTION - ONSET
ONSET: ON-GOING

## 2023-09-22 ASSESSMENT — PAIN SCALES - GENERAL
PAINLEVEL_OUTOF10: 4
PAINLEVEL_OUTOF10: 8
PAINLEVEL_OUTOF10: 0
PAINLEVEL_OUTOF10: 10
PAINLEVEL_OUTOF10: 9
PAINLEVEL_OUTOF10: 6

## 2023-09-22 NOTE — CARE COORDINATION
9/22/23 Transition of care update. Patient admitted for acute renal failure, acute kidney injury and  traumatic Rhabdomyolysis . Patient is on room air today. Patient Sodium is elevated today as well as BUN and Creatinine. PT/OT evaluations were requested to assess for any post discharge support. Patient is being followed by nephrology. Patient states he was independent with ADL's prior to admit and plans to return home with no needs pending course of treatment. SW/CM to follow.     Electronically signed by DEMARIO Pedraza on 9/22/2023 at 10:29 AM

## 2023-09-22 NOTE — PLAN OF CARE
Problem: Discharge Planning  Goal: Discharge to home or other facility with appropriate resources  9/22/2023 1952 by Isabel Wright RN  Outcome: Progressing  9/22/2023 1140 by Venice Pepper RN  Outcome: Progressing  Flowsheets  Taken 9/22/2023 0732 by Venice Pepper RN  Discharge to home or other facility with appropriate resources: Identify barriers to discharge with patient and caregiver  Taken 9/22/2023 0000 by Isabel Wright RN  Discharge to home or other facility with appropriate resources: Identify barriers to discharge with patient and caregiver     Problem: Safety - Adult  Goal: Free from fall injury  9/22/2023 1952 by Isabel Wright RN  Outcome: Progressing  9/22/2023 1140 by Venice Pepper RN  Outcome: Progressing  Flowsheets (Taken 9/22/2023 0000 by Isabel Wright RN)  Free From Fall Injury: Instruct family/caregiver on patient safety     Problem: Pain  Goal: Verbalizes/displays adequate comfort level or baseline comfort level  Outcome: Progressing     Problem: Skin/Tissue Integrity  Goal: Absence of new skin breakdown  Description: 1. Monitor for areas of redness and/or skin breakdown  2. Assess vascular access sites hourly  3. Every 4-6 hours minimum:  Change oxygen saturation probe site  4. Every 4-6 hours:  If on nasal continuous positive airway pressure, respiratory therapy assess nares and determine need for appliance change or resting period.   9/22/2023 1952 by Isabel Wright RN  Outcome: Progressing  9/22/2023 1140 by Venice Pepper RN  Outcome: Progressing     Problem: Nutrition Deficit:  Goal: Optimize nutritional status  Outcome: Progressing     Problem: ABCDS Injury Assessment  Goal: Absence of physical injury  Outcome: Progressing

## 2023-09-22 NOTE — PLAN OF CARE
Problem: Discharge Planning  Goal: Discharge to home or other facility with appropriate resources  9/22/2023 1140 by Damian Mckeon RN  Outcome: Progressing  Flowsheets  Taken 9/22/2023 0732 by Damian Mckeon RN  Discharge to home or other facility with appropriate resources: Identify barriers to discharge with patient and caregiver  Taken 9/22/2023 0000 by Lieutenant David RN  Discharge to home or other facility with appropriate resources: Identify barriers to discharge with patient and caregiver  9/21/2023 2344 by Lieutenant David RN  Outcome: Progressing     Problem: Safety - Adult  Goal: Free from fall injury  9/22/2023 1140 by Damian Mckeon RN  Outcome: Progressing  Flowsheets (Taken 9/22/2023 0000 by Lieutenant David RN)  Free From Fall Injury: Instruct family/caregiver on patient safety  9/21/2023 2344 by Lieutenant David RN  Outcome: Progressing     Problem: Pain  Goal: Verbalizes/displays adequate comfort level or baseline comfort level  Recent Flowsheet Documentation  Taken 9/22/2023 0330 by Lieutenant David RN  Verbalizes/displays adequate comfort level or baseline comfort level: Encourage patient to monitor pain and request assistance  Taken 9/22/2023 0000 by Lieutenant David RN  Verbalizes/displays adequate comfort level or baseline comfort level: Encourage patient to monitor pain and request assistance  9/21/2023 2344 by Lieutenant David RN  Outcome: Progressing

## 2023-09-22 NOTE — PROGRESS NOTES
has a Barrientos in a place with okay urine output. At this point, I  would continue IV fluid resuscitation and followup serial basic  metabolic panel and urine output. I will check urinalysis along with  spot urine studies to quantify presence of proteinuria. Continue to  hold his nephrotoxic medication at home including Zestril and  hydrochlorothiazide, which might have exacerbated his underlying PAULINA. 2.  Mild rhabdomyolysis. 3.  History of alcoholism. 4.  Hypocalcemia would be gentle and replacement.       Plan :   Azotemia improving   Hyper NA noted   Bp stable    Change iv fluids to 1/2 NS with 20 meq kcl   Fu serial bmps UO   Continue supprotive care       Electronically signed by Gisella Lundberg MD on 9/22/2023 at 12:55 PM

## 2023-09-22 NOTE — PROGRESS NOTES
Message sent to primary resident to clarify mosley catheter. Message sent to [facility] to clarify is mosley is still needed on patient or OK to remove from their standpoint for infection prevention. Per Renal OK to remove mosley from their standpoint. Message sent to primary resident to update.

## 2023-09-22 NOTE — PLAN OF CARE
Problem: Discharge Planning  Goal: Discharge to home or other facility with appropriate resources  9/21/2023 2344 by Candis Walters RN  Outcome: Progressing  9/21/2023 1837 by Brian Burton RN  Outcome: Progressing     Problem: Safety - Adult  Goal: Free from fall injury  9/21/2023 2344 by Candis Walters RN  Outcome: Progressing  9/21/2023 1837 by Brian Burton RN  Outcome: Progressing     Problem: Pain  Goal: Verbalizes/displays adequate comfort level or baseline comfort level  9/21/2023 2344 by Candis Walters RN  Outcome: Progressing  9/21/2023 1837 by Brian Burton RN  Outcome: Progressing     Problem: Skin/Tissue Integrity  Goal: Absence of new skin breakdown  Description: 1. Monitor for areas of redness and/or skin breakdown  2. Assess vascular access sites hourly  3. Every 4-6 hours minimum:  Change oxygen saturation probe site  4. Every 4-6 hours:  If on nasal continuous positive airway pressure, respiratory therapy assess nares and determine need for appliance change or resting period.   9/21/2023 2344 by Candis aWlters RN  Outcome: Progressing  9/21/2023 1837 by Brian Burton RN  Outcome: Progressing     Problem: Nutrition Deficit:  Goal: Optimize nutritional status  9/21/2023 2344 by Candis Walters RN  Outcome: Progressing  9/21/2023 1837 by Brian Burton RN  Outcome: Progressing

## 2023-09-22 NOTE — PATIENT CARE CONFERENCE
P Quality Flow/Interdisciplinary Rounds Progress Note        Quality Flow Rounds held on September 22, 2023    Disciplines Attending:  Bedside Nurse, , , and Nursing Unit Leadership    Diamond Figueroa was admitted on 9/19/2023  5:34 PM    Anticipated Discharge Date:       Disposition:    Gordon Score:  Gordon Scale Score: 16    Readmission Risk              Risk of Unplanned Readmission:  13           Discussed patient goal for the day, patient clinical progression, and barriers to discharge.   The following Goal(s) of the Day/Commitment(s) have been identified:  Labs - Report Results      Bebeto Kahn RN  September 22, 2023

## 2023-09-23 LAB
ANION GAP SERPL CALCULATED.3IONS-SCNC: 11 MMOL/L (ref 7–16)
ANION GAP SERPL CALCULATED.3IONS-SCNC: 7 MMOL/L (ref 7–16)
BASOPHILS # BLD: 0.03 K/UL (ref 0–0.2)
BASOPHILS NFR BLD: 0 % (ref 0–2)
BUN SERPL-MCNC: 36 MG/DL (ref 6–20)
BUN SERPL-MCNC: 38 MG/DL (ref 6–20)
CALCIUM SERPL-MCNC: 8.9 MG/DL (ref 8.6–10.2)
CALCIUM SERPL-MCNC: 8.9 MG/DL (ref 8.6–10.2)
CHLORIDE SERPL-SCNC: 114 MMOL/L (ref 98–107)
CHLORIDE SERPL-SCNC: 114 MMOL/L (ref 98–107)
CO2 SERPL-SCNC: 24 MMOL/L (ref 22–29)
CO2 SERPL-SCNC: 26 MMOL/L (ref 22–29)
CREAT SERPL-MCNC: 1.2 MG/DL (ref 0.7–1.2)
CREAT SERPL-MCNC: 1.3 MG/DL (ref 0.7–1.2)
CRP SERPL HS-MCNC: 140 MG/L (ref 0–5)
EOSINOPHIL # BLD: 0.07 K/UL (ref 0.05–0.5)
EOSINOPHILS RELATIVE PERCENT: 1 % (ref 0–6)
ERYTHROCYTE [DISTWIDTH] IN BLOOD BY AUTOMATED COUNT: 15.7 % (ref 11.5–15)
ERYTHROCYTE [SEDIMENTATION RATE] IN BLOOD BY WESTERGREN METHOD: 13 MM/HR (ref 0–15)
FOLATE SERPL-MCNC: 4.8 NG/ML (ref 4.8–24.2)
GFR SERPL CREATININE-BSD FRML MDRD: >60 ML/MIN/1.73M2
GFR SERPL CREATININE-BSD FRML MDRD: >60 ML/MIN/1.73M2
GLUCOSE SERPL-MCNC: 127 MG/DL (ref 74–99)
GLUCOSE SERPL-MCNC: 137 MG/DL (ref 74–99)
HCT VFR BLD AUTO: 32.1 % (ref 37–54)
HGB BLD-MCNC: 10.3 G/DL (ref 12.5–16.5)
IMM GRANULOCYTES # BLD AUTO: 0.48 K/UL (ref 0–0.58)
IMM GRANULOCYTES NFR BLD: 4 % (ref 0–5)
LYMPHOCYTES NFR BLD: 1.04 K/UL (ref 1.5–4)
LYMPHOCYTES RELATIVE PERCENT: 9 % (ref 20–42)
MCH RBC QN AUTO: 28.8 PG (ref 26–35)
MCHC RBC AUTO-ENTMCNC: 32.1 G/DL (ref 32–34.5)
MCV RBC AUTO: 89.7 FL (ref 80–99.9)
MONOCYTES NFR BLD: 1.43 K/UL (ref 0.1–0.95)
MONOCYTES NFR BLD: 13 % (ref 2–12)
NEUTROPHILS NFR BLD: 73 % (ref 43–80)
NEUTS SEG NFR BLD: 8.28 K/UL (ref 1.8–7.3)
PLATELET, FLUORESCENCE: 153 K/UL (ref 130–450)
PMV BLD AUTO: 11.3 FL (ref 7–12)
POTASSIUM SERPL-SCNC: 4.2 MMOL/L (ref 3.5–5)
POTASSIUM SERPL-SCNC: 4.2 MMOL/L (ref 3.5–5)
PROCALCITONIN SERPL-MCNC: 1.02 NG/ML (ref 0–0.08)
RBC # BLD AUTO: 3.58 M/UL (ref 3.8–5.8)
SODIUM SERPL-SCNC: 147 MMOL/L (ref 132–146)
SODIUM SERPL-SCNC: 149 MMOL/L (ref 132–146)
VIT B12 SERPL-MCNC: 1363 PG/ML (ref 211–946)
WBC OTHER # BLD: 11.3 K/UL (ref 4.5–11.5)

## 2023-09-23 PROCEDURE — 2580000003 HC RX 258: Performed by: INTERNAL MEDICINE

## 2023-09-23 PROCEDURE — 80048 BASIC METABOLIC PNL TOTAL CA: CPT

## 2023-09-23 PROCEDURE — 36415 COLL VENOUS BLD VENIPUNCTURE: CPT

## 2023-09-23 PROCEDURE — 86140 C-REACTIVE PROTEIN: CPT

## 2023-09-23 PROCEDURE — 85652 RBC SED RATE AUTOMATED: CPT

## 2023-09-23 PROCEDURE — 84207 ASSAY OF VITAMIN B-6: CPT

## 2023-09-23 PROCEDURE — 6360000002 HC RX W HCPCS

## 2023-09-23 PROCEDURE — 82746 ASSAY OF FOLIC ACID SERUM: CPT

## 2023-09-23 PROCEDURE — 6370000000 HC RX 637 (ALT 250 FOR IP)

## 2023-09-23 PROCEDURE — 82607 VITAMIN B-12: CPT

## 2023-09-23 PROCEDURE — 85025 COMPLETE CBC W/AUTO DIFF WBC: CPT

## 2023-09-23 PROCEDURE — 2580000003 HC RX 258

## 2023-09-23 PROCEDURE — 6370000000 HC RX 637 (ALT 250 FOR IP): Performed by: STUDENT IN AN ORGANIZED HEALTH CARE EDUCATION/TRAINING PROGRAM

## 2023-09-23 PROCEDURE — 2500000003 HC RX 250 WO HCPCS: Performed by: INTERNAL MEDICINE

## 2023-09-23 PROCEDURE — 2140000000 HC CCU INTERMEDIATE R&B

## 2023-09-23 PROCEDURE — 84145 PROCALCITONIN (PCT): CPT

## 2023-09-23 PROCEDURE — 87389 HIV-1 AG W/HIV-1&-2 AB AG IA: CPT

## 2023-09-23 PROCEDURE — 99233 SBSQ HOSP IP/OBS HIGH 50: CPT | Performed by: FAMILY MEDICINE

## 2023-09-23 RX ORDER — DEXTROSE AND SODIUM CHLORIDE 5; .45 G/100ML; G/100ML
INJECTION, SOLUTION INTRAVENOUS CONTINUOUS
Status: DISCONTINUED | OUTPATIENT
Start: 2023-09-23 | End: 2023-09-26

## 2023-09-23 RX ORDER — LABETALOL HYDROCHLORIDE 5 MG/ML
10 INJECTION, SOLUTION INTRAVENOUS ONCE
Status: COMPLETED | OUTPATIENT
Start: 2023-09-23 | End: 2023-09-23

## 2023-09-23 RX ORDER — LIDOCAINE HYDROCHLORIDE AND EPINEPHRINE 10; 10 MG/ML; UG/ML
20 INJECTION, SOLUTION INFILTRATION; PERINEURAL ONCE
Status: DISCONTINUED | OUTPATIENT
Start: 2023-09-23 | End: 2023-09-29 | Stop reason: HOSPADM

## 2023-09-23 RX ORDER — AMLODIPINE BESYLATE 10 MG/1
10 TABLET ORAL DAILY
Status: DISCONTINUED | OUTPATIENT
Start: 2023-09-23 | End: 2023-09-29 | Stop reason: HOSPADM

## 2023-09-23 RX ADMIN — Medication 1 MG: at 10:39

## 2023-09-23 RX ADMIN — PANTOPRAZOLE SODIUM 40 MG: 40 TABLET, DELAYED RELEASE ORAL at 10:10

## 2023-09-23 RX ADMIN — POTASSIUM CHLORIDE, DEXTROSE MONOHYDRATE AND SODIUM CHLORIDE: 150; 5; 450 INJECTION, SOLUTION INTRAVENOUS at 00:41

## 2023-09-23 RX ADMIN — HEPARIN SODIUM 5000 UNITS: 10000 INJECTION INTRAVENOUS; SUBCUTANEOUS at 18:12

## 2023-09-23 RX ADMIN — DEXTROSE AND SODIUM CHLORIDE: 5; 450 INJECTION, SOLUTION INTRAVENOUS at 17:54

## 2023-09-23 RX ADMIN — MICONAZOLE NITRATE: 20.6 POWDER TOPICAL at 10:35

## 2023-09-23 RX ADMIN — Medication: at 21:35

## 2023-09-23 RX ADMIN — HEPARIN SODIUM 5000 UNITS: 10000 INJECTION INTRAVENOUS; SUBCUTANEOUS at 10:35

## 2023-09-23 RX ADMIN — NYSTATIN 500000 UNITS: 100000 SUSPENSION ORAL at 15:37

## 2023-09-23 RX ADMIN — MICONAZOLE NITRATE: 20.6 POWDER TOPICAL at 21:36

## 2023-09-23 RX ADMIN — LORAZEPAM 1 MG: 1 TABLET ORAL at 15:36

## 2023-09-23 RX ADMIN — Medication 100 MG: at 10:11

## 2023-09-23 RX ADMIN — AMLODIPINE BESYLATE 10 MG: 10 TABLET ORAL at 10:11

## 2023-09-23 RX ADMIN — HEPARIN SODIUM 5000 UNITS: 10000 INJECTION INTRAVENOUS; SUBCUTANEOUS at 00:37

## 2023-09-23 RX ADMIN — ACETAMINOPHEN 650 MG: 325 TABLET ORAL at 15:20

## 2023-09-23 RX ADMIN — POTASSIUM CHLORIDE, DEXTROSE MONOHYDRATE AND SODIUM CHLORIDE: 150; 5; 450 INJECTION, SOLUTION INTRAVENOUS at 12:02

## 2023-09-23 RX ADMIN — Medication: at 15:22

## 2023-09-23 RX ADMIN — LABETALOL HYDROCHLORIDE 10 MG: 5 INJECTION INTRAVENOUS at 04:11

## 2023-09-23 RX ADMIN — SODIUM CHLORIDE, PRESERVATIVE FREE 10 ML: 5 INJECTION INTRAVENOUS at 21:36

## 2023-09-23 RX ADMIN — LABETALOL HYDROCHLORIDE 10 MG: 5 INJECTION INTRAVENOUS at 01:10

## 2023-09-23 ASSESSMENT — PAIN DESCRIPTION - LOCATION: LOCATION: GENERALIZED

## 2023-09-23 ASSESSMENT — PAIN - FUNCTIONAL ASSESSMENT: PAIN_FUNCTIONAL_ASSESSMENT: PREVENTS OR INTERFERES WITH MANY ACTIVE NOT PASSIVE ACTIVITIES

## 2023-09-23 ASSESSMENT — ENCOUNTER SYMPTOMS
ABDOMINAL PAIN: 0
CHEST TIGHTNESS: 0
SORE THROAT: 1
NAUSEA: 0
SHORTNESS OF BREATH: 0
TROUBLE SWALLOWING: 1
VOMITING: 0
COUGH: 0

## 2023-09-23 ASSESSMENT — PAIN DESCRIPTION - DESCRIPTORS: DESCRIPTORS: ACHING

## 2023-09-23 ASSESSMENT — PAIN DESCRIPTION - FREQUENCY: FREQUENCY: CONTINUOUS

## 2023-09-23 ASSESSMENT — PAIN SCALES - GENERAL
PAINLEVEL_OUTOF10: 0
PAINLEVEL_OUTOF10: 10
PAINLEVEL_OUTOF10: 0

## 2023-09-23 ASSESSMENT — PAIN DESCRIPTION - ONSET: ONSET: ON-GOING

## 2023-09-23 ASSESSMENT — PAIN DESCRIPTION - PAIN TYPE: TYPE: ACUTE PAIN

## 2023-09-23 NOTE — PROGRESS NOTES
General surgery consult sent to Dr. Kevin Rosario via perfect serve. Torres notified of consult per Dr. Keivn Rosario request.    1222-perfect serve sent to Dr. Pradeep Craven for ENT consult. 26- Dr. Junie Mccloud with family medicine notified of dermatology not coming to the hospital and being unable to complete consult.

## 2023-09-23 NOTE — PROGRESS NOTES
Perfect serve sent to family [facility] resident to request miconazole oint for buttocks and to check downgrade. Await response.

## 2023-09-23 NOTE — PLAN OF CARE
Problem: Discharge Planning  Goal: Discharge to home or other facility with appropriate resources  Outcome: Progressing     Problem: Safety - Adult  Goal: Free from fall injury  Outcome: Progressing  Flowsheets (Taken 9/23/2023 0035 by Isabel Wright, RN)  Free From Fall Injury: Instruct family/caregiver on patient safety     Problem: Pain  Goal: Verbalizes/displays adequate comfort level or baseline comfort level  Outcome: Progressing     Problem: Skin/Tissue Integrity  Goal: Absence of new skin breakdown  Description: 1. Monitor for areas of redness and/or skin breakdown  2. Assess vascular access sites hourly  3. Every 4-6 hours minimum:  Change oxygen saturation probe site  4. Every 4-6 hours:  If on nasal continuous positive airway pressure, respiratory therapy assess nares and determine need for appliance change or resting period.   Outcome: Progressing     Problem: Nutrition Deficit:  Goal: Optimize nutritional status  Outcome: Progressing     Problem: ABCDS Injury Assessment  Goal: Absence of physical injury  Outcome: Progressing  Flowsheets (Taken 9/23/2023 0035 by Isabel Wright, RN)  Absence of Physical Injury: Implement safety measures based on patient assessment

## 2023-09-23 NOTE — PATIENT CARE CONFERENCE
P Quality Flow/Interdisciplinary Rounds Progress Note        Quality Flow Rounds held on September 23, 2023    Disciplines Attending:  Bedside Nurse and Nursing Unit Leadership    Chrissie Toney was admitted on 9/19/2023  5:34 PM    Anticipated Discharge Date:       Disposition:    Gordon Score:  Gordon Scale Score: 13    Readmission Risk              Risk of Unplanned Readmission:  13           Discussed patient goal for the day, patient clinical progression, and barriers to discharge.   The following Goal(s) of the Day/Commitment(s) have been identified:  Labs - Report Results      Tiffany Núñez RN  September 23, 2023

## 2023-09-23 NOTE — PROGRESS NOTES
[facility] MD Shane Horn MD Armando Seaman, MD  Progress Note    9/23/2023    SUBJECTIVE:   9/22: Seen in his room comfortable on RA    9/23: Resting comfortably. Asleep. Easily awakened. No new issue or complaint. Oral intake has improved. PROBLEM LIST:    Principal Problem:    Acute kidney injury ([address])  Active Problems:    Acute renal failure ([address])  Resolved Problems:    * No resolved hospital problems. *       DIET:    ADULT ORAL NUTRITION SUPPLEMENT; Lunch, Dinner; Frozen Oral Supplement  ADULT ORAL NUTRITION SUPPLEMENT; Breakfast, Dinner; Wound Healing Oral Supplement  ADULT DIET; Clear Liquid; 3 carb choices (45 gm/meal)       Allergies : Medrol [methylprednisolone]    Past Medical History:   Diagnosis Date    Alcohol use     Essential hypertension 04/06/2020    Lumbar radiculopathy 04/06/2020       No past surgical history on file. Family History   Problem Relation Age of Onset    Cancer Mother     Asthma Mother     Hypertension Father         reports that he has never smoked. He has never used smokeless tobacco. He reports current alcohol use of about 5.0 standard drinks of alcohol per week. He reports that he does not use drugs. Review of Systems:   Constitutional: no fevers , no chills , feels ok   Eyes: no eye pain , no itching , no drainage  Ears, nose, mouth, throat, and face: no ear ,nose pain , hearing is ok ,no nasal drainage   Respiratory: no sob ,no cough ,no wheezing . Cardiovascular: no chest pain , no palpitation ,no sob . Gastrointestinal: no nausea, vomiting , constipation , no abdominal pain . Genitourinary:no urinary retention , no burning , dysuria . No polyuria   Hematologic/lymphatic: no bleeding , no cougulation issues . Musculoskeletal:no joint pain , no swelling . Neurological: no headaches ,no weakness , no numbness . Endocrine: no thirst , no weight issues .      MEDS (scheduled):    amLODIPine  10 mg Oral Daily volume. With this degree of azotemia, cannot rule out  underlying ATN. His CT scan of the abdomen showing no hydronephrosis. He has a Barrientos in a place with okay urine output. At this point, I  would continue IV fluid resuscitation and followup serial basic  metabolic panel and urine output. I will check urinalysis along with  spot urine studies to quantify presence of proteinuria. Continue to  hold his nephrotoxic medication at home including Zestril and  hydrochlorothiazide, which might have exacerbated his underlying PAULINA. 2.  Mild rhabdomyolysis. 3.  History of alcoholism. 4.  Hypocalcemia would be gentle and replacement.     Azotemia improving briskly  Hyponatremia improving  Potassium normal  Bp stable, though elevated      RECOMMENDATIONS  Change iv fluids --1/2 NS  Check BMP this evening, then change to daily barring need to make adjustments  Continue supprotive care   Follow labs, UO      Electronically signed by Mauricio Samaniego MD on 9/23/2023 at 5:29 PM

## 2023-09-23 NOTE — PROGRESS NOTES
Patient educated on importance of turning, heel protectors, and PCD's. Patient stated he understands but continues to refuse.  Will continue to attempt and continue to educate patient

## 2023-09-23 NOTE — PROGRESS NOTES
Unable to obtain formalin from pathology. Cannot perform punch biopsy at this time. Will perform 9/25 when we can have all the materials necessary.     Electronically signed by Jv Ricci DO on 9/23/2023 at 3:11 PM

## 2023-09-23 NOTE — PROGRESS NOTES
[facility] Inpatient   Resident Progress Note    S:  Hospital day: 4   Brief Synopsis: Michelle Bird is a 46 y.o. male with a PMH of hypertension, GERD, depression, alcohol use disorder and chronic low back pain who presents emergency department for altered mental status after being discovered on the floor at home. Patient states that he had been laying there for 2 days. Patient denied faling, syncope or near syncope. Work up revealed significant PAULINA, Rhabdomyolysis with CK >76,645 ,  metabolic acidosis, hyperglycemia, elevated lipase, cortisol and WBC. CT head, CT Cervical, thoracic and lumbar spine were WNL, CT chest revealed findings concerning for emphysematous COPD, CT Abdomen revealed mild sigmoid diverticulosis. Patient was also hypokalemic, hypocalcemic, hypophosphatemic. Blood and urine cultures, UA and urine studies were sent. He was started on IV fluids, bicarbonate drip and KCL drip. He also received a replenishment with calcium gluconate. Patient's home medications including amitriptyline, sertraline, gabapentin, HCTZ and lisinopril were held. Patient's renal function and CK level  has been steadily improving. Overnight/interim:   Patient was seen and examined at bedside this morning  Patient complains of pain over his entire body.   States his skin is sensitive  Denies chest pain, n/v, abdominal pain, fever or chills          Cont meds:    dextrose 5% and 0.45% NaCl with KCl 20 mEq 85 mL/hr at 09/23/23 1202    sodium chloride      dextrose       Scheduled meds:    amLODIPine  10 mg Oral Daily    miconazole nitrate   Topical BID    lidocaine-EPINEPHrine  20 mL IntraDERmal Once    miconazole   Topical BID    [Held by provider] amitriptyline  25 mg Oral Nightly    [Held by provider] gabapentin  300 mg Oral BID    pantoprazole  40 mg Oral Daily with breakfast    [Held by provider] sertraline  100 mg Oral Daily    sodium chloride flush  5-40 mL IntraVENous 2 times per day    heparin abdomen and pelvis. Resident Assessment and Plan       1. PAULINA  - BUN 75 Cr 2.1, GFR 5 > 37  - Nephrology following  - Continue IV fluids  - Monitor BMP q6hrs  - Holding all nephrotoxic medications  - UA pending  - UDS pending  - Urine cultures pending     2. Rhabdomyolysis  - CK 1616  - Patient reporting improvements in myalgia  - Monitor CK  - Monitor I/O  - Serial neuro checks  - Blood cultures pending       3. Electrolyte imbalances  - Hypokalemia resolved  - Hypocalcemia resolved  - Hypernatremia, improving  - IV  infusion changed to D5 1/2 NS K20 by nephro  - Continue KCL drip  - Calcium gluconate given 09/20  - Monitor BMP  - Gentle repletion per nephro     4. HTN  - HCTZ and Lisinopril held  - Continue Amlodipine  - Monitor vitals     5. Alcohol use disorder  - Monitor for alcohol withdrawal  - Ativan PRN ordered    Skin/Mouth lesions  -ENT consulted reportedly ulcers/blisters on surface of patient's tongue  -General surgery consulted for biopsy, likely to happen on Monday  -Dermatology consulted, unlikely to come to hospital    DVT/GI ppx: PCD and heparin / Protonix        Electronically signed by Yane Esparza MD on 9/23/2023 at 4:52 PM  Attending physician: Dr. Nelli Diallo

## 2023-09-23 NOTE — CONSULTS
OTOLARYNGOLOGY  CONSULT NOTE  9/23/2023    Physician Consulted: Dr. Lei Zelaya  Reason for Consult: tongue lesions  Referring Physician: Dr. Rboson MONROE  Hiren Kinney is a 46 y.o. male with a h/o HTN, EtOH abuse and depression who ENT was consulted for evaluation of tongue lesions. He was admitted 4 days ago with AMS, PAULINA and rhabdomyolysis. Patient reports that at the same time he developed painful blisters to his tongue, legs and anus. Denies any tobacco use. Denies any similar symptoms in the past. The tongue lesion is painful and makes it hard to eat. Review of Systems   HENT:  Positive for sore throat and trouble swallowing. All other systems reviewed and are negative. Past Medical History:   Diagnosis Date    Alcohol use     Essential hypertension 04/06/2020    Lumbar radiculopathy 04/06/2020       No past surgical history on file. Medications Prior to Admission:    Prior to Admission medications    Medication Sig Start Date End Date Taking? Authorizing Provider   naproxen (NAPROSYN) 500 MG tablet Take 1 tablet by mouth 2 times daily (with meals)   Yes Historical Provider, MD   lidocaine (LIDODERM) 5 % Place 1 patch onto the skin daily 12 hours on, 12 hours off. 9/1/23 10/1/23  Denny Valera MD   amitriptyline (ELAVIL) 25 MG tablet take 1 tablet by mouth nightly 8/18/23 11/16/23  Denny Valera MD   sertraline (ZOLOFT) 100 MG tablet Take 1 tablet by mouth daily 8/11/23   Denny Valera MD   amLODIPine [facility]) 5 MG tablet Take 1 tablet by mouth daily 8/11/23   Denny Valera MD   gabapentin (NEURONTIN) 300 MG capsule Take 1 capsule by mouth 2 times daily for 90 days.  Intended supply: 90 days 6/27/23 9/25/23  Denny Valera MD   lisinopril-hydroCHLOROthiazide [facility]) 20-25 MG per tablet Take 1 tablet by mouth daily 6/27/23   Denny Valera MD   pantoprazole (PROTONIX) 40 MG tablet Take 1 tablet by mouth daily (with breakfast) provided for review. Adjustment of mA and/or kV according to patient size was utilized. Automated exposure control, iterative reconstruction, and/or weight based adjustment of the mA/kV was utilized to reduce the radiation dose to as low as reasonably achievable. COMPARISON: None HISTORY: ORDERING SYSTEM PROVIDED HISTORY: pain after fall, difficulty flexing hip TECHNOLOGIST PROVIDED HISTORY: Reason for exam:->pain after fall, difficulty flexing hip Decision Support Exception - unselect if not a suspected or confirmed emergency medical condition->Emergency Medical Condition (MA) What reading provider will be dictating this exam?->CRC FINDINGS: BONES/ALIGNMENT: There is normal alignment of the spine. The vertebral body heights are maintained. No osseous destructive lesion is seen. DEGENERATIVE CHANGES: There are mild discogenic changes at the L5-S1 level without evidence of acute fracture or subluxation. SOFT TISSUES/RETROPERITONEUM: No paraspinal mass is seen. Unremarkable non-contrast CT of the lumbar spine. CT THORACIC SPINE WO CONTRAST    Result Date: 9/19/2023  EXAMINATION: CT OF THE THORACIC SPINE WITHOUT CONTRAST  9/19/2023 9:24 pm: TECHNIQUE: CT of the thoracic spine was performed without the administration of intravenous contrast. Multiplanar reformatted images are provided for review. Automated exposure control, iterative reconstruction, and/or weight based adjustment of the mA/kV was utilized to reduce the radiation dose to as low as reasonably achievable. COMPARISON: None. HISTORY: ORDERING SYSTEM PROVIDED HISTORY: fall pain TECHNOLOGIST PROVIDED HISTORY: Reason for exam:->fall pain What reading provider will be dictating this exam?->CRC FINDINGS: BONES/ALIGNMENT: There is normal alignment of the spine. The vertebral body heights are maintained. No osseous destructive lesion is seen. DEGENERATIVE CHANGES: No gross spinal canal stenosis or bony neural foraminal narrowing of the thoracic spine.  SOFT

## 2023-09-23 NOTE — CONSULTS
GENERAL SURGERY  CONSULT NOTE  9/23/2023    Physician Consulted: Dr. Zara Hayden  Reason for Consult: blisters on ankle  Referring Physician: Dr. Akosua MONROE  Aguila Ramirez is a 46 y.o. male who presents for evaluation of blisters on ankle. Pt presented to the hospital with AMS after being alone for 2 day period. Per the chart, the pt has issues with alcohol and substance abuse. Per the patient, he has had blisters on his lower extremities present prior to admission but they have gotten bigger since being in the hospital. He denies the lesions which are present on his mouth and buttock to be present prior to admission. He has been having issues swallowing due to the blisters present in his mouth. He also has pain to light palpation where the blisters are present. Per discussion with other physicians seeing the patient, he also has lesions present on his buttock and around his anus. However, pt states he has been having regular bowel movements. Denies any medical history other than HTN and chronic back pain. Denies known history HIV or other immunosuppressive diseases. General surgery was consulted to perform punch biopsy of lesion to rule out SJS vs pemphigus vulgaris. On exam, blisters are present on bilateral lower extremities. Media added in chart. Sores noted in mouth and on tongue. Past Medical History:   Diagnosis Date    Alcohol use     Essential hypertension 04/06/2020    Lumbar radiculopathy 04/06/2020       No past surgical history on file. Medications Prior to Admission:    Prior to Admission medications    Medication Sig Start Date End Date Taking?  Authorizing Provider   naproxen (NAPROSYN) 500 MG tablet Take 1 tablet by mouth 2 times daily (with meals)   Yes Historical Provider, MD   lidocaine (LIDODERM) 5 % Place 1 patch onto the skin daily 12 hours on, 12 hours off. 9/1/23 10/1/23  Joey Zuniga MD   amitriptyline (ELAVIL) 25 MG tablet take 1 tablet by mouth nightly 8/18/23 will be dictating this exam?->CRC FINDINGS: BONES/ALIGNMENT: There is normal alignment of the spine. The vertebral body heights are maintained. No osseous destructive lesion is seen. DEGENERATIVE CHANGES: There are mild discogenic changes at the L5-S1 level without evidence of acute fracture or subluxation. SOFT TISSUES/RETROPERITONEUM: No paraspinal mass is seen. Unremarkable non-contrast CT of the lumbar spine. CT THORACIC SPINE WO CONTRAST    Result Date: 9/19/2023  EXAMINATION: CT OF THE THORACIC SPINE WITHOUT CONTRAST  9/19/2023 9:24 pm: TECHNIQUE: CT of the thoracic spine was performed without the administration of intravenous contrast. Multiplanar reformatted images are provided for review. Automated exposure control, iterative reconstruction, and/or weight based adjustment of the mA/kV was utilized to reduce the radiation dose to as low as reasonably achievable. COMPARISON: None. HISTORY: ORDERING SYSTEM PROVIDED HISTORY: fall pain TECHNOLOGIST PROVIDED HISTORY: Reason for exam:->fall pain What reading provider will be dictating this exam?->CRC FINDINGS: BONES/ALIGNMENT: There is normal alignment of the spine. The vertebral body heights are maintained. No osseous destructive lesion is seen. DEGENERATIVE CHANGES: No gross spinal canal stenosis or bony neural foraminal narrowing of the thoracic spine. SOFT TISSUES: No paraspinal mass is seen. Unremarkable CT of the thoracic spine. CT Head W/O Contrast    Result Date: 9/19/2023  EXAMINATION: CT OF THE HEAD WITHOUT CONTRAST  9/19/2023 9:24 pm TECHNIQUE: CT of the head was performed without the administration of intravenous contrast. Automated exposure control, iterative reconstruction, and/or weight based adjustment of the mA/kV was utilized to reduce the radiation dose to as low as reasonably achievable. COMPARISON: None.  HISTORY: ORDERING SYSTEM PROVIDED HISTORY: fall weakness TECHNOLOGIST PROVIDED HISTORY: Reason for exam:->fall weakness Has a

## 2023-09-24 ENCOUNTER — APPOINTMENT (OUTPATIENT)
Dept: GENERAL RADIOLOGY | Age: 53
DRG: 469 | End: 2023-09-24
Payer: COMMERCIAL

## 2023-09-24 LAB
ANION GAP SERPL CALCULATED.3IONS-SCNC: 8 MMOL/L (ref 7–16)
BASOPHILS # BLD: 0.05 K/UL (ref 0–0.2)
BASOPHILS NFR BLD: 1 % (ref 0–2)
BUN SERPL-MCNC: 17 MG/DL (ref 6–20)
CALCIUM SERPL-MCNC: 8.1 MG/DL (ref 8.6–10.2)
CHLORIDE SERPL-SCNC: 112 MMOL/L (ref 98–107)
CO2 SERPL-SCNC: 24 MMOL/L (ref 22–29)
CREAT SERPL-MCNC: 1.1 MG/DL (ref 0.7–1.2)
DATE, STOOL #1: ABNORMAL
EOSINOPHIL # BLD: 0.07 K/UL (ref 0.05–0.5)
EOSINOPHILS RELATIVE PERCENT: 1 % (ref 0–6)
ERYTHROCYTE [DISTWIDTH] IN BLOOD BY AUTOMATED COUNT: 15.9 % (ref 11.5–15)
GABAPENTIN SERPLBLD-MCNC: <0.5 UG/ML (ref 2–20)
GFR SERPL CREATININE-BSD FRML MDRD: >60 ML/MIN/1.73M2
GLUCOSE SERPL-MCNC: 121 MG/DL (ref 74–99)
HCT VFR BLD AUTO: 32.2 % (ref 37–54)
HCT VFR BLD AUTO: 34.6 % (ref 37–54)
HEMOCCULT SP1 STL QL: POSITIVE
HGB BLD-MCNC: 10.3 G/DL (ref 12.5–16.5)
HGB BLD-MCNC: 11.1 G/DL (ref 12.5–16.5)
IMM GRANULOCYTES # BLD AUTO: 0.45 K/UL (ref 0–0.58)
IMM GRANULOCYTES NFR BLD: 5 % (ref 0–5)
LYMPHOCYTES NFR BLD: 1.3 K/UL (ref 1.5–4)
LYMPHOCYTES RELATIVE PERCENT: 13 % (ref 20–42)
MAGNESIUM SERPL-MCNC: 1.2 MG/DL (ref 1.6–2.6)
MCH RBC QN AUTO: 29.6 PG (ref 26–35)
MCHC RBC AUTO-ENTMCNC: 32.1 G/DL (ref 32–34.5)
MCV RBC AUTO: 92.3 FL (ref 80–99.9)
MICROORGANISM SPEC CULT: NORMAL
MICROORGANISM SPEC CULT: NORMAL
MONOCYTES NFR BLD: 0.86 K/UL (ref 0.1–0.95)
MONOCYTES NFR BLD: 9 % (ref 2–12)
NEUTROPHILS NFR BLD: 72 % (ref 43–80)
NEUTS SEG NFR BLD: 7.04 K/UL (ref 1.8–7.3)
PHOSPHATE SERPL-MCNC: 2.2 MG/DL (ref 2.5–4.5)
PLATELET # BLD AUTO: 126 K/UL (ref 130–450)
PLATELET CONFIRMATION: NORMAL
PMV BLD AUTO: 11.6 FL (ref 7–12)
POTASSIUM SERPL-SCNC: 3.7 MMOL/L (ref 3.5–5)
RBC # BLD AUTO: 3.75 M/UL (ref 3.8–5.8)
SERVICE CMNT-IMP: NORMAL
SERVICE CMNT-IMP: NORMAL
SODIUM SERPL-SCNC: 144 MMOL/L (ref 132–146)
SPECIMEN DESCRIPTION: NORMAL
SPECIMEN DESCRIPTION: NORMAL
TIME, STOOL #1: 945
WBC OTHER # BLD: 9.8 K/UL (ref 4.5–11.5)

## 2023-09-24 PROCEDURE — 87427 SHIGA-LIKE TOXIN AG IA: CPT

## 2023-09-24 PROCEDURE — 83735 ASSAY OF MAGNESIUM: CPT

## 2023-09-24 PROCEDURE — 71046 X-RAY EXAM CHEST 2 VIEWS: CPT

## 2023-09-24 PROCEDURE — 36415 COLL VENOUS BLD VENIPUNCTURE: CPT

## 2023-09-24 PROCEDURE — 84100 ASSAY OF PHOSPHORUS: CPT

## 2023-09-24 PROCEDURE — 6360000002 HC RX W HCPCS: Performed by: INTERNAL MEDICINE

## 2023-09-24 PROCEDURE — 6370000000 HC RX 637 (ALT 250 FOR IP)

## 2023-09-24 PROCEDURE — 85018 HEMOGLOBIN: CPT

## 2023-09-24 PROCEDURE — 6360000002 HC RX W HCPCS

## 2023-09-24 PROCEDURE — 6370000000 HC RX 637 (ALT 250 FOR IP): Performed by: STUDENT IN AN ORGANIZED HEALTH CARE EDUCATION/TRAINING PROGRAM

## 2023-09-24 PROCEDURE — 2140000000 HC CCU INTERMEDIATE R&B

## 2023-09-24 PROCEDURE — 82272 OCCULT BLD FECES 1-3 TESTS: CPT

## 2023-09-24 PROCEDURE — 80048 BASIC METABOLIC PNL TOTAL CA: CPT

## 2023-09-24 PROCEDURE — 85025 COMPLETE CBC W/AUTO DIFF WBC: CPT

## 2023-09-24 PROCEDURE — 2580000003 HC RX 258

## 2023-09-24 PROCEDURE — 87046 STOOL CULTR AEROBIC BACT EA: CPT

## 2023-09-24 PROCEDURE — 6370000000 HC RX 637 (ALT 250 FOR IP): Performed by: INTERNAL MEDICINE

## 2023-09-24 PROCEDURE — 85014 HEMATOCRIT: CPT

## 2023-09-24 PROCEDURE — 83993 ASSAY FOR CALPROTECTIN FECAL: CPT

## 2023-09-24 PROCEDURE — 99232 SBSQ HOSP IP/OBS MODERATE 35: CPT | Performed by: FAMILY MEDICINE

## 2023-09-24 PROCEDURE — 87529 HSV DNA AMP PROBE: CPT

## 2023-09-24 PROCEDURE — 87205 SMEAR GRAM STAIN: CPT

## 2023-09-24 PROCEDURE — 87045 FECES CULTURE AEROBIC BACT: CPT

## 2023-09-24 PROCEDURE — 82705 FATS/LIPIDS FECES QUAL: CPT

## 2023-09-24 PROCEDURE — 99253 IP/OBS CNSLTJ NEW/EST LOW 45: CPT | Performed by: OTOLARYNGOLOGY

## 2023-09-24 RX ORDER — MAGNESIUM SULFATE IN WATER 40 MG/ML
4000 INJECTION, SOLUTION INTRAVENOUS ONCE
Status: COMPLETED | OUTPATIENT
Start: 2023-09-24 | End: 2023-09-24

## 2023-09-24 RX ORDER — POTASSIUM CHLORIDE 20 MEQ/1
20 TABLET, EXTENDED RELEASE ORAL 2 TIMES DAILY WITH MEALS
Status: COMPLETED | OUTPATIENT
Start: 2023-09-24 | End: 2023-09-24

## 2023-09-24 RX ADMIN — ACETAMINOPHEN 650 MG: 325 TABLET ORAL at 01:23

## 2023-09-24 RX ADMIN — MICONAZOLE NITRATE: 20.6 POWDER TOPICAL at 20:12

## 2023-09-24 RX ADMIN — Medication 250 MG: at 12:14

## 2023-09-24 RX ADMIN — SODIUM CHLORIDE, PRESERVATIVE FREE 10 ML: 5 INJECTION INTRAVENOUS at 20:12

## 2023-09-24 RX ADMIN — AMLODIPINE BESYLATE 10 MG: 10 TABLET ORAL at 08:56

## 2023-09-24 RX ADMIN — Medication: at 20:13

## 2023-09-24 RX ADMIN — HEPARIN SODIUM 5000 UNITS: 10000 INJECTION INTRAVENOUS; SUBCUTANEOUS at 18:16

## 2023-09-24 RX ADMIN — Medication 5 ML: at 12:18

## 2023-09-24 RX ADMIN — PANTOPRAZOLE SODIUM 40 MG: 40 TABLET, DELAYED RELEASE ORAL at 08:56

## 2023-09-24 RX ADMIN — POTASSIUM CHLORIDE 20 MEQ: 1500 TABLET, EXTENDED RELEASE ORAL at 08:56

## 2023-09-24 RX ADMIN — LORAZEPAM 1 MG: 1 TABLET ORAL at 12:25

## 2023-09-24 RX ADMIN — Medication 1 MG: at 08:56

## 2023-09-24 RX ADMIN — Medication 5 ML: at 18:13

## 2023-09-24 RX ADMIN — Medication: at 09:01

## 2023-09-24 RX ADMIN — HEPARIN SODIUM 5000 UNITS: 10000 INJECTION INTRAVENOUS; SUBCUTANEOUS at 09:16

## 2023-09-24 RX ADMIN — Medication 100 MG: at 08:55

## 2023-09-24 RX ADMIN — MICONAZOLE NITRATE: 20.6 POWDER TOPICAL at 09:00

## 2023-09-24 RX ADMIN — POTASSIUM CHLORIDE 20 MEQ: 1500 TABLET, EXTENDED RELEASE ORAL at 18:12

## 2023-09-24 RX ADMIN — HEPARIN SODIUM 5000 UNITS: 10000 INJECTION INTRAVENOUS; SUBCUTANEOUS at 00:47

## 2023-09-24 RX ADMIN — LORAZEPAM 1 MG: 1 TABLET ORAL at 05:19

## 2023-09-24 RX ADMIN — MAGNESIUM SULFATE HEPTAHYDRATE 4000 MG: 40 INJECTION, SOLUTION INTRAVENOUS at 12:09

## 2023-09-24 ASSESSMENT — PAIN DESCRIPTION - DESCRIPTORS: DESCRIPTORS: ACHING;SORE

## 2023-09-24 ASSESSMENT — PAIN DESCRIPTION - LOCATION: LOCATION: GENERALIZED

## 2023-09-24 ASSESSMENT — PAIN - FUNCTIONAL ASSESSMENT: PAIN_FUNCTIONAL_ASSESSMENT: PREVENTS OR INTERFERES SOME ACTIVE ACTIVITIES AND ADLS

## 2023-09-24 ASSESSMENT — PAIN SCALES - GENERAL: PAINLEVEL_OUTOF10: 10

## 2023-09-24 NOTE — PROGRESS NOTES
General surgery was asked to see patient again for supposedly melanotic stool this morning. Patient denies any hematemesis, hematosis, hematochezia or melena. He denies ever having any of the symptoms. He denies ever having scopes. He denies any being on any blood thinners. Per nurse, stool was rather dark brown not melanotic. On physical exam, his abdominal exam was benign. On rectal exam, there is no hemorrhoids noted, no masses. His FOBT was positive. His vitals are stable. Globin is 10.3 which dropped less than units since admission. Patient is hemodynamically stable. Hemoglobin level stable. He has been on fluids and receiving antibiotics. Drop in hemoglobin most likely hemodilutional.  No plans for inpatient scopes, might benefit from outpatient scopes.      Electronically signed by Yvette Montes DO on 9/24/2023 at 4:08 PM

## 2023-09-24 NOTE — PROGRESS NOTES
Patient continues to refuse turns, heel protectors, and PCD's. Educated patient on importance of turning and shifting weight at least every 2 hours to prevent skin breakdown. Educated on off leading heels and use of PCD's to prevent DVT. Patient continues to refuse.

## 2023-09-24 NOTE — PROGRESS NOTES
Patient reports to family medicine team he is experiencing numbness and tingling in his fingers, first time patient has expressed this symptom.

## 2023-09-24 NOTE — PROGRESS NOTES
[facility] Inpatient   Resident Progress Note    S:  Hospital day: 5   Brief Synopsis: Diamond Figueroa is a 46 y.o. male with a PMH of hypertension, GERD, depression, alcohol use disorder and chronic low back pain who presents emergency department for altered mental status after being discovered on the floor at home. Patient states that he had been laying there for 2 days. Patient denied faling, syncope or near syncope. Work up revealed significant PAULINA, Rhabdomyolysis with CK >53,661 ,  metabolic acidosis, hyperglycemia, elevated lipase, cortisol and WBC. CT head, CT Cervical, thoracic and lumbar spine were WNL, CT chest revealed findings concerning for emphysematous COPD, CT Abdomen revealed mild sigmoid diverticulosis. Patient was also hypokalemic, hypocalcemic, hypophosphatemic. Blood and urine cultures, UA and urine studies were sent. He was started on IV fluids, bicarbonate drip and KCL drip. He also received a replenishment with calcium gluconate. Patient's home medications including amitriptyline, sertraline, gabapentin, HCTZ and lisinopril were held. Patient's renal function and CK level  has been steadily improving.      Overnight/interim:   Patient was seen and examined at bedside this morning  Patient still complains of pain over his entire body, but is less sensitive compared to yesterday  Patient complains of having frequent diarrhea  He has significant mouth sore that keeps him from eating food  Denies chest pain, n/v, abdominal pain, fever or chills  New onset cough and sore throat          Cont meds:    dextrose 5 % and 0.45 % NaCl 75 mL/hr at 09/24/23 0716    sodium chloride      dextrose       Scheduled meds:    potassium chloride  20 mEq Oral BID WC    potassium & sodium phosphates  1 packet Oral 4x Daily    amLODIPine  10 mg Oral Daily    miconazole nitrate   Topical BID    lidocaine-EPINEPHrine  20 mL IntraDERmal Once    miconazole   Topical BID    [Held by provider] amitriptyline Conjunctiva/sclera: Conjunctivae normal.      Pupils: Pupils are equal, round, and reactive to light. Cardiovascular:      Rate and Rhythm: Normal rate and regular rhythm. Pulses: Normal pulses. Heart sounds: Normal heart sounds. No murmur heard. No gallop. Pulmonary:      Effort: Pulmonary effort is normal.      Breath sounds: No rhonchi. Abdominal:      General: Abdomen is flat. There is no distension. Palpations: Abdomen is soft. There is no mass. Tenderness: There is no abdominal tenderness. There is no guarding. Musculoskeletal:         General: Tenderness (Tender and sensitive to touch in his legs, arms) present. No swelling. Normal range of motion. Cervical back: Normal range of motion. No tenderness. Right lower leg: Edema (2+ at ankle level) present. Left lower leg: Edema (2+ at ankle level) present. Lymphadenopathy:      Cervical: No cervical adenopathy. Skin:     General: Skin is warm and dry. Findings: Lesion (Ulcers on tocks and perianal area) present. Neurological:      General: No focal deficit present. Mental Status: He is alert and oriented to person, place, and time. Psychiatric:         Mood and Affect: Mood normal.         Behavior: Behavior normal.           Labs:  Na/K/Cl/CO2:  144/3.7/112/24 (09/24 0415)  BUN/Cr/glu/ALT/AST/amyl/lip:  17/1.1/--/--/--/--/-- (09/24 0415)  WBC/Hgb/Hct/Plts:  --/10.3/32.2/-- (09/24 1447)  estimated creatinine clearance is 76 mL/min (based on SCr of 1.1 mg/dL). Other pertinent labs as noted below    Radiology:  XR CHEST (2 VW)   Final Result   No acute process. CT Head W/O Contrast   Final Result   No acute intracranial abnormality. CT CSpine W/O Contrast   Final Result   No acute abnormality of the cervical spine. CT THORACIC SPINE WO CONTRAST   Final Result   Unremarkable CT of the thoracic spine.          CT LUMBAR SPINE WO CONTRAST   Final Result   Unremarkable non-contrast

## 2023-09-24 NOTE — PLAN OF CARE
Problem: Discharge Planning  Goal: Discharge to home or other facility with appropriate resources  9/24/2023 1312 by Brielle Mendez RN  Outcome: Progressing  9/24/2023 0030 by Ashlyn Nieves RN  Outcome: Progressing     Problem: Safety - Adult  Goal: Free from fall injury  9/24/2023 1312 by Brielle Mendez RN  Outcome: Progressing  9/24/2023 0030 by Ashlyn Nieves RN  Outcome: Progressing     Problem: Pain  Goal: Verbalizes/displays adequate comfort level or baseline comfort level  9/24/2023 1312 by Brielle Mendez RN  Outcome: Progressing  9/24/2023 0030 by Ashlyn Nieves RN  Outcome: Progressing     Problem: Skin/Tissue Integrity  Goal: Absence of new skin breakdown  Description: 1. Monitor for areas of redness and/or skin breakdown  2. Assess vascular access sites hourly  3. Every 4-6 hours minimum:  Change oxygen saturation probe site  4. Every 4-6 hours:  If on nasal continuous positive airway pressure, respiratory therapy assess nares and determine need for appliance change or resting period.   9/24/2023 1312 by Brielle Mendez RN  Outcome: Progressing  9/24/2023 0030 by Ashlyn Nieves RN  Outcome: Progressing     Problem: Nutrition Deficit:  Goal: Optimize nutritional status  9/24/2023 1312 by Brielle Mendez RN  Outcome: Progressing  9/24/2023 0030 by Ashlyn Nieves RN  Outcome: Progressing     Problem: ABCDS Injury Assessment  Goal: Absence of physical injury  Outcome: Progressing     Problem: ABCDS Injury Assessment  Goal: Absence of physical injury  Outcome: Progressing

## 2023-09-24 NOTE — PLAN OF CARE
Problem: Discharge Planning  Goal: Discharge to home or other facility with appropriate resources  9/24/2023 0030 by Caryn Mills RN  Outcome: Progressing     Problem: Safety - Adult  Goal: Free from fall injury  9/24/2023 0030 by Caryn Mills RN  Outcome: Progressing     Problem: Pain  Goal: Verbalizes/displays adequate comfort level or baseline comfort level  9/24/2023 0030 by Caryn Mills RN  Outcome: Progressing     Problem: Skin/Tissue Integrity  Goal: Absence of new skin breakdown  Description: 1. Monitor for areas of redness and/or skin breakdown  2. Assess vascular access sites hourly  3. Every 4-6 hours minimum:  Change oxygen saturation probe site  4. Every 4-6 hours:  If on nasal continuous positive airway pressure, respiratory therapy assess nares and determine need for appliance change or resting period.   9/24/2023 0030 by Caryn Mills RN  Outcome: Progressing     Problem: Nutrition Deficit:  Goal: Optimize nutritional status  9/24/2023 0030 by Caryn Mills RN  Outcome: Progressing

## 2023-09-24 NOTE — PROGRESS NOTES
Dr. Hemant Alcazar with general surgery notified that family med would like patient seen for:Patient with dark loose stools and downtrending hemoglobin - positive fecal occult.  Hx of alcohol abuse

## 2023-09-24 NOTE — PATIENT CARE CONFERENCE
P Quality Flow/Interdisciplinary Rounds Progress Note        Quality Flow Rounds held on September 24, 2023    Disciplines Attending:  Bedside Nurse and Nursing Unit Leadership    Ramez Estrada was admitted on 9/19/2023  5:34 PM    Anticipated Discharge Date:       Disposition:    Gordon Score:  Gordon Scale Score: 13    Readmission Risk              Risk of Unplanned Readmission:  14           Discussed patient goal for the day, patient clinical progression, and barriers to discharge.   The following Goal(s) of the Day/Commitment(s) have been identified:  Labs - Report Results      Lorie Aranda RN  September 24, 2023

## 2023-09-25 LAB
ALBUMIN SERPL-MCNC: 2.4 G/DL (ref 3.5–5.2)
ALP SERPL-CCNC: 57 U/L (ref 40–129)
ALT SERPL-CCNC: 21 U/L (ref 0–40)
ANION GAP SERPL CALCULATED.3IONS-SCNC: 10 MMOL/L (ref 7–16)
AST SERPL-CCNC: 18 U/L (ref 0–39)
BASOPHILS # BLD: 0.04 K/UL (ref 0–0.2)
BASOPHILS NFR BLD: 0 % (ref 0–2)
BILIRUB SERPL-MCNC: 0.3 MG/DL (ref 0–1.2)
BUN SERPL-MCNC: 13 MG/DL (ref 6–20)
CALCIUM SERPL-MCNC: 7.8 MG/DL (ref 8.6–10.2)
CHLORIDE SERPL-SCNC: 105 MMOL/L (ref 98–107)
CHOLEST SERPL-MCNC: 99 MG/DL
CO2 SERPL-SCNC: 23 MMOL/L (ref 22–29)
CREAT SERPL-MCNC: 1 MG/DL (ref 0.7–1.2)
EOSINOPHIL # BLD: 0.08 K/UL (ref 0.05–0.5)
EOSINOPHILS RELATIVE PERCENT: 1 % (ref 0–6)
ERYTHROCYTE [DISTWIDTH] IN BLOOD BY AUTOMATED COUNT: 15.2 % (ref 11.5–15)
GFR SERPL CREATININE-BSD FRML MDRD: >60 ML/MIN/1.73M2
GLUCOSE SERPL-MCNC: 105 MG/DL (ref 74–99)
HCT VFR BLD AUTO: 29.4 % (ref 37–54)
HCT VFR BLD AUTO: 31.3 % (ref 37–54)
HDLC SERPL-MCNC: 29 MG/DL
HGB BLD-MCNC: 10.4 G/DL (ref 12.5–16.5)
HGB BLD-MCNC: 9.6 G/DL (ref 12.5–16.5)
HIV 1+2 AB+HIV1 P24 AG SERPL QL IA: NONREACTIVE
HSV1 DNA SPEC QL NAA+PROBE: NOT DETECTED
HSV2 DNA SPEC QL NAA+PROBE: NOT DETECTED
IMM GRANULOCYTES # BLD AUTO: 0.43 K/UL (ref 0–0.58)
IMM GRANULOCYTES NFR BLD: 4 % (ref 0–5)
LDLC SERPL CALC-MCNC: 34 MG/DL
LYMPHOCYTES NFR BLD: 1.54 K/UL (ref 1.5–4)
LYMPHOCYTES RELATIVE PERCENT: 15 % (ref 20–42)
MAGNESIUM SERPL-MCNC: 1.5 MG/DL (ref 1.6–2.6)
MCH RBC QN AUTO: 29.4 PG (ref 26–35)
MCHC RBC AUTO-ENTMCNC: 32.7 G/DL (ref 32–34.5)
MCV RBC AUTO: 90.2 FL (ref 80–99.9)
MICROORGANISM/AGENT SPEC: NORMAL
MONOCYTES NFR BLD: 0.87 K/UL (ref 0.1–0.95)
MONOCYTES NFR BLD: 9 % (ref 2–12)
NEUTROPHILS NFR BLD: 70 % (ref 43–80)
NEUTS SEG NFR BLD: 7.01 K/UL (ref 1.8–7.3)
PHOSPHATE SERPL-MCNC: 2.6 MG/DL (ref 2.5–4.5)
PLATELET # BLD AUTO: 111 K/UL (ref 130–450)
PMV BLD AUTO: 11.1 FL (ref 7–12)
POTASSIUM SERPL-SCNC: 3.8 MMOL/L (ref 3.5–5)
PREALB SERPL-MCNC: 15 MG/DL (ref 20–40)
PROT SERPL-MCNC: 5.6 G/DL (ref 6.4–8.3)
RBC # BLD AUTO: 3.26 M/UL (ref 3.8–5.8)
SODIUM SERPL-SCNC: 138 MMOL/L (ref 132–146)
SPECIMEN DESCRIPTION: NORMAL
SPECIMEN DESCRIPTION: NORMAL
TRIGL SERPL-MCNC: 179 MG/DL
VLDLC SERPL CALC-MCNC: 36 MG/DL
WBC OTHER # BLD: 10 K/UL (ref 4.5–11.5)

## 2023-09-25 PROCEDURE — 80053 COMPREHEN METABOLIC PANEL: CPT

## 2023-09-25 PROCEDURE — 97530 THERAPEUTIC ACTIVITIES: CPT

## 2023-09-25 PROCEDURE — 88305 TISSUE EXAM BY PATHOLOGIST: CPT

## 2023-09-25 PROCEDURE — 6360000002 HC RX W HCPCS

## 2023-09-25 PROCEDURE — 86592 SYPHILIS TEST NON-TREP QUAL: CPT

## 2023-09-25 PROCEDURE — 36415 COLL VENOUS BLD VENIPUNCTURE: CPT

## 2023-09-25 PROCEDURE — 2580000003 HC RX 258: Performed by: INTERNAL MEDICINE

## 2023-09-25 PROCEDURE — 2580000003 HC RX 258

## 2023-09-25 PROCEDURE — 97165 OT EVAL LOW COMPLEX 30 MIN: CPT

## 2023-09-25 PROCEDURE — 6370000000 HC RX 637 (ALT 250 FOR IP)

## 2023-09-25 PROCEDURE — 80061 LIPID PANEL: CPT

## 2023-09-25 PROCEDURE — 99232 SBSQ HOSP IP/OBS MODERATE 35: CPT | Performed by: FAMILY MEDICINE

## 2023-09-25 PROCEDURE — 82710 FATS/LIPIDS FECES QUANT: CPT

## 2023-09-25 PROCEDURE — 97535 SELF CARE MNGMENT TRAINING: CPT

## 2023-09-25 PROCEDURE — 85018 HEMOGLOBIN: CPT

## 2023-09-25 PROCEDURE — 83735 ASSAY OF MAGNESIUM: CPT

## 2023-09-25 PROCEDURE — 2140000000 HC CCU INTERMEDIATE R&B

## 2023-09-25 PROCEDURE — 85014 HEMATOCRIT: CPT

## 2023-09-25 PROCEDURE — 84100 ASSAY OF PHOSPHORUS: CPT

## 2023-09-25 PROCEDURE — 85025 COMPLETE CBC W/AUTO DIFF WBC: CPT

## 2023-09-25 PROCEDURE — 84134 ASSAY OF PREALBUMIN: CPT

## 2023-09-25 PROCEDURE — 84425 ASSAY OF VITAMIN B-1: CPT

## 2023-09-25 PROCEDURE — 97161 PT EVAL LOW COMPLEX 20 MIN: CPT

## 2023-09-25 RX ORDER — CALCIUM CARBONATE 500 MG/1
500 TABLET, CHEWABLE ORAL 3 TIMES DAILY PRN
Status: DISCONTINUED | OUTPATIENT
Start: 2023-09-25 | End: 2023-09-29 | Stop reason: HOSPADM

## 2023-09-25 RX ORDER — MAGNESIUM SULFATE 1 G/100ML
1000 INJECTION INTRAVENOUS ONCE
Status: COMPLETED | OUTPATIENT
Start: 2023-09-25 | End: 2023-09-25

## 2023-09-25 RX ADMIN — CALCIUM CARBONATE 500 MG: 500 TABLET, CHEWABLE ORAL at 21:57

## 2023-09-25 RX ADMIN — Medication 1 MG: at 08:56

## 2023-09-25 RX ADMIN — AMLODIPINE BESYLATE 10 MG: 10 TABLET ORAL at 08:55

## 2023-09-25 RX ADMIN — ACETAMINOPHEN 650 MG: 325 TABLET ORAL at 00:48

## 2023-09-25 RX ADMIN — HEPARIN SODIUM 5000 UNITS: 10000 INJECTION INTRAVENOUS; SUBCUTANEOUS at 16:44

## 2023-09-25 RX ADMIN — HEPARIN SODIUM 5000 UNITS: 10000 INJECTION INTRAVENOUS; SUBCUTANEOUS at 08:56

## 2023-09-25 RX ADMIN — MAGNESIUM SULFATE HEPTAHYDRATE 1000 MG: 1 INJECTION, SOLUTION INTRAVENOUS at 10:57

## 2023-09-25 RX ADMIN — PANTOPRAZOLE SODIUM 40 MG: 40 TABLET, DELAYED RELEASE ORAL at 08:55

## 2023-09-25 RX ADMIN — DEXTROSE AND SODIUM CHLORIDE: 5; 450 INJECTION, SOLUTION INTRAVENOUS at 16:40

## 2023-09-25 RX ADMIN — SODIUM CHLORIDE, PRESERVATIVE FREE 10 ML: 5 INJECTION INTRAVENOUS at 21:57

## 2023-09-25 RX ADMIN — SODIUM CHLORIDE, PRESERVATIVE FREE 10 ML: 5 INJECTION INTRAVENOUS at 09:02

## 2023-09-25 RX ADMIN — Medication: at 09:02

## 2023-09-25 RX ADMIN — Medication: at 21:57

## 2023-09-25 RX ADMIN — MICONAZOLE NITRATE: 20.6 POWDER TOPICAL at 21:57

## 2023-09-25 RX ADMIN — DEXAMETHASONE 10 MG: 4 TABLET ORAL at 21:56

## 2023-09-25 RX ADMIN — LABETALOL HYDROCHLORIDE 10 MG: 5 INJECTION INTRAVENOUS at 08:57

## 2023-09-25 RX ADMIN — HEPARIN SODIUM 5000 UNITS: 10000 INJECTION INTRAVENOUS; SUBCUTANEOUS at 00:48

## 2023-09-25 RX ADMIN — ACETAMINOPHEN 650 MG: 325 TABLET ORAL at 10:46

## 2023-09-25 RX ADMIN — Medication 5 ML: at 10:43

## 2023-09-25 RX ADMIN — Medication 100 MG: at 08:56

## 2023-09-25 RX ADMIN — DEXAMETHASONE 10 MG: 4 TABLET ORAL at 14:32

## 2023-09-25 RX ADMIN — MICONAZOLE NITRATE: 20.6 POWDER TOPICAL at 09:01

## 2023-09-25 RX ADMIN — CALCIUM CARBONATE 500 MG: 500 TABLET, CHEWABLE ORAL at 11:11

## 2023-09-25 ASSESSMENT — PAIN DESCRIPTION - LOCATION: LOCATION: FOOT

## 2023-09-25 ASSESSMENT — PAIN DESCRIPTION - DESCRIPTORS: DESCRIPTORS: ACHING;DISCOMFORT;SORE

## 2023-09-25 ASSESSMENT — PAIN DESCRIPTION - ORIENTATION: ORIENTATION: RIGHT;LEFT

## 2023-09-25 ASSESSMENT — PAIN SCALES - GENERAL: PAINLEVEL_OUTOF10: 3

## 2023-09-25 ASSESSMENT — PAIN - FUNCTIONAL ASSESSMENT: PAIN_FUNCTIONAL_ASSESSMENT: PREVENTS OR INTERFERES SOME ACTIVE ACTIVITIES AND ADLS

## 2023-09-25 NOTE — PROGRESS NOTES
[address]                                                  Patient Name: Clarita Ty    MRN: [id number]    : 1970    Room: [address] [place] [address]          Evaluating OT: Jose Campbell OTR/L; [id number]       Referring Provider: Saba Maciel MD    Specific Provider Orders/Date: OT Eval and Treat 23       Diagnosis: Acute kidney injury; Acute renal failure. Pt found down in apartment by family PTA. Surgery: None this admission     Pertinent Medical History:  has a past medical history of Alcohol use, Essential hypertension, and Lumbar radiculopathy.      Recommended Adaptive Equipment: BSC, AE LB dressing/bathing/toileting PRN     Precautions:  Fall Risk, abner feet wounds/skin integrity, incontinent, external catheter, clear liquid diet     Assessment of current deficits    [x] Functional mobility  [x]ADLs  [x] Strength               []Cognition    [x] Functional transfers   [x] IADLs         [x] Safety Awareness   [x]Endurance    [x] Fine Coordination              [x] Balance      [] Vision/perception   []Sensation     []Gross Motor Coordination  [] ROM  [] Delirium                   [] Motor Control     OT PLAN OF CARE   OT POC based on physician orders, patient diagnosis and results of clinical assessment    Frequency/Duration 1-3 days/wk for 2 weeks PRN   Specific OT Treatment Interventions to include:   * Instruction/training on adapted ADL techniques and AE recommendations to increase functional independence within precautions       * Training on energy conservation strategies, correct breathing pattern and techniques to improve independence/tolerance for self-care routine  * Functional transfer/mobility training/DME recommendations for increased independence, safety, and fall prevention  * Patient/Family education to increase follow

## 2023-09-25 NOTE — CARE COORDINATION
9/25/23  Transition of care Update. Patient admitted for acute renal failure. CRE noted to be 1.0 today. BP's are running high per nursing. Patient has a consult to general surgery to assess skin blister on left ankle to r/o SJS vs pemphigus vulgaris. Patient also has a consult to Otolryngology for tongue lesions. Patient was evalauted by therapy and was a 12/24 requiring max assist with transfers and was only able to ambulate 2 feet with mod assist.  Spoke with reinaldo and his significant other Neo Ba regarding a M[facility] stay. Patient is agreeable and asked to review the [facility] list which was left with patient to review. SW/CM to follow making referrals once [facility] choices are provided.       Electronically signed by DEMARIO Geiger on 9/25/2023 at 12:27 PM

## 2023-09-25 NOTE — PLAN OF CARE
Problem: Discharge Planning  Goal: Discharge to home or other facility with appropriate resources  9/25/2023 0740 by Reyna Mccrary RN  Outcome: Progressing  9/25/2023 0153 by Caitlin Olivarez RN  Outcome: Progressing     Problem: Safety - Adult  Goal: Free from fall injury  9/25/2023 0740 by Reyna Mccrary RN  Outcome: Progressing  9/25/2023 0153 by Caitlin Olivarez RN  Outcome: Progressing     Problem: Pain  Goal: Verbalizes/displays adequate comfort level or baseline comfort level  9/25/2023 0740 by Reyna Mccrary RN  Outcome: Progressing  9/25/2023 0153 by Caitlin Olivarez RN  Outcome: Progressing     Problem: Skin/Tissue Integrity  Goal: Absence of new skin breakdown  Description: 1. Monitor for areas of redness and/or skin breakdown  2. Assess vascular access sites hourly  3. Every 4-6 hours minimum:  Change oxygen saturation probe site  4. Every 4-6 hours:  If on nasal continuous positive airway pressure, respiratory therapy assess nares and determine need for appliance change or resting period.   9/25/2023 0740 by Reyna Mccrary RN  Outcome: Progressing  9/25/2023 0153 by Caitlin Olivarez RN  Outcome: Progressing     Problem: Nutrition Deficit:  Goal: Optimize nutritional status  9/25/2023 0740 by Reyna Mccrary RN  Outcome: Progressing  9/25/2023 0153 by Caitlin Olivarez RN  Outcome: Progressing     Problem: ABCDS Injury Assessment  Goal: Absence of physical injury  Outcome: Progressing  Flowsheets (Taken 9/24/2023 1950 by Caitlin Olivarez RN)  Absence of Physical Injury: Implement safety measures based on patient assessment

## 2023-09-25 NOTE — PROGRESS NOTES
I attempted to call the dermatology consult to Dr. Marion Millard office, per office staff, they do not come to the hospital.

## 2023-09-25 NOTE — PLAN OF CARE
Problem: Discharge Planning  Goal: Discharge to home or other facility with appropriate resources  9/25/2023 0153 by Nguyen Diana RN  Outcome: Progressing     Problem: Safety - Adult  Goal: Free from fall injury  9/25/2023 0153 by Nguyen Diana RN  Outcome: Progressing     Problem: Pain  Goal: Verbalizes/displays adequate comfort level or baseline comfort level  9/25/2023 0153 by Nguyen Diana RN  Outcome: Progressing     Problem: Skin/Tissue Integrity  Goal: Absence of new skin breakdown  Description: 1. Monitor for areas of redness and/or skin breakdown  2. Assess vascular access sites hourly  3. Every 4-6 hours minimum:  Change oxygen saturation probe site  4. Every 4-6 hours:  If on nasal continuous positive airway pressure, respiratory therapy assess nares and determine need for appliance change or resting period.   9/25/2023 0153 by Nguyen Diana RN  Outcome: Progressing     Problem: Nutrition Deficit:  Goal: Optimize nutritional status  9/25/2023 0153 by Nguyen Diana RN  Outcome: Progressing

## 2023-09-25 NOTE — PROGRESS NOTES
[facility] MD Yunier Adams MD Sylvie Chiquito, MD  Progress Note    9/25/2023    SUBJECTIVE:   9/22: Seen in his room comfortable on RA    9/23: Resting comfortably. Asleep. Easily awakened. No new issue or complaint. Oral intake has improved. 9/25 charts reviewed   Stable vitals  PROBLEM LIST:    Principal Problem:    Acute kidney injury ([address])  Active Problems:    Acute renal failure ([address])  Resolved Problems:    * No resolved hospital problems. *       DIET:    ADULT ORAL NUTRITION SUPPLEMENT; Lunch, Dinner; Frozen Oral Supplement  ADULT ORAL NUTRITION SUPPLEMENT; Breakfast, Dinner; Wound Healing Oral Supplement  ADULT DIET; Clear Liquid; 3 carb choices (45 gm/meal)       Allergies : Medrol [methylprednisolone]    Past Medical History:   Diagnosis Date    Alcohol use     Essential hypertension 04/06/2020    Lumbar radiculopathy 04/06/2020       No past surgical history on file. Family History   Problem Relation Age of Onset    Cancer Mother     Asthma Mother     Hypertension Father         reports that he has never smoked. He has never used smokeless tobacco. He reports current alcohol use of about 5.0 standard drinks of alcohol per week. He reports that he does not use drugs. Review of Systems:   Constitutional: no fevers , no chills , feels ok   Eyes: no eye pain , no itching , no drainage  Ears, nose, mouth, throat, and face: no ear ,nose pain , hearing is ok ,no nasal drainage   Respiratory: no sob ,no cough ,no wheezing . Cardiovascular: no chest pain , no palpitation ,no sob . Gastrointestinal: no nausea, vomiting , constipation , no abdominal pain . Genitourinary:no urinary retention , no burning , dysuria . No polyuria   Hematologic/lymphatic: no bleeding , no cougulation issues . Musculoskeletal:no joint pain , no swelling . Neurological: no headaches ,no weakness , no numbness . Endocrine: no thirst , no weight issues .      MEDS 9/25/2023 1925  Last data filed at 9/25/2023 1451  Gross per 24 hour   Intake 909.93 ml   Output 900 ml   Net 9.93 ml           Wt Readings from Last 3 Encounters:   09/25/23 173 lb 12.8 oz (78.8 kg)   08/11/23 185 lb (83.9 kg)   11/23/22 176 lb 2 oz (79.9 kg)       Constitutional:  in no acute distress  Oral: mucus membranes moist  Neck: no JVD  Cardiovascular: S1, S2 regular rhythm, no murmur,or rub  Respiratory:  No crackles, no wheeze  Gastrointestinal:  Soft, nontender, nondistended, NABS  Ext: no edema, feet warm  Skin: dry, no rash  Neuro: awake, alert, interactive      DATA:    Recent Labs     09/23/23  0446 09/24/23  0647 09/24/23  1447 09/25/23  0557 09/25/23  1607   WBC 11.3 9.8  --  10.0  --    HGB 10.3* 11.1* 10.3* 9.6* 10.4*   HCT 32.1* 34.6* 32.2* 29.4* 31.3*   MCV 89.7 92.3  --  90.2  --    PLT  --  126*  --  111*  --        Recent Labs     09/23/23  0446 09/24/23  0415 09/24/23  0647 09/25/23  0557   * 144  --  138   K 4.2 3.7  --  3.8   * 112*  --  105   CO2 24 24  --  23   MG  --   --  1.2* 1.5*   PHOS  --   --  2.2* 2.6   BUN 36* 17  --  13   CREATININE 1.2 1.1  --  1.0   ALT  --   --   --  21   AST  --   --   --  18   BILITOT  --   --   --  0.3   ALKPHOS  --   --   --  57         No results found for: \"LABPROT\"    ASSESSMENT       1. Acute kidney injury appeared to be mainly related to decreased  effective volume. With this degree of azotemia, cannot rule out  underlying ATN. His CT scan of the abdomen showing no hydronephrosis. He has a Barrientos in a place with okay urine output. At this point, I  would continue IV fluid resuscitation and followup serial basic  metabolic panel and urine output. I will check urinalysis along with  spot urine studies to quantify presence of proteinuria. Continue to  hold his nephrotoxic medication at home including Zestril and  hydrochlorothiazide, which might have exacerbated his underlying PAULINA. 2.  Mild rhabdomyolysis.     3.  History of alcoholism. 4.  Hypocalcemia would be gentle and replacement.     Azotemia improving briskly  Hyponatremia improving  Potassium normal  Bp stable, though elevated      RECOMMENDATIONS  Change iv fluids --1/2 NS  Check BMP this evening, then change to daily barring need to make adjustments  Continue supprotive care   Follow labs, UO      Electronically signed by Mahesh Tapia MD on 9/25/2023 at 7:25 PM

## 2023-09-25 NOTE — PROGRESS NOTES
Will plan for punch biopsy today. Patient doing well and resting in bed comfortably this morning. States the lidocaine mouthwash has been beneficial for him. General surgery was also requested to evaluate the patient for melena and anemia. Patient currently denying any symptoms and per chart review has had dark brown stool, unlike melena. Although FOBT+ and stool study was positive, patient has remained hemodynamically stable and hgb has remained above 10. Of note patient has had a drop in plts from 226 on 9/19 to 126 today; patient is on Heparin DVT prophylaxis.      Electronically signed by Harry Ojeda DO on 9/25/2023 at 5:56 AM

## 2023-09-25 NOTE — PATIENT CARE CONFERENCE
P Quality Flow/Interdisciplinary Rounds Progress Note        Quality Flow Rounds held on September 25, 2023    Disciplines Attending:  Bedside Nurse, , , and Nursing Unit Leadership    Teresa Lebron was admitted on 9/19/2023  5:34 PM    Anticipated Discharge Date:       Disposition:    Gordon Score:  Gordon Scale Score: 13    Readmission Risk              Risk of Unplanned Readmission:  14           Discussed patient goal for the day, patient clinical progression, and barriers to discharge.   The following Goal(s) of the Day/Commitment(s) have been identified:  discharge 1941 Louise Jenkins RN  September 25, 2023

## 2023-09-25 NOTE — PROGRESS NOTES
[facility] Inpatient   Resident Progress Note    S:  Hospital day: 6   Brief Synopsis: Clarita yT is a 46 y.o. male with a PMH of hypertension, GERD, depression, alcohol use disorder and chronic low back pain who presents emergency department for altered mental status after being discovered on the floor at home. Patient states that he had been laying there for 2 days. Patient denied faling, syncope or near syncope. Work up revealed significant PAULINA, Rhabdomyolysis with CK >93,482 ,  metabolic acidosis, hyperglycemia, elevated lipase, cortisol and WBC. CT head, CT Cervical, thoracic and lumbar spine were WNL, CT chest revealed findings concerning for emphysematous COPD, CT Abdomen revealed mild sigmoid diverticulosis. Patient was also hypokalemic, hypocalcemic, hypophosphatemic. Blood and urine cultures, UA and urine studies were sent. He was started on IV fluids, bicarbonate drip and KCL drip. He also received a replenishment with calcium gluconate. Patient's home medications including amitriptyline, sertraline, gabapentin, HCTZ and lisinopril were held. Patient's has resolved. Patient has developed new oral mucosal lesions and painful blisters/bullae on ankles. Dermatology consult unavailable in patient, ENT and general surgery consulted. Punch biopsy for bullae on ankles planned for 9/25. Overnight/interim:   No acute events overnight  Patient still has diarrhea. He is unable to have food due to pain caused by oral lesions. Does have hunger. Reports that he is feeling better overall.   Denies chest pain, n/v, abdominal pain, fever or chills        Cont meds:    dextrose 5 % and 0.45 % NaCl 75 mL/hr at 09/25/23 1640    sodium chloride      dextrose       Scheduled meds:    dexamethasone  10 mg Oral 3 times per day    amLODIPine  10 mg Oral Daily    miconazole nitrate   Topical BID    lidocaine-EPINEPHrine  20 mL IntraDERmal Once    miconazole   Topical BID    [Held by provider] on 9/25/2023 at 7:41 PM  Attending physician: Dr. Urrutia Friends

## 2023-09-25 NOTE — PROGRESS NOTES
Physical Therapy  Physical Therapy Initial Assessment     Name: Osman Chester  : 1970  MRN: [id number]      Date of Service: 2023    Evaluating PT:  Cali Irving PT, DPT    Room #:  3735/8622-O  Diagnosis:  Acute renal failure ([address]) [N17.9]  Acute kidney injury ([address]) [N17.9]  Traumatic rhabdomyolysis, initial encounter ([address]) [place] 6XXA]  Acute renal failure, unspecified acute renal failure type ([address]) [N17.9]  PMHx/PSHx:  EtOH abuse, HTN, lumbar radiculopathy  Procedure/Surgery:  N/A  Precautions:  fall risk, BLE wounds/blisters  Equipment Needs:  TBD    SUBJECTIVE:    Pt lives alone in a 3rd floor apt with 3 flights of steps to access with B handrail. Pt ambulated with no AD PTA. OBJECTIVE:   Initial Evaluation  Date: 23 Treatment Short Term/ Long Term   Goals   AM-PAC 6 Clicks      Was pt agreeable to Eval/treatment? yes     Does pt have pain?  10/10 generalized     Bed Mobility  Rolling: min A  Supine to sit: min A  Sit to supine: NT  Scooting: min A  Rolling: mod I  Supine to sit: mod I  Sit to supine: mod I  Scooting: mod I   Transfers Sit to stand: max A  Stand to sit: min A  Stand pivot: mod A with arm in arm A  Sit to stand: mod I  Stand to sit: mod I  Stand pivot: mod I with AAD   Ambulation    2' with arm in arm A mod A  150'+ with AAD mod I   Stair negotiation: ascended and descended  NT  10x3 steps with 1 handrail mod I     Strength/ROM:   BLE grossly 3+/5  BLE AROM WFL    Balance:   Static Sitting: SBA  Dynamic Sitting: CGA  Static Standing: min A with no AD  Dynamic Standing: mod A with no AD    Pt is A & O x 3  Sensation:  Pt denies numbness and tingling to extremities  Edema:  unremarkable    Vitals:  SpO2 and HR were stable during session    Therapeutic Exercises:    Bed mobility: supine>sit, cued for EOB positioning  Transfers: STS x1, stand pivot x1, cued for hand placement and postural correction  Ambulation: 2' with no AD  BLE AROM    Patient education  [place] educated on [address]  [] Moderate Complexity PT evaluation 58621  [] High Complexity PT evaluation [id number]  [] PT Re-evaluation [id number]  [] Gait training 42870 -- minutes  [] Manual therapy 33058 -- minutes  [x] Therapeutic activities 33159 10 minutes  [] Therapeutic exercises 84957 -- minutes  [] Neuromuscular reeducation 73455 -- minutes     Ronda Tiwari, PT, DPT  [id number]

## 2023-09-25 NOTE — PROCEDURES
After informed verbal consent was obtained, using Betadine for cleansing and 1% Xylocaine without epinephrine for anesthetic, with sterile technique a 4 mm punch biopsy was used to obtain a biopsy specimen of the lesion. Hemostasis was obtained by pressure and wound was closed with interrupted nylon sutured. Dressing applied, and wound care instructions provided. Be alert for any signs of cutaneous infection. The specimen is labeled and sent to pathology for evaluation. The procedure was well tolerated without complications. Dr. Miriam Jeffers was available throughout the entire procedure.      John Mares DO  9/25/2023

## 2023-09-26 LAB
ANION GAP SERPL CALCULATED.3IONS-SCNC: 8 MMOL/L (ref 7–16)
BASOPHILS # BLD: 0.01 K/UL (ref 0–0.2)
BASOPHILS NFR BLD: 0 % (ref 0–2)
BUN SERPL-MCNC: 16 MG/DL (ref 6–20)
CALCIUM SERPL-MCNC: 8.1 MG/DL (ref 8.6–10.2)
CHLORIDE SERPL-SCNC: 97 MMOL/L (ref 98–107)
CO2 SERPL-SCNC: 24 MMOL/L (ref 22–29)
CREAT SERPL-MCNC: 1 MG/DL (ref 0.7–1.2)
EOSINOPHIL # BLD: 0 K/UL (ref 0.05–0.5)
EOSINOPHILS RELATIVE PERCENT: 0 % (ref 0–6)
ERYTHROCYTE [DISTWIDTH] IN BLOOD BY AUTOMATED COUNT: 14.7 % (ref 11.5–15)
GFR SERPL CREATININE-BSD FRML MDRD: >60 ML/MIN/1.73M2
GLUCOSE SERPL-MCNC: 124 MG/DL (ref 74–99)
HCT VFR BLD AUTO: 29 % (ref 37–54)
HGB BLD-MCNC: 9.6 G/DL (ref 12.5–16.5)
IMM GRANULOCYTES # BLD AUTO: 0.27 K/UL (ref 0–0.58)
IMM GRANULOCYTES NFR BLD: 3 % (ref 0–5)
LYMPHOCYTES NFR BLD: 0.62 K/UL (ref 1.5–4)
LYMPHOCYTES RELATIVE PERCENT: 7 % (ref 20–42)
MCH RBC QN AUTO: 28.9 PG (ref 26–35)
MCHC RBC AUTO-ENTMCNC: 33.1 G/DL (ref 32–34.5)
MCV RBC AUTO: 87.3 FL (ref 80–99.9)
MICROORGANISM SPEC CULT: NORMAL
MONOCYTES NFR BLD: 0.31 K/UL (ref 0.1–0.95)
MONOCYTES NFR BLD: 4 % (ref 2–12)
NEUTROPHILS NFR BLD: 86 % (ref 43–80)
NEUTS SEG NFR BLD: 7.2 K/UL (ref 1.8–7.3)
PLATELET # BLD AUTO: 155 K/UL (ref 130–450)
PMV BLD AUTO: 12.4 FL (ref 7–12)
POTASSIUM SERPL-SCNC: 3.9 MMOL/L (ref 3.5–5)
RBC # BLD AUTO: 3.32 M/UL (ref 3.8–5.8)
RPR SER QL: NONREACTIVE
SODIUM SERPL-SCNC: 129 MMOL/L (ref 132–146)
SPECIMEN DESCRIPTION: NORMAL
WBC OTHER # BLD: 8.4 K/UL (ref 4.5–11.5)

## 2023-09-26 PROCEDURE — 6360000002 HC RX W HCPCS

## 2023-09-26 PROCEDURE — 85025 COMPLETE CBC W/AUTO DIFF WBC: CPT

## 2023-09-26 PROCEDURE — 2140000000 HC CCU INTERMEDIATE R&B

## 2023-09-26 PROCEDURE — 6370000000 HC RX 637 (ALT 250 FOR IP)

## 2023-09-26 PROCEDURE — 36415 COLL VENOUS BLD VENIPUNCTURE: CPT

## 2023-09-26 PROCEDURE — 2580000003 HC RX 258

## 2023-09-26 PROCEDURE — 80048 BASIC METABOLIC PNL TOTAL CA: CPT

## 2023-09-26 PROCEDURE — 99232 SBSQ HOSP IP/OBS MODERATE 35: CPT | Performed by: FAMILY MEDICINE

## 2023-09-26 RX ORDER — PREDNISONE 20 MG/1
40 TABLET ORAL
Status: DISCONTINUED | OUTPATIENT
Start: 2023-09-27 | End: 2023-09-29 | Stop reason: HOSPADM

## 2023-09-26 RX ADMIN — Medication 1 MG: at 09:04

## 2023-09-26 RX ADMIN — HEPARIN SODIUM 5000 UNITS: 10000 INJECTION INTRAVENOUS; SUBCUTANEOUS at 17:08

## 2023-09-26 RX ADMIN — ACETAMINOPHEN 650 MG: 325 TABLET ORAL at 20:41

## 2023-09-26 RX ADMIN — AMLODIPINE BESYLATE 10 MG: 10 TABLET ORAL at 09:04

## 2023-09-26 RX ADMIN — DEXAMETHASONE 10 MG: 4 TABLET ORAL at 06:51

## 2023-09-26 RX ADMIN — Medication 100 MG: at 09:04

## 2023-09-26 RX ADMIN — CALCIUM CARBONATE 500 MG: 500 TABLET, CHEWABLE ORAL at 17:08

## 2023-09-26 RX ADMIN — Medication: at 22:00

## 2023-09-26 RX ADMIN — LISINOPRIL 20 MG: 20 TABLET ORAL at 09:10

## 2023-09-26 RX ADMIN — HYDROCHLOROTHIAZIDE 25 MG: 25 TABLET ORAL at 09:10

## 2023-09-26 RX ADMIN — MICONAZOLE NITRATE: 20.6 POWDER TOPICAL at 10:12

## 2023-09-26 RX ADMIN — ACETAMINOPHEN 650 MG: 325 TABLET ORAL at 07:28

## 2023-09-26 RX ADMIN — PANTOPRAZOLE SODIUM 40 MG: 40 TABLET, DELAYED RELEASE ORAL at 09:04

## 2023-09-26 RX ADMIN — HEPARIN SODIUM 5000 UNITS: 10000 INJECTION INTRAVENOUS; SUBCUTANEOUS at 09:05

## 2023-09-26 RX ADMIN — SODIUM CHLORIDE, PRESERVATIVE FREE 10 ML: 5 INJECTION INTRAVENOUS at 09:10

## 2023-09-26 RX ADMIN — SODIUM CHLORIDE, PRESERVATIVE FREE 10 ML: 5 INJECTION INTRAVENOUS at 20:42

## 2023-09-26 RX ADMIN — MICONAZOLE NITRATE: 20.6 POWDER TOPICAL at 22:00

## 2023-09-26 RX ADMIN — Medication: at 10:15

## 2023-09-26 ASSESSMENT — PAIN SCALES - GENERAL: PAINLEVEL_OUTOF10: 4

## 2023-09-26 ASSESSMENT — PAIN - FUNCTIONAL ASSESSMENT: PAIN_FUNCTIONAL_ASSESSMENT: ACTIVITIES ARE NOT PREVENTED

## 2023-09-26 ASSESSMENT — PAIN DESCRIPTION - LOCATION: LOCATION: THROAT

## 2023-09-26 ASSESSMENT — PAIN DESCRIPTION - DESCRIPTORS: DESCRIPTORS: ACHING;DISCOMFORT;SORE;BURNING

## 2023-09-26 ASSESSMENT — PAIN DESCRIPTION - PAIN TYPE: TYPE: ACUTE PAIN

## 2023-09-26 NOTE — PATIENT CARE CONFERENCE
[facility] Quality Flow/Interdisciplinary Rounds Progress Note        Quality Flow Rounds held on September 26, 2023    Disciplines Attending:  Bedside Nurse, , , and Nursing Unit Leadership    Teresa Lebron was admitted on 9/19/2023  5:34 PM    Anticipated Discharge Date:       Disposition:    Gordon Score:  Godron Scale Score: 14    Readmission Risk              Risk of Unplanned Readmission:  15           Discussed patient goal for the day, patient clinical progression, and barriers to discharge.   The following Goal(s) of the Day/Commitment(s) have been identified:  downgrade/discharge plan      Luke Garcia RN  September 26, 2023

## 2023-09-26 NOTE — PROGRESS NOTES
[facility] Inpatient   Resident Progress Note    S:  Hospital day: 7   Brief Synopsis: Akosua Bergman is a 46 y.o. male with a PMH of hypertension, GERD, depression, alcohol use disorder and chronic low back pain who presented to the emergency department for altered mental status after being discovered on the floor at home. Patient states that he had been laying there for 2 days. Patient denied faling, syncope or near syncope. Work up revealed significant PAULINA, Rhabdomyolysis with CK >51,078, metabolic acidosis, hyperglycemia, elevated lipase, cortisol and WBC. CT head, CT Cervical, thoracic and lumbar spine were WNL, CT chest revealed findings concerning for emphysematous COPD, CT Abdomen revealed mild sigmoid diverticulosis. Additionally, patient had several electrolyte derangements. He admitted to being a chronic alcohol abuser therefore was started on CIWA. He developed new oral mucosal lesions and painful blisters/bullae on ankles. Dermatology consult unavailable inpatient. ENT and general surgery were consulted. ENT recommended steroids. Surgery performed a punch biopsy on 9/25/23. Patient's diet was advanced as-tolerated due to mouth/throat discomfort. IV fluids were discontinued on 9/26/23 and his anti-HTN medications were resumed.      Overnight/interim:   No acute events overnight  States diarrhea is improved  States the oral pain he was experiencing has improved  No new bullae or lesions per patient   He is tolerating a full liquid diet and would like his diet advanced   Denies chest pain, SOB, n/v, abdominal pain, fever or chills        Cont meds:    sodium chloride      dextrose       Scheduled meds:    [START ON 9/27/2023] predniSONE  40 mg Oral Lunch    amLODIPine  10 mg Oral Daily    miconazole nitrate   Topical BID    lidocaine-EPINEPHrine  20 mL IntraDERmal Once    miconazole   Topical BID    [Held by provider] amitriptyline  25 mg Oral Nightly    [Held by provider] gabapentin Pupils are equal, round, and reactive to light. Cardiovascular:      Rate and Rhythm: Normal rate and regular rhythm. Pulses: Normal pulses. Heart sounds: Normal heart sounds. No murmur heard. No gallop. Pulmonary:      Effort: Pulmonary effort is normal.      Breath sounds: No rhonchi. Abdominal:      General: Abdomen is flat. There is no distension. Palpations: Abdomen is soft. There is no mass. Tenderness: There is no abdominal tenderness. There is no guarding. Musculoskeletal:         General: Tenderness (Tender and sensitive to touch in his legs, arms) present. No swelling. Normal range of motion. Cervical back: Normal range of motion. No tenderness. Right lower leg: Edema (2+ at ankle level) present. Left lower leg: Edema (2+ at ankle level) present. Lymphadenopathy:      Cervical: No cervical adenopathy. Skin:     General: Skin is warm and dry. Findings: Lesion (Ulcers on tocks and perianal area) present. Neurological:      General: No focal deficit present. Mental Status: He is alert and oriented to person, place, and time. Psychiatric:         Mood and Affect: Mood normal.         Behavior: Behavior normal.               Labs:  Na/K/Cl/CO2:  138/3.8/105/23 (09/25 0557)  BUN/Cr/glu/ALT/AST/amyl/lip:  13/1.0/--/21/18/--/-- (09/25 0557)  WBC/Hgb/Hct/Plts:  8.4/9.6/29.0/155 (09/26 0540)  estimated creatinine clearance is 84 mL/min (based on SCr of 1 mg/dL). Other pertinent labs as noted below    Radiology:  XR CHEST (2 VW)   Final Result   No acute process. CT Head W/O Contrast   Final Result   No acute intracranial abnormality. CT CSpine W/O Contrast   Final Result   No acute abnormality of the cervical spine. CT THORACIC SPINE WO CONTRAST   Final Result   Unremarkable CT of the thoracic spine. CT LUMBAR SPINE WO CONTRAST   Final Result   Unremarkable non-contrast CT of the lumbar spine.          CT CHEST WO

## 2023-09-26 NOTE — PROGRESS NOTES
Readings from Last 3 Encounters:   09/26/23 176 lb 12.8 oz (80.2 kg)   08/11/23 185 lb (83.9 kg)   11/23/22 176 lb 2 oz (79.9 kg)       Constitutional:  in no acute distress  Oral: mucus membranes moist  Neck: no JVD  Cardiovascular: S1, S2 regular rhythm, no murmur,or rub  Respiratory:  No crackles, no wheeze  Gastrointestinal:  Soft, nontender, nondistended, NABS  Ext: no edema, feet warm  Skin: dry, no rash  Neuro: awake, alert, interactive      DATA:    Recent Labs     09/24/23  0647 09/24/23  1447 09/25/23  0557 09/25/23  1607 09/26/23  0540   WBC 9.8  --  10.0  --  8.4   HGB 11.1*   < > 9.6* 10.4* 9.6*   HCT 34.6*   < > 29.4* 31.3* 29.0*   MCV 92.3  --  90.2  --  87.3   *  --  111*  --  155    < > = values in this interval not displayed. Recent Labs     09/24/23  0415 09/24/23  0647 09/25/23  0557     --  138   K 3.7  --  3.8   *  --  105   CO2 24  --  23   MG  --  1.2* 1.5*   PHOS  --  2.2* 2.6   BUN 17  --  13   CREATININE 1.1  --  1.0   ALT  --   --  21   AST  --   --  18   BILITOT  --   --  0.3   ALKPHOS  --   --  57         No results found for: \"LABPROT\"    ASSESSMENT       1. Acute kidney injury appeared to be mainly related to decreased  effective volume. With this degree of azotemia, cannot rule out  underlying ATN. His CT scan of the abdomen showing no hydronephrosis. He has a Barrientos in a place with okay urine output. At this point, I  would continue IV fluid resuscitation and followup serial basic  metabolic panel and urine output. I will check urinalysis along with  spot urine studies to quantify presence of proteinuria. Continue to  hold his nephrotoxic medication at home including Zestril and  hydrochlorothiazide, which might have exacerbated his underlying PAULINA. 2.  Mild rhabdomyolysis. 3.  History of alcoholism. 4.  Hypocalcemia would be gentle and replacement.     Azotemia improving briskly  Hyponatremia improving  Potassium normal  Bp stable, though elevated      RECOMMENDATIONS    Continue supportive care   His electrolytes issues have resolved      Electronically signed by Rachell Olivas MD on 9/26/2023 at 10:15 AM

## 2023-09-26 NOTE — CARE COORDINATION
9/26/23   Transition of care Update. Patient admitted for acute renal failure. Diet is being advanced to soft today. Patient had a punch bx with general surgery yesterday due to concern for SJS vs pemphigus vulgaris. Metabolic encephalopathy and PAULINA have resolved. Patient was evalauted by therapy with AM-PAC score of 12/24 for PT  and 14/24 for OT requiring max assist with transfers and was only able to ambulate 2 feet with mod assist.  Spoke with patient and his significant other Stanislaw Nikolay regarding a [facility] stay. Patient is agreeable and would like referrals to 1. [facility], 2. [facility] and 3. [facility]. Call placed to [facility] and [facility] with referrals given and pending. MELANI/Brayden to follow for discharge planning once destination is determined.     Electronically signed by DEMARIO Lopez on 9/26/2023 at 11:50 AM

## 2023-09-26 NOTE — PROGRESS NOTES
Secured message sent to Dr. Henry De Los Santos via perfect serve: Patient is requesting an upgrade in his diet. He has been on clear liquids per his choice. He is now requesting a soft diet.

## 2023-09-27 LAB
ANION GAP SERPL CALCULATED.3IONS-SCNC: 12 MMOL/L (ref 7–16)
BASOPHILS # BLD: 0.03 K/UL (ref 0–0.2)
BASOPHILS NFR BLD: 0 % (ref 0–2)
BUN SERPL-MCNC: 17 MG/DL (ref 6–20)
CA-I BLD-SCNC: 1.1 MMOL/L (ref 1.15–1.33)
CALCIUM SERPL-MCNC: 7.7 MG/DL (ref 8.6–10.2)
CHLORIDE SERPL-SCNC: 98 MMOL/L (ref 98–107)
CO2 SERPL-SCNC: 23 MMOL/L (ref 22–29)
CREAT SERPL-MCNC: 1 MG/DL (ref 0.7–1.2)
EOSINOPHIL # BLD: 0.01 K/UL (ref 0.05–0.5)
EOSINOPHILS RELATIVE PERCENT: 0 % (ref 0–6)
ERYTHROCYTE [DISTWIDTH] IN BLOOD BY AUTOMATED COUNT: 14.9 % (ref 11.5–15)
FAT QUALITATIVE SPLIT STOOL: NORMAL
FECAL NEUTRAL FAT: NORMAL
GFR SERPL CREATININE-BSD FRML MDRD: >60 ML/MIN/1.73M2
GLUCOSE SERPL-MCNC: 90 MG/DL (ref 74–99)
HCT VFR BLD AUTO: 26.2 % (ref 37–54)
HCT VFR BLD AUTO: 28 % (ref 37–54)
HGB BLD-MCNC: 8.7 G/DL (ref 12.5–16.5)
HGB BLD-MCNC: 9.2 G/DL (ref 12.5–16.5)
IMM GRANULOCYTES # BLD AUTO: 0.11 K/UL (ref 0–0.58)
IMM GRANULOCYTES NFR BLD: 1 % (ref 0–5)
LYMPHOCYTES NFR BLD: 1.73 K/UL (ref 1.5–4)
LYMPHOCYTES RELATIVE PERCENT: 15 % (ref 20–42)
MCH RBC QN AUTO: 29.3 PG (ref 26–35)
MCHC RBC AUTO-ENTMCNC: 33.2 G/DL (ref 32–34.5)
MCV RBC AUTO: 88.2 FL (ref 80–99.9)
MONOCYTES NFR BLD: 1.02 K/UL (ref 0.1–0.95)
MONOCYTES NFR BLD: 9 % (ref 2–12)
NEUTROPHILS NFR BLD: 74 % (ref 43–80)
NEUTS SEG NFR BLD: 8.34 K/UL (ref 1.8–7.3)
PLATELET # BLD AUTO: 192 K/UL (ref 130–450)
PMV BLD AUTO: 11.7 FL (ref 7–12)
POTASSIUM SERPL-SCNC: 3.4 MMOL/L (ref 3.5–5)
PYRIDOXAL PHOS SERPL-SCNC: 15.1 NMOL/L (ref 20–125)
RBC # BLD AUTO: 2.97 M/UL (ref 3.8–5.8)
SODIUM SERPL-SCNC: 133 MMOL/L (ref 132–146)
WBC OTHER # BLD: 11.2 K/UL (ref 4.5–11.5)

## 2023-09-27 PROCEDURE — 85018 HEMOGLOBIN: CPT

## 2023-09-27 PROCEDURE — 6370000000 HC RX 637 (ALT 250 FOR IP)

## 2023-09-27 PROCEDURE — 82330 ASSAY OF CALCIUM: CPT

## 2023-09-27 PROCEDURE — 80048 BASIC METABOLIC PNL TOTAL CA: CPT

## 2023-09-27 PROCEDURE — 85014 HEMATOCRIT: CPT

## 2023-09-27 PROCEDURE — 99232 SBSQ HOSP IP/OBS MODERATE 35: CPT | Performed by: FAMILY MEDICINE

## 2023-09-27 PROCEDURE — 85025 COMPLETE CBC W/AUTO DIFF WBC: CPT

## 2023-09-27 PROCEDURE — 2580000003 HC RX 258

## 2023-09-27 PROCEDURE — 36415 COLL VENOUS BLD VENIPUNCTURE: CPT

## 2023-09-27 PROCEDURE — 1200000000 HC SEMI PRIVATE

## 2023-09-27 PROCEDURE — 6360000002 HC RX W HCPCS

## 2023-09-27 RX ADMIN — HEPARIN SODIUM 5000 UNITS: 10000 INJECTION INTRAVENOUS; SUBCUTANEOUS at 01:31

## 2023-09-27 RX ADMIN — PREDNISONE 40 MG: 20 TABLET ORAL at 13:04

## 2023-09-27 RX ADMIN — AMLODIPINE BESYLATE 10 MG: 10 TABLET ORAL at 08:52

## 2023-09-27 RX ADMIN — Medication: at 08:54

## 2023-09-27 RX ADMIN — HEPARIN SODIUM 5000 UNITS: 10000 INJECTION INTRAVENOUS; SUBCUTANEOUS at 08:55

## 2023-09-27 RX ADMIN — Medication: at 22:17

## 2023-09-27 RX ADMIN — MICONAZOLE NITRATE: 20.6 POWDER TOPICAL at 22:16

## 2023-09-27 RX ADMIN — SODIUM CHLORIDE, PRESERVATIVE FREE 10 ML: 5 INJECTION INTRAVENOUS at 08:53

## 2023-09-27 RX ADMIN — PANTOPRAZOLE SODIUM 40 MG: 40 TABLET, DELAYED RELEASE ORAL at 08:52

## 2023-09-27 RX ADMIN — LISINOPRIL 20 MG: 20 TABLET ORAL at 08:52

## 2023-09-27 RX ADMIN — SODIUM CHLORIDE, PRESERVATIVE FREE 10 ML: 5 INJECTION INTRAVENOUS at 22:17

## 2023-09-27 RX ADMIN — Medication 1 MG: at 08:52

## 2023-09-27 RX ADMIN — MICONAZOLE NITRATE: 20.6 POWDER TOPICAL at 08:53

## 2023-09-27 RX ADMIN — Medication 100 MG: at 08:52

## 2023-09-27 RX ADMIN — POTASSIUM BICARBONATE 20 MEQ: 782 TABLET, EFFERVESCENT ORAL at 08:52

## 2023-09-27 RX ADMIN — Medication 5 ML: at 07:00

## 2023-09-27 NOTE — PLAN OF CARE
Problem: Discharge Planning  Goal: Discharge to home or other facility with appropriate resources  9/27/2023 1136 by Ginny Zhang RN  Outcome: Progressing  9/27/2023 0555 by Corrin Gowers, RN  Outcome: Progressing  9/27/2023 0303 by Corrin Gowers, RN  Outcome: Progressing  Flowsheets (Taken 9/26/2023 2000)  Discharge to home or other facility with appropriate resources: Identify barriers to discharge with patient and caregiver     Problem: Safety - Adult  Goal: Free from fall injury  9/27/2023 1136 by Ginny Zhang RN  Outcome: Progressing  9/27/2023 0555 by Corrin Gowers, RN  Outcome: Progressing  9/27/2023 0303 by Corrin Gowers, RN  Outcome: Progressing  Flowsheets (Taken 9/26/2023 2000)  Free From Fall Injury: Instruct family/caregiver on patient safety     Problem: Pain  Goal: Verbalizes/displays adequate comfort level or baseline comfort level  9/27/2023 1136 by Ginny Zhang RN  Outcome: Progressing  9/27/2023 0555 by Corrin Gowers, RN  Outcome: Progressing  9/27/2023 0303 by Corrin Gowers, RN  Outcome: Progressing     Problem: Skin/Tissue Integrity  Goal: Absence of new skin breakdown  Description: 1. Monitor for areas of redness and/or skin breakdown  2. Assess vascular access sites hourly  3. Every 4-6 hours minimum:  Change oxygen saturation probe site  4. Every 4-6 hours:  If on nasal continuous positive airway pressure, respiratory therapy assess nares and determine need for appliance change or resting period.   9/27/2023 0555 by Corrin Gowers, RN  Outcome: Progressing  9/27/2023 0303 by Corrin Gowers, RN  Outcome: Progressing     Problem: Nutrition Deficit:  Goal: Optimize nutritional status  9/27/2023 0555 by Corrin Gowers, RN  Outcome: Progressing  9/27/2023 0303 by Corrin Gowers, RN  Outcome: Progressing     Problem: ABCDS Injury Assessment  Goal: Absence of physical injury  9/27/2023 0555 by Corrin Gowers, RN  Outcome: Progressing  9/27/2023 0303 by Prateek Marie Johana Mcconnell RN  Outcome: Progressing  Flowsheets (Taken 9/26/2023 2000)  Absence of Physical Injury: Implement safety measures based on patient assessment

## 2023-09-27 NOTE — CARE COORDINATION
9/27/23  Transition of care Update. Patient admitted for acute renal failure. Patient had a punch bx with general surgery yesterday due to concern for SJS vs pemphigus vulgaris with results pending. Metabolic encephalopathy and PAULINA have resolved. Patient noted to have a drop in hemoglobin and awaiting plan. Patient was evalauted by therapy with AM-PAC score of 12/24 for PT  and 14/24 for OT . Spoke with patient and girlfriend regarding a [facility]ARY[facility] stay and both are agreeable. Referrals were called to [facility] who are unable to Accept. Patient has a referral still pending with [facility] and calls were placed to [facility]iaisons for [facility] and [facility] to review today. Patient will need a pre-cert once accepting facility is determined. MELANI/[facility] to follow. 10:00am [facility] can accept patient and are willing to accept pending pre-cert. SAL and destination updated. Ambulette started and on the soft chart . Hens 7000 will need completed once discharge order is in.    1:15pm  Family requesting to change Nursing facility to [facility] [address] or [facility]. Call placed to liaisons for both facilities to review with referrals given and pending.       Electronically signed by DEMARIO Pérez on 9/27/2023 at 9:38 AM

## 2023-09-27 NOTE — CARE COORDINATION
9/27/23  Transition of care Update. Patient admitted for acute renal failure. Patient had a punch bx with general surgery yesterday due to concern for SJS vs pemphigus vulgaris with results pending. Metabolic encephalopathy and PAULINA have resolved. Patient noted to have a drop in hemoglobin and awaiting plan. Patient was evalauted by therapy with AM-PAC score of 12/24 for PT  and 14/24 for OT . Spoke with patient and girlfriend regarding a [facility] stay and both are agreeable. Referrals were called to [facility] who are unable to Accept. Patient has a referral still pending with [facility] and calls were placed to [facility]iaisons for [facility] and [facility] to review today. Patient will need a pre-cert once accepting facility is determined. MELANI/[facility] to follow. 10:00am [facility] can accept patient and are willing to accept pending pre-cert. SAL and destination updated. Ambulette started and on the soft chart . Hens 7000 will need completed once discharge order is in.    1:15pm  Family requesting to change Nursing facility to [facility] [address] or [facility]. Call placed to liaisons for both facilities to review with referrals given and pending. 2:30 [facility] accepted and all family in agreement. [facility] pulling pre-cert and [facility] starting pre-cert. [facility] ok to accept pending a pre-cert.   SAL and destination updated as well as 7000    Electronically signed by DEMARIO Ivan on 9/27/2023 at 9:38 AM

## 2023-09-27 NOTE — PROGRESS NOTES
4 Eyes Skin Assessment     NAME:  Sathya Allen  YOB: 1970  MEDICAL RECORD NUMBER:  [id number]    The patient is being assessed for  Transfer to New Unit    I agree that at least one RN has performed a thorough Head to Toe Skin Assessment on the patient. ALL assessment sites listed below have been assessed. Areas assessed by both nurses:    Head, Face, Ears, Shoulders, Back, Chest, Arms, Elbows, Hands, Sacrum. Buttock, Coccyx, Ischium, Legs. Feet and Heels, and Under Medical Devices         Does the Patient have a Wound? Popped blistered areas on bilateral feet. Punch biopsy suture site left ankle. Dry, flaky calloused heels. Erosion buttocks and scrotum.  Abrasion to left wrist        Gordon Prevention initiated by RN: No - transfer, already in place  Wound Care Orders initiated by RN: No - transfer, already in place    Pressure Injury (Stage 3,4, Unstageable, DTI, NWPT, and Complex wounds) if present, place Wound referral order by RN under : No    New Ostomies, if present place, Ostomy referral order under : No     Nurse 1 eSignature: Electronically signed by Dontrell Parks RN on 9/27/23 at 7:47 PM EDT    **SHARE this note so that the co-signing nurse can place an eSignature**    Nurse 2 eSignature: {Esignature:[id number]}

## 2023-09-27 NOTE — PLAN OF CARE
Problem: Discharge Planning  Goal: Discharge to home or other facility with appropriate resources  Outcome: Progressing  Flowsheets (Taken 9/26/2023 2000)  Discharge to home or other facility with appropriate resources: Identify barriers to discharge with patient and caregiver     Problem: Safety - Adult  Goal: Free from fall injury  Outcome: Progressing  Flowsheets (Taken 9/26/2023 2000)  Free From Fall Injury: Instruct family/caregiver on patient safety     Problem: Pain  Goal: Verbalizes/displays adequate comfort level or baseline comfort level  Outcome: Progressing     Problem: Skin/Tissue Integrity  Goal: Absence of new skin breakdown  Description: 1. Monitor for areas of redness and/or skin breakdown  2. Assess vascular access sites hourly  3. Every 4-6 hours minimum:  Change oxygen saturation probe site  4. Every 4-6 hours:  If on nasal continuous positive airway pressure, respiratory therapy assess nares and determine need for appliance change or resting period.   Outcome: Progressing     Problem: Nutrition Deficit:  Goal: Optimize nutritional status  Outcome: Progressing     Problem: ABCDS Injury Assessment  Goal: Absence of physical injury  Outcome: Progressing  Flowsheets (Taken 9/26/2023 2000)  Absence of Physical Injury: Implement safety measures based on patient assessment

## 2023-09-27 NOTE — PLAN OF CARE
Problem: Discharge Planning  Goal: Discharge to home or other facility with appropriate resources  9/27/2023 0555 by Javier Gutierrez RN  Outcome: Progressing  9/27/2023 0303 by Javier Gutierrez RN  Outcome: Progressing  Flowsheets (Taken 9/26/2023 2000)  Discharge to home or other facility with appropriate resources: Identify barriers to discharge with patient and caregiver     Problem: Safety - Adult  Goal: Free from fall injury  9/27/2023 0555 by Javier Gutierrez RN  Outcome: Progressing  9/27/2023 0303 by Javier Gutierrez RN  Outcome: Progressing  Flowsheets (Taken 9/26/2023 2000)  Free From Fall Injury: Instruct family/caregiver on patient safety     Problem: Pain  Goal: Verbalizes/displays adequate comfort level or baseline comfort level  9/27/2023 0555 by Javier Gutierrez RN  Outcome: Progressing  9/27/2023 0303 by Javier Gutierrez RN  Outcome: Progressing     Problem: Skin/Tissue Integrity  Goal: Absence of new skin breakdown  Description: 1. Monitor for areas of redness and/or skin breakdown  2. Assess vascular access sites hourly  3. Every 4-6 hours minimum:  Change oxygen saturation probe site  4. Every 4-6 hours:  If on nasal continuous positive airway pressure, respiratory therapy assess nares and determine need for appliance change or resting period.   9/27/2023 0555 by Javier Gutierrez RN  Outcome: Progressing  9/27/2023 0303 by Javier Gutierrez RN  Outcome: Progressing     Problem: Nutrition Deficit:  Goal: Optimize nutritional status  9/27/2023 0555 by Javier Gutierrez RN  Outcome: Progressing  9/27/2023 0303 by Javier Gutierrez RN  Outcome: Progressing     Problem: ABCDS Injury Assessment  Goal: Absence of physical injury  9/27/2023 0555 by Javier Gutierrez RN  Outcome: Progressing  9/27/2023 0303 by Javier Gutierrez RN  Outcome: Progressing  Flowsheets (Taken 9/26/2023 2000)  Absence of Physical Injury: Implement safety measures based on patient assessment

## 2023-09-27 NOTE — PATIENT CARE CONFERENCE
P Quality Flow/Interdisciplinary Rounds Progress Note        Quality Flow Rounds held on September 27, 2023    Disciplines Attending:  Bedside Nurse, , , and Nursing Unit Leadership    Clarita Ty was admitted on 9/19/2023  5:34 PM    Anticipated Discharge Date:       Disposition:    Gordon Score:  Gordon Scale Score: 19    Readmission Risk              Risk of Unplanned Readmission:  15           Discussed patient goal for the day, patient clinical progression, and barriers to discharge.   The following Goal(s) of the Day/Commitment(s) have been identified:  downgrade/discharge plan      Zohreh Oviedo RN  September 27, 2023

## 2023-09-28 PROBLEM — N17.9 ACUTE RENAL FAILURE (HCC): Status: RESOLVED | Noted: 2023-09-19 | Resolved: 2023-09-28

## 2023-09-28 PROBLEM — N17.9 ACUTE KIDNEY INJURY (HCC): Status: RESOLVED | Noted: 2023-09-19 | Resolved: 2023-09-28

## 2023-09-28 LAB
25(OH)D3 SERPL-MCNC: 10.1 NG/ML (ref 30–100)
ANION GAP SERPL CALCULATED.3IONS-SCNC: 12 MMOL/L (ref 7–16)
ANION GAP SERPL CALCULATED.3IONS-SCNC: 15 MMOL/L (ref 7–16)
BASOPHILS # BLD: 0.01 K/UL (ref 0–0.2)
BASOPHILS NFR BLD: 0 % (ref 0–2)
BUN SERPL-MCNC: 14 MG/DL (ref 6–20)
BUN SERPL-MCNC: 17 MG/DL (ref 6–20)
CALCIUM SERPL-MCNC: 7.8 MG/DL (ref 8.6–10.2)
CALCIUM SERPL-MCNC: 8.1 MG/DL (ref 8.6–10.2)
CALPROTECTIN, FECAL: 1380 UG/G
CHLORIDE SERPL-SCNC: 101 MMOL/L (ref 98–107)
CHLORIDE SERPL-SCNC: 99 MMOL/L (ref 98–107)
CO2 SERPL-SCNC: 20 MMOL/L (ref 22–29)
CO2 SERPL-SCNC: 21 MMOL/L (ref 22–29)
CREAT SERPL-MCNC: 1 MG/DL (ref 0.7–1.2)
CREAT SERPL-MCNC: 1 MG/DL (ref 0.7–1.2)
EOSINOPHIL # BLD: 0 K/UL (ref 0.05–0.5)
EOSINOPHILS RELATIVE PERCENT: 0 % (ref 0–6)
ERYTHROCYTE [DISTWIDTH] IN BLOOD BY AUTOMATED COUNT: 15.3 % (ref 11.5–15)
ERYTHROCYTE [SEDIMENTATION RATE] IN BLOOD BY WESTERGREN METHOD: 64 MM/HR (ref 0–15)
FAT QUANTITATIVE STOOL: NORMAL
GFR SERPL CREATININE-BSD FRML MDRD: >60 ML/MIN/1.73M2
GFR SERPL CREATININE-BSD FRML MDRD: >60 ML/MIN/1.73M2
GLUCOSE SERPL-MCNC: 101 MG/DL (ref 74–99)
GLUCOSE SERPL-MCNC: 122 MG/DL (ref 74–99)
HCT VFR BLD AUTO: 28.3 % (ref 37–54)
HGB BLD-MCNC: 9.3 G/DL (ref 12.5–16.5)
IMM GRANULOCYTES # BLD AUTO: 0.11 K/UL (ref 0–0.58)
IMM GRANULOCYTES NFR BLD: 1 % (ref 0–5)
LENGTH OF COLLECTION: NORMAL
LYMPHOCYTES NFR BLD: 1.34 K/UL (ref 1.5–4)
LYMPHOCYTES RELATIVE PERCENT: 12 % (ref 20–42)
MAGNESIUM SERPL-MCNC: 1.5 MG/DL (ref 1.6–2.6)
MAGNESIUM SERPL-MCNC: 1.6 MG/DL (ref 1.6–2.6)
MCH RBC QN AUTO: 29.2 PG (ref 26–35)
MCHC RBC AUTO-ENTMCNC: 32.9 G/DL (ref 32–34.5)
MCV RBC AUTO: 88.7 FL (ref 80–99.9)
MONOCYTES NFR BLD: 1.32 K/UL (ref 0.1–0.95)
MONOCYTES NFR BLD: 12 % (ref 2–12)
NEUTROPHILS NFR BLD: 75 % (ref 43–80)
NEUTS SEG NFR BLD: 8.26 K/UL (ref 1.8–7.3)
PHOSPHATE SERPL-MCNC: 1.6 MG/DL (ref 2.5–4.5)
PHOSPHATE SERPL-MCNC: 1.9 MG/DL (ref 2.5–4.5)
PLATELET # BLD AUTO: 139 K/UL (ref 130–450)
PMV BLD AUTO: 12.3 FL (ref 7–12)
POTASSIUM SERPL-SCNC: 3.7 MMOL/L (ref 3.5–5)
POTASSIUM SERPL-SCNC: 4.2 MMOL/L (ref 3.5–5)
RBC # BLD AUTO: 3.19 M/UL (ref 3.8–5.8)
RHEUMATOID FACT SER NEPH-ACNC: <10 IU/ML (ref 0–13)
SEND OUT REPORT: NORMAL
SODIUM SERPL-SCNC: 132 MMOL/L (ref 132–146)
SODIUM SERPL-SCNC: 136 MMOL/L (ref 132–146)
STOOL WEIGHT: NORMAL
SURGICAL PATHOLOGY REPORT: NORMAL
TEST NAME: NORMAL
WBC OTHER # BLD: 11 K/UL (ref 4.5–11.5)

## 2023-09-28 PROCEDURE — 6370000000 HC RX 637 (ALT 250 FOR IP)

## 2023-09-28 PROCEDURE — 2500000003 HC RX 250 WO HCPCS

## 2023-09-28 PROCEDURE — 86225 DNA ANTIBODY NATIVE: CPT

## 2023-09-28 PROCEDURE — 97530 THERAPEUTIC ACTIVITIES: CPT

## 2023-09-28 PROCEDURE — 85652 RBC SED RATE AUTOMATED: CPT

## 2023-09-28 PROCEDURE — 86256 FLUORESCENT ANTIBODY TITER: CPT

## 2023-09-28 PROCEDURE — 99232 SBSQ HOSP IP/OBS MODERATE 35: CPT | Performed by: FAMILY MEDICINE

## 2023-09-28 PROCEDURE — 80048 BASIC METABOLIC PNL TOTAL CA: CPT

## 2023-09-28 PROCEDURE — 86038 ANTINUCLEAR ANTIBODIES: CPT

## 2023-09-28 PROCEDURE — 97535 SELF CARE MNGMENT TRAINING: CPT

## 2023-09-28 PROCEDURE — 2580000003 HC RX 258

## 2023-09-28 PROCEDURE — 86812 HLA TYPING A B OR C: CPT

## 2023-09-28 PROCEDURE — 85025 COMPLETE CBC W/AUTO DIFF WBC: CPT

## 2023-09-28 PROCEDURE — 36415 COLL VENOUS BLD VENIPUNCTURE: CPT

## 2023-09-28 PROCEDURE — 84100 ASSAY OF PHOSPHORUS: CPT

## 2023-09-28 PROCEDURE — 86039 ANTINUCLEAR ANTIBODIES (ANA): CPT

## 2023-09-28 PROCEDURE — 1200000000 HC SEMI PRIVATE

## 2023-09-28 PROCEDURE — 82306 VITAMIN D 25 HYDROXY: CPT

## 2023-09-28 PROCEDURE — 83735 ASSAY OF MAGNESIUM: CPT

## 2023-09-28 PROCEDURE — 86431 RHEUMATOID FACTOR QUANT: CPT

## 2023-09-28 PROCEDURE — 86200 CCP ANTIBODY: CPT

## 2023-09-28 RX ORDER — PYRIDOXINE HCL (VITAMIN B6) 100 MG
100 TABLET ORAL DAILY
Qty: 13 TABLET | Refills: 0 | DISCHARGE
Start: 2023-09-29 | End: 2023-10-12

## 2023-09-28 RX ORDER — FOLIC ACID 1 MG/1
1 TABLET ORAL DAILY
Qty: 30 TABLET | Refills: 3 | DISCHARGE
Start: 2023-09-29

## 2023-09-28 RX ORDER — MAGNESIUM CHLORIDE 64 MG
1 TABLET, DELAYED RELEASE (ENTERIC COATED) ORAL
Status: DISCONTINUED | OUTPATIENT
Start: 2023-09-28 | End: 2023-09-29 | Stop reason: HOSPADM

## 2023-09-28 RX ORDER — LANOLIN ALCOHOL/MO/W.PET/CERES
100 CREAM (GRAM) TOPICAL DAILY
Qty: 30 TABLET | Refills: 3 | DISCHARGE
Start: 2023-09-29

## 2023-09-28 RX ORDER — PREDNISONE 10 MG/1
TABLET ORAL
Qty: 33 TABLET | Refills: 0 | DISCHARGE
Start: 2023-09-29 | End: 2023-10-13 | Stop reason: ALTCHOICE

## 2023-09-28 RX ORDER — LANOLIN ALCOHOL/MO/W.PET/CERES
100 CREAM (GRAM) TOPICAL DAILY
Status: DISCONTINUED | OUTPATIENT
Start: 2023-09-28 | End: 2023-09-29 | Stop reason: HOSPADM

## 2023-09-28 RX ADMIN — Medication 5 ML: at 02:07

## 2023-09-28 RX ADMIN — PANTOPRAZOLE SODIUM 40 MG: 40 TABLET, DELAYED RELEASE ORAL at 08:15

## 2023-09-28 RX ADMIN — ACETAMINOPHEN 650 MG: 325 TABLET ORAL at 08:23

## 2023-09-28 RX ADMIN — MAGNESIUM 64 MG (MAGNESIUM CHLORIDE) TABLET,DELAYED RELEASE 64 MG: at 09:40

## 2023-09-28 RX ADMIN — Medication 100 MG: at 08:15

## 2023-09-28 RX ADMIN — MICONAZOLE NITRATE: 20.6 POWDER TOPICAL at 19:41

## 2023-09-28 RX ADMIN — PREDNISONE 40 MG: 20 TABLET ORAL at 11:59

## 2023-09-28 RX ADMIN — Medication: at 19:41

## 2023-09-28 RX ADMIN — AMLODIPINE BESYLATE 10 MG: 10 TABLET ORAL at 08:15

## 2023-09-28 RX ADMIN — SODIUM CHLORIDE, PRESERVATIVE FREE 10 ML: 5 INJECTION INTRAVENOUS at 19:42

## 2023-09-28 RX ADMIN — Medication: at 08:11

## 2023-09-28 RX ADMIN — SODIUM CHLORIDE, PRESERVATIVE FREE 10 ML: 5 INJECTION INTRAVENOUS at 08:15

## 2023-09-28 RX ADMIN — POTASSIUM PHOSPHATE, MONOBASIC AND POTASSIUM PHOSPHATE, DIBASIC 20 MMOL: 224; 236 INJECTION, SOLUTION, CONCENTRATE INTRAVENOUS at 08:18

## 2023-09-28 RX ADMIN — Medication 5 ML: at 08:09

## 2023-09-28 RX ADMIN — PYRIDOXINE HCL TAB 50 MG 100 MG: 50 TAB at 09:39

## 2023-09-28 RX ADMIN — Medication 5 ML: at 16:32

## 2023-09-28 RX ADMIN — MICONAZOLE NITRATE: 20.6 POWDER TOPICAL at 08:11

## 2023-09-28 RX ADMIN — LISINOPRIL 20 MG: 20 TABLET ORAL at 08:15

## 2023-09-28 RX ADMIN — Medication 1 MG: at 08:15

## 2023-09-28 ASSESSMENT — PAIN SCALES - GENERAL
PAINLEVEL_OUTOF10: 0
PAINLEVEL_OUTOF10: 8
PAINLEVEL_OUTOF10: 8

## 2023-09-28 NOTE — PROGRESS NOTES
Physical Therapy  Physical Therapy Treatment    Name: Nadia Neumann  : 1970  MRN: [id number]      Date of Service: 2023    Evaluating PT:  Jens Fernandez PT, DPT    Room #:  0375/0814-S  Diagnosis:  Acute renal failure ([address]) [N17.9]  Acute kidney injury ([address]) [N17.9]  Traumatic rhabdomyolysis, initial encounter ([address]) Roque Pierson. 6XXA]  Acute renal failure, unspecified acute renal failure type ([address]) [N17.9]  PMHx/PSHx:  EtOH abuse, HTN, lumbar radiculopathy  Procedure/Surgery:  Skin punch biopsy (2023)  Precautions:  Falls, BLE wounds/blisters, external catheter  Equipment Needs:  FWW; TBD    SUBJECTIVE:    Pt lives alone in a 3rd floor apt with 3 flights of steps to access with B handrail. Pt ambulated with no AD PTA. OBJECTIVE:   Initial Evaluation  Date: 23 Treatment  2023 Short Term/ Long Term   Goals   AM-PAC 6 Clicks 44/18 70/93    Was pt agreeable to Eval/treatment? yes Yes    Does pt have pain? 10/10 generalized No c/o pain    Bed Mobility  Rolling: min A  Supine to sit: min A  Sit to supine: NT  Scooting: min A Rolling: NT  Supine to sit: SBA  Sit to supine: NT  Scooting: NT Rolling: mod I  Supine to sit: mod I  Sit to supine: mod I  Scooting: mod I   Transfers Sit to stand: max A  Stand to sit: min A  Stand pivot: mod A with arm in arm A Sit to stand: ModA  Stand to sit: ModA  Stand pivot: ModA with [facility] Sit to stand: mod I  Stand to sit: mod I  Stand pivot: mod I with AAD   Ambulation    2' with arm in arm A mod A 25 feet x2 with [facility] Krish    50 feet x4 with [facility] Krish 150'+ with AAD mod I   Stair negotiation: ascended and descended  NT NT 10x3 steps with 1 handrail mod I   Balance  Sitting EOB: SBA  Dynamic Standing: Krish with [facility]      Sensation:  No reports of numbness/tingling to extremities  Edema:  Unremarkable    Vitals:  -115, SpO2 97-99% with activity. Patient education  Pt educated on safety during functional mobility, use of call light for assistance.     Patient response to education:   Pt expressed understanding Pt demonstrated skill Pt requires further education in this area   Yes Yes Yes     ASSESSMENT:    Comments:  Patient in semi-Anaya's position upon arrival; agreeable to PT session with OT collaboration. Sat EOB for short duration before completing stand pivot transfer to bedside chair. Subsequently ambulated with decreased speed, narrow STEPHANIE, mild unsteadiness. Patient left sitting in bedside chair with alarm activated, call light in reach. Instructed not to get up on own and to use call light for assistance; expressed understanding. Treatment:  Patient practiced and was instructed in the following treatment:    Bed mobility - physical assistance provided as needed during activity  Static/dynamic sitting - performed to promote activity tolerance and balance maintenance  Functional transfers - verbal cues to facilitate proper positioning and sequencing, particularly related to hand/foot placement; physical assistance provided as needed during activity  Ambulation - verbal cues to facilitate proper positioning, particularly related to walker approximation and base of support; physical assistance provided as needed during activity  Skilled monitoring of vitals    PLAN:    Patient is making good progress towards established goals. Will continue with current POC.       Time in  1126  Time out  1156    Total Treatment Time  30 minutes     CPT codes:  [] Gait training 64800 0 minutes  [] Manual therapy 15405 0 minutes  [x] Therapeutic activities 20427 30 minutes  [] Therapeutic exercises 79443 0 minutes  [] Neuromuscular reeducation 45657 0 minutes    Lennox Boyce PT, DPT  [id number]

## 2023-09-28 NOTE — PROGRESS NOTES
Comprehensive Nutrition Assessment    Type and Reason for Visit:  Reassess    Nutrition Recommendations/Plan:   Continue current diet/ONS to optmiz nutrient intake/wound healing. Will continue to monitor. Malnutrition Assessment:  Malnutrition Status: At risk for malnutrition (Comment) (09/21/23 1029)    Context:  Acute Illness     Findings of the 6 clinical characteristics of malnutrition:  Energy Intake:  Mild decrease in energy intake (Comment) (since admission)  Weight Loss:  Unable to assess (d/t possible fluid shifts)     Body Fat Loss:  Unable to assess     Muscle Mass Loss:  Unable to assess    Fluid Accumulation:  No significant fluid accumulation     Strength:  Not Performed    Nutrition Assessment:    Pt. remains at risk d/t varied/suboptimal PO intake. Adm w/ AMS (complaints of weakness and being found on the floor) ; recent falls. Admit w/ mild rhabdomyolysis, metabolic encephalopathy (resolved) and PAULINA (resolved); concern for ETOH withdrawl. Noted oral ulcers and extremity blisters. GS was consulted d.t anemia with concern of GI bleed and also completed punch bx due to concern for SJS vs pemphigus vulgaris-results pending. Hx of ETOH abuse. Pt. reports difficulty w/ PO intake 2/2 oral ulcers. Will continue ONS and monitor. Nutrition Related Findings:    A&OX4, -I&Os (2 L), no edema, diarrhea, rounded/soft abd, low phos, mild hyperglycemia, Wound Type: Multiple, Open Wounds, Skin Tears, Wound Consult Pending (wound x 1 noted to R foot ; noted abrasions and blisters)       Current Nutrition Intake & Therapies:    Average Meal Intake: 51-75%, 0%, 1-25%  Average Supplements Intake: Unable to assess  ADULT ORAL NUTRITION SUPPLEMENT; Lunch, Dinner; Frozen Oral Supplement  ADULT ORAL NUTRITION SUPPLEMENT; Breakfast, Dinner;  Wound Healing Oral Supplement  ADULT DIET; Easy to Chew; 3 carb choices (45 gm/meal)    Anthropometric Measures:  Height: 5' 8\" (172.7 cm)  Ideal Body Weight (IBW): 154 lbs (70

## 2023-09-28 NOTE — DISCHARGE INSTRUCTIONS
=====================================  [facility], [address]  =====================================    Take your medications as directed in this summary    Future scheduled appointments are listed below or recommended appointments are mentioned above. Future Appointments   Date Time Provider [address] [place] [address]   10/2/2023  1:30 PM Kia Flores MD [facility]          Activity : As tolerated  Stairs : As tolerated  Exercise : As tolerated  Lifting : As tolerated   Sexual activity : Yes  Driving : With seat belt on. NO driving on narcotic pain medication if prescribed   Medications : Always take your medications as prescribed  Wound Care: none needed  Diet : You are asked to make an attempt to improve diet and exercise patterns to aid in medical management of your medical condition/problem. Call [facility] with any further questions. Return to Emergency Department with any worsening of your condition and/or fever greater than 101 degrees, new weakness, shortness of breath or chest pain.

## 2023-09-28 NOTE — CARE COORDINATION
Care Coordination  [facility] has received the precert and said the patient may be able to go there tonight. Await discharge order. [facility] after 8 pm , called [facility] they are all book up. Called the daughter and girlfriend to see if they could transport the patient tonight and they declined.  Plan dc in am.

## 2023-09-28 NOTE — CARE COORDINATION
09/28/23 CM Update: Spoke with Cory Hernandez from [facility] they have accepted patient and started precert. Updated PT/OT scores are pending.  Transport envelope in soft chart CM to follow Electronically signed by Radha Underwood RN CM on 9/28/2023 at 10:01 AM

## 2023-09-28 NOTE — PROGRESS NOTES
Occupational Therapy  OT BEDSIDE TREATMENT NOTE    [employer] [address] [place] [address]       Date:2023  Patient Name: Anel Mcpherson  MRN: [id number]  : 1970  Room: [address] [place] [address]     Per OT Eval:    Evaluating OT: Ruby Muñiz OTR/L; [id number]        Referring Provider: Dulce Lamb MD    Specific Provider Orders/Date: OT Eval and Treat 23        Diagnosis: Acute kidney injury; Acute renal failure. Pt found down in apartment by family PTA. Surgery: None this admission     Pertinent Medical History:  has a past medical history of Alcohol use, Essential hypertension, and Lumbar radiculopathy.       Recommended Adaptive Equipment: BSC, AE LB dressing/bathing/toileting PRN      Precautions:  Fall Risk, abner feet wounds/skin integrity, incontinent, external catheter,  Easy      Assessment of current deficits    [x] Functional mobility            [x]ADLs           [x] Strength                  []Cognition    [x] Functional transfers          [x] IADLs         [x] Safety Awareness   [x]Endurance    [x] Fine Coordination                         [x] Balance      [] Vision/perception   []Sensation      []Gross Motor Coordination             [] ROM           [] Delirium                   [] Motor Control      OT PLAN OF CARE   OT POC based on physician orders, patient diagnosis and results of clinical assessment     Frequency/Duration 1-3 days/wk for 2 weeks PRN   Specific OT Treatment Interventions to include:   * Instruction/training on adapted ADL techniques and AE recommendations to increase functional independence within precautions       * Training on energy conservation strategies, correct breathing pattern and techniques to improve independence/tolerance for self-care routine  * Functional transfer/mobility training/DME recommendations for increased independence, safety, and fall prevention  * Patient/Family education to increase follow through with safety techniques and functional independence  * Recommendation of environmental modifications for increased safety with functional transfers/mobility and ADLs  * Therapeutic exercise to improve motor endurance, ROM, and functional strength for ADLs/functional transfers  * Therapeutic activities to facilitate/challenge dynamic balance, stand tolerance for increased safety and independence with ADLs  * Positioning to improve skin integrity, interaction with environment and functional independence     Home Living: Pt lives alone in 3rd floor apartment w/ 3 flights of stairs & handrails to enter.    Bathroom setup: [address]   Equipment owned: None     Prior Level of Function: IND with ADLs , IND with IADLs; engaged in functional mobility with use of  no AD  Driving: Yes  Occupation: None reported     Pain Level: no c/o of pain this date    Cognition: A&O: 4/4; Follows simple multi step directions, pleasant and cooperative              Memory:  Good              Sequencing:  Fair              Problem solving:  Fair              Judgement/safety:  Fair                Functional Assessment:  AM-PAC Daily Activity Raw Score: 16/24    Initial Eval Status  Date: 9/25/23 Treatment Status  Date: 9/28/23 STGs = LTGs  Time frame: 10-14 days   Feeding Setup  Indep  Recommending meals seated in chair  Independent    Grooming Minimal Assist  SBA  Seated Modified [place]    UB Dressing Minimal Assist  Min A  Don/doff gown seated in chair  Modified [place]    LB Dressing Dependent   To don socks at bed-level d/t pain & decreased activity tolerance seated EOB Mod A  Using leg cross over method for R LE assist to pull over ankle,  L LE limited ROM due to LB wounds Supervision    Bathing Moderate Assist  Simulated seated EOB - increased difficulty reaching BLEs Mod A   simulated   Limited reach due to LB wounds  Supervision    Toileting Maximal Assist   For posterior hygiene care side-lying d/t BM

## 2023-09-28 NOTE — PLAN OF CARE
Problem: Discharge Planning  Goal: Discharge to home or other facility with appropriate resources  Outcome: Progressing     Problem: Safety - Adult  Goal: Free from fall injury  Outcome: Progressing     Problem: Pain  Goal: Verbalizes/displays adequate comfort level or baseline comfort level  Outcome: Progressing     Problem: Skin/Tissue Integrity  Goal: Absence of new skin breakdown  Description: 1. Monitor for areas of redness and/or skin breakdown  2. Assess vascular access sites hourly  3. Every 4-6 hours minimum:  Change oxygen saturation probe site  4. Every 4-6 hours:  If on nasal continuous positive airway pressure, respiratory therapy assess nares and determine need for appliance change or resting period.   Outcome: Progressing     Problem: Nutrition Deficit:  Goal: Optimize nutritional status  Outcome: Progressing     Problem: ABCDS Injury Assessment  Goal: Absence of physical injury  Outcome: Progressing

## 2023-09-28 NOTE — PLAN OF CARE
Problem: Discharge Planning  Goal: Discharge to home or other facility with appropriate resources  9/28/2023 0953 by Bel Gomez RN  Outcome: Progressing     Problem: Safety - Adult  Goal: Free from fall injury  9/28/2023 0953 by Bel Gomez RN  Outcome: Progressing     Problem: Pain  Goal: Verbalizes/displays adequate comfort level or baseline comfort level  9/28/2023 0953 by Bel Gomez RN  Outcome: Progressing     Problem: Skin/Tissue Integrity  Goal: Absence of new skin breakdown  Description: 1. Monitor for areas of redness and/or skin breakdown  2. Assess vascular access sites hourly  3. Every 4-6 hours minimum:  Change oxygen saturation probe site  4. Every 4-6 hours:  If on nasal continuous positive airway pressure, respiratory therapy assess nares and determine need for appliance change or resting period.   9/28/2023 0953 by Bel Gomez RN  Outcome: Progressing     Problem: Nutrition Deficit:  Goal: Optimize nutritional status  9/28/2023 0953 by Bel Gomez RN  Outcome: Progressing     Problem: ABCDS Injury Assessment  Goal: Absence of physical injury  9/28/2023 0953 by Bel Gomez RN  Outcome: Progressing

## 2023-09-29 VITALS
DIASTOLIC BLOOD PRESSURE: 98 MMHG | HEART RATE: 82 BPM | WEIGHT: 175.5 LBS | RESPIRATION RATE: 16 BRPM | TEMPERATURE: 98.8 F | OXYGEN SATURATION: 100 % | BODY MASS INDEX: 26.6 KG/M2 | HEIGHT: 68 IN | SYSTOLIC BLOOD PRESSURE: 164 MMHG

## 2023-09-29 PROBLEM — K13.70 ORAL MUCOSAL LESION: Status: ACTIVE | Noted: 2023-09-29

## 2023-09-29 PROBLEM — R23.8 SKIN BULLA: Status: ACTIVE | Noted: 2023-09-29

## 2023-09-29 PROBLEM — R19.7 DIARRHEA DUE TO MALABSORPTION: Status: ACTIVE | Noted: 2023-09-29

## 2023-09-29 PROBLEM — R19.5 ELEVATED FECAL CALPROTECTIN: Status: ACTIVE | Noted: 2023-09-29

## 2023-09-29 PROBLEM — K90.9 DIARRHEA DUE TO MALABSORPTION: Status: ACTIVE | Noted: 2023-09-29

## 2023-09-29 PROBLEM — G62.9 PERIPHERAL POLYNEUROPATHY: Status: ACTIVE | Noted: 2023-09-29

## 2023-09-29 PROBLEM — E46 PROTEIN DEFICIENCY (HCC): Status: ACTIVE | Noted: 2023-09-29

## 2023-09-29 LAB
ANA SER QL IA: NEGATIVE
ANION GAP SERPL CALCULATED.3IONS-SCNC: 12 MMOL/L (ref 7–16)
ANTI DNA DOUBLE STRANDED: NEGATIVE
BASOPHILS # BLD: 0.04 K/UL (ref 0–0.2)
BASOPHILS NFR BLD: 0 % (ref 0–2)
BUN SERPL-MCNC: 17 MG/DL (ref 6–20)
CALCIUM SERPL-MCNC: 8 MG/DL (ref 8.6–10.2)
CHLORIDE SERPL-SCNC: 100 MMOL/L (ref 98–107)
CO2 SERPL-SCNC: 21 MMOL/L (ref 22–29)
CREAT SERPL-MCNC: 1 MG/DL (ref 0.7–1.2)
EOSINOPHIL # BLD: 0.01 K/UL (ref 0.05–0.5)
EOSINOPHILS RELATIVE PERCENT: 0 % (ref 0–6)
ERYTHROCYTE [DISTWIDTH] IN BLOOD BY AUTOMATED COUNT: 15.7 % (ref 11.5–15)
GFR SERPL CREATININE-BSD FRML MDRD: >60 ML/MIN/1.73M2
GLUCOSE SERPL-MCNC: 93 MG/DL (ref 74–99)
HCT VFR BLD AUTO: 33.7 % (ref 37–54)
HGB BLD-MCNC: 10.8 G/DL (ref 12.5–16.5)
IMM GRANULOCYTES # BLD AUTO: 0.1 K/UL (ref 0–0.58)
IMM GRANULOCYTES NFR BLD: 1 % (ref 0–5)
LYMPHOCYTES NFR BLD: 1.73 K/UL (ref 1.5–4)
LYMPHOCYTES RELATIVE PERCENT: 14 % (ref 20–42)
MCH RBC QN AUTO: 29.6 PG (ref 26–35)
MCHC RBC AUTO-ENTMCNC: 32 G/DL (ref 32–34.5)
MCV RBC AUTO: 92.3 FL (ref 80–99.9)
MONOCYTES NFR BLD: 1.08 K/UL (ref 0.1–0.95)
MONOCYTES NFR BLD: 9 % (ref 2–12)
NEUTROPHILS NFR BLD: 76 % (ref 43–80)
NEUTS SEG NFR BLD: 9.55 K/UL (ref 1.8–7.3)
PLATELET # BLD AUTO: 306 K/UL (ref 130–450)
PMV BLD AUTO: 12 FL (ref 7–12)
POTASSIUM SERPL-SCNC: 3.8 MMOL/L (ref 3.5–5)
RBC # BLD AUTO: 3.65 M/UL (ref 3.8–5.8)
SODIUM SERPL-SCNC: 133 MMOL/L (ref 132–146)
VIT B1 PYROPHOSHATE BLD-SCNC: 140 NMOL/L (ref 70–180)
WBC OTHER # BLD: 12.5 K/UL (ref 4.5–11.5)

## 2023-09-29 PROCEDURE — 80048 BASIC METABOLIC PNL TOTAL CA: CPT

## 2023-09-29 PROCEDURE — 6370000000 HC RX 637 (ALT 250 FOR IP)

## 2023-09-29 PROCEDURE — 2580000003 HC RX 258

## 2023-09-29 PROCEDURE — 36415 COLL VENOUS BLD VENIPUNCTURE: CPT

## 2023-09-29 PROCEDURE — 86812 HLA TYPING A B OR C: CPT

## 2023-09-29 PROCEDURE — 85025 COMPLETE CBC W/AUTO DIFF WBC: CPT

## 2023-09-29 PROCEDURE — 6370000000 HC RX 637 (ALT 250 FOR IP): Performed by: STUDENT IN AN ORGANIZED HEALTH CARE EDUCATION/TRAINING PROGRAM

## 2023-09-29 RX ADMIN — Medication 5 ML: at 07:33

## 2023-09-29 RX ADMIN — Medication: at 08:19

## 2023-09-29 RX ADMIN — SODIUM CHLORIDE, PRESERVATIVE FREE 10 ML: 5 INJECTION INTRAVENOUS at 08:19

## 2023-09-29 RX ADMIN — Medication 1 MG: at 08:18

## 2023-09-29 RX ADMIN — PYRIDOXINE HCL TAB 50 MG 100 MG: 50 TAB at 08:18

## 2023-09-29 RX ADMIN — AMLODIPINE BESYLATE 10 MG: 10 TABLET ORAL at 08:18

## 2023-09-29 RX ADMIN — MAGNESIUM 64 MG (MAGNESIUM CHLORIDE) TABLET,DELAYED RELEASE 64 MG: at 08:18

## 2023-09-29 RX ADMIN — Medication 100 MG: at 08:18

## 2023-09-29 RX ADMIN — LISINOPRIL 20 MG: 20 TABLET ORAL at 08:18

## 2023-09-29 RX ADMIN — PANTOPRAZOLE SODIUM 40 MG: 40 TABLET, DELAYED RELEASE ORAL at 08:18

## 2023-09-29 RX ADMIN — MICONAZOLE NITRATE: 20.6 POWDER TOPICAL at 08:19

## 2023-09-29 ASSESSMENT — PAIN SCALES - GENERAL
PAINLEVEL_OUTOF10: 8
PAINLEVEL_OUTOF10: 8

## 2023-09-29 NOTE — PROGRESS NOTES
Pt complaining that he called on the light and that someone answered and hung up and that he was wet for 30 minutes. All staff stating that the call light never rang except for the time this RN was aware and went right in to bathe and clean the patient.

## 2023-09-29 NOTE — DISCHARGE SUMMARY
Discharge Summary    Akosua Bergman  :  1970  MRN:  [id number]    Admit date:  2023  Discharge date:  23     Admitting Physician:  Cheryl Jackson MD      Admission Condition:  poor    Discharged Condition:  stable    Discharge Diagnoses:   Patient Active Problem List   Diagnosis    Lumbar radiculopathy    Essential hypertension    Chronic bilateral low back pain with right-sided sciatica    Current moderate episode of major depressive disorder (HCC)    Skin bulla    Oral mucosal lesion    Elevated fecal calprotectin    Diarrhea due to malabsorption    Peripheral polyneuropathy    Protein deficiency [facility])       Hospital Course: Akosua Bergman is a 46 y.o. male with a PMH of hypertension, GERD, depression, alcohol use disorder and chronic low back pain who presented to the emergency department for altered mental status after being discovered on the floor at home. Patient states that he had been laying there for 2 days. Patient denied faling, syncope or near syncope. Work up revealed significant PAULINA, Rhabdomyolysis with CK >46,276, metabolic acidosis, hyperglycemia, elevated lipase, cortisol and WBC. CT head, CT Cervical, thoracic and lumbar spine were WNL, CT chest revealed findings concerning for emphysematous COPD, CT Abdomen revealed mild sigmoid diverticulosis. Additionally, patient had several electrolyte derangements. He admitted to being a chronic alcohol abuser therefore was started on CIWA. He developed new oral mucosal lesions and painful blisters/bullae on ankles. Dermatology consult unavailable inpatient. ENT and general surgery were consulted. ENT recommended steroids. Surgery performed a punch biopsy on 23. Patient's diet was advanced as-tolerated due to mouth/throat discomfort. IV fluids were discontinued on 23 and his anti-HTN medications were resumed. Steroids has since improved his bullae and oral mucosal lesions.  The patient's course was otherwise emergency medical condition->Emergency Medical Condition (MA) What reading provider will be dictating this exam?->CRC FINDINGS: Lower Chest: The lower chest appears unremarkable. Organs: The liver, spleen pancreas and gallbladder appear unremarkable and the unopacified abdomen. The adrenal glands and the kidneys appear normal. There is no evidence of renal calculi or ureteral calculi. GI/Bowel: The stomach, small bowel and the large bowel appears unremarkable. There are few scattered diverticuli in the sigmoid colon. The appendix appears unremarkable. Pelvis: The pelvis demonstrates a Barrientos balloon catheter in a nondistended bladder. There is no free air free fluid in the abdomen or pelvis. Peritoneum/Retroperitoneum: There is no evidence of retroperitoneal mass lesions or lymph node enlargement. Bones/Soft Tissues: The soft tissues and the osseous structures appear unremarkable. Mild sigmoid diverticulosis. Otherwise unremarkable unopacified abdomen and pelvis. CT CHEST WO CONTRAST    Result Date: 9/19/2023  EXAMINATION: CT OF THE CHEST WITHOUT CONTRAST 9/19/2023 9:24 pm TECHNIQUE: CT of the chest was performed without the administration of intravenous contrast. Multiplanar reformatted images are provided for review. Automated exposure control, iterative reconstruction, and/or weight based adjustment of the mA/kV was utilized to reduce the radiation dose to as low as reasonably achievable. COMPARISON: None. HISTORY: ORDERING SYSTEM PROVIDED HISTORY: fall, sepsis, PAULINA TECHNOLOGIST PROVIDED HISTORY: Reason for exam:->fall, sepsis, PAULINA Decision Support Exception - unselect if not a suspected or confirmed emergency medical condition->Emergency Medical Condition (MA) What reading provider will be dictating this exam?->CRC FINDINGS: Mediastinum: There is no evidence of mediastinal or hilar adenopathy. Lungs/pleura: The lungs are hyperexpanded with bullous emphysematous changes.  There are no focal alveolar

## 2023-09-29 NOTE — CARE COORDINATION
09/29/23 CM update:  PAS will  pt at 11:30am today.  BS RN and pt notified Electronically signed by Ifeanyi Jacinto RN CM on 9/29/2023 at 8:11 AM

## 2023-09-30 LAB — HLA B27: NEGATIVE

## 2023-10-08 NOTE — PROGRESS NOTES
Physician Progress Note      PATIENT:               Bernadette Ortega  [facility] #:                  [id number]  :                       1970  ADMIT DATE:       2023 5:34 PM  [address] [facility] DATE:        2023 9:26 AM  RESPONDING  PROVIDER #:        Michele Aragon MD          QUERY TEXT:    Pt. admitted for PAULINA. Per Procedure note dated 23 punch biopsy of lesion. To accurately reflect procedure performed please specify Site. The medical record reflects the following:  Risk Factors: Blisters lower extremities, lesions to buttock  Clinical Indicators:  23: General Surgery: blisters on lower extremities requiring punch biopsy  23: ENT: Multiple blistering lesion throughout his body, including one   present on the tongue. 23: Procedure Note : 4mm punch biopsy was used to obtain specimen of the   lesion. wound was closed with interrupted nylon sutured. Dressing applied. Treatment: General Surgery consult; Punch biopsy; Thank you,  Jadyn Segura BSN, R.N.  Clinical Documentation Improvement  [phone]  Options provided:  -- Punch biopsy to other, please specify site. , please specify site  -- Other - I will add my own diagnosis  -- Disagree - Not applicable / Not valid  -- Disagree - Clinically unable to determine / Unknown  -- Refer to Clinical Documentation Reviewer    PROVIDER RESPONSE TEXT:    Addendum to 23 procedure note: Punch biopsy to lower extremity    Query created by: Jadyn Segura on 2023 1:53 PM      Electronically signed by:  Michele Aragon MD 10/8/2023 7:43 PM

## 2023-10-13 ENCOUNTER — OFFICE VISIT (OUTPATIENT)
Dept: FAMILY MEDICINE CLINIC | Age: 53
End: 2023-10-13
Payer: COMMERCIAL

## 2023-10-13 ENCOUNTER — TELEPHONE (OUTPATIENT)
Dept: FAMILY MEDICINE CLINIC | Age: 53
End: 2023-10-13

## 2023-10-13 VITALS
WEIGHT: 183 LBS | HEIGHT: 68 IN | SYSTOLIC BLOOD PRESSURE: 160 MMHG | RESPIRATION RATE: 18 BRPM | OXYGEN SATURATION: 98 % | DIASTOLIC BLOOD PRESSURE: 95 MMHG | HEART RATE: 103 BPM | TEMPERATURE: 98.2 F | BODY MASS INDEX: 27.74 KG/M2

## 2023-10-13 DIAGNOSIS — L10.9 BULLOUS PEMPHIGUS: ICD-10-CM

## 2023-10-13 DIAGNOSIS — K92.1 BLOOD IN STOOL: ICD-10-CM

## 2023-10-13 DIAGNOSIS — Z09 HOSPITAL DISCHARGE FOLLOW-UP: ICD-10-CM

## 2023-10-13 DIAGNOSIS — G62.9 PERIPHERAL POLYNEUROPATHY: ICD-10-CM

## 2023-10-13 DIAGNOSIS — R23.8 SKIN BULLA: Primary | ICD-10-CM

## 2023-10-13 LAB
ANION GAP SERPL CALCULATED.3IONS-SCNC: 8 MMOL/L (ref 7–16)
BUN BLDV-MCNC: 16 MG/DL (ref 6–20)
CALCIUM SERPL-MCNC: 9 MG/DL (ref 8.6–10.2)
CHLORIDE BLD-SCNC: 101 MMOL/L (ref 98–107)
CO2: 27 MMOL/L (ref 22–29)
CREAT SERPL-MCNC: 1.1 MG/DL (ref 0.7–1.2)
GFR SERPL CREATININE-BSD FRML MDRD: >60 ML/MIN/1.73M2
GLUCOSE BLD-MCNC: 112 MG/DL (ref 74–99)
HCT VFR BLD CALC: 33.1 % (ref 37–54)
HEMOGLOBIN: 10.3 G/DL (ref 12.5–16.5)
MCH RBC QN AUTO: 29.3 PG (ref 26–35)
MCHC RBC AUTO-ENTMCNC: 31.1 G/DL (ref 32–34.5)
MCV RBC AUTO: 94 FL (ref 80–99.9)
PDW BLD-RTO: 17.2 % (ref 11.5–15)
PHOSPHORUS: 3.6 MG/DL (ref 2.5–4.5)
PLATELET # BLD: 245 K/UL (ref 130–450)
PMV BLD AUTO: 11 FL (ref 7–12)
POTASSIUM SERPL-SCNC: 4.1 MMOL/L (ref 3.5–5)
RBC # BLD: 3.52 M/UL (ref 3.8–5.8)
SODIUM BLD-SCNC: 136 MMOL/L (ref 132–146)
WBC # BLD: 12.5 K/UL (ref 4.5–11.5)

## 2023-10-13 PROCEDURE — 36415 COLL VENOUS BLD VENIPUNCTURE: CPT | Performed by: FAMILY MEDICINE

## 2023-10-13 RX ORDER — ZINC OXIDE 22 G/100G
4 CREAM TOPICAL 2 TIMES DAILY
Qty: 200 G | Refills: 1 | Status: SHIPPED | OUTPATIENT
Start: 2023-10-13 | End: 2023-11-12

## 2023-10-13 RX ORDER — AMOXICILLIN AND CLAVULANATE POTASSIUM 875; 125 MG/1; MG/1
1 TABLET, FILM COATED ORAL 2 TIMES DAILY
Qty: 14 TABLET | Refills: 0 | Status: SHIPPED | OUTPATIENT
Start: 2023-10-13 | End: 2023-10-20

## 2023-10-13 NOTE — TELEPHONE ENCOUNTER
The patient called to ask if any cream was being prescribed.   He reports it was discussed but was not clarified if it was being ordered   Please advise

## 2023-10-13 NOTE — PROGRESS NOTES
refusing to work with PT,     Cellulitis. Will prescribe amoxicillin 875 mg/125 to be taken for the next 7 days. Take 1 tablet every 12 hours. Patient was instructed to return to clinic or to follow-up with me or any other provider if not improving or if he starts to develop worsening of his current rash. Medical Decision Making: straightforward  Return if symptoms worsen or fail to improve. Subjective:   HPI:  Follow up of Hospital problems/diagnosis(es):     Inpatient course: Discharge summary reviewed- see chart. Interval history/Current status:   Recently admitted on 9/19 for altered mental status. During hospital admission he had significant PAULINA with rhabdomyolysis, metabolic acidosis, hyperglycemia, elevated lipase. Imaging of CT head, CT cervical thoracic and lumbar spine were within normal limits. CT chest with emphysematous findings concerning for COPD. CT abdomen did reveal some mild sigmoid diverticulosis. During hospital stay, he developed oral mucosa bullae as well as extremities blister around her legs and ankles. Dermatology was not able to be consulted during hospital stay, but ENT was consulted and recommended steroids taper which he had been taking for 7 days already finished. There was a punch biopsy done by general surgery on 9/25. 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 Bulla was noticed to be improved with steroid, prior to discharge    Patient was evaluated by physical therapy and recommended subacute rehab. He was deemed stable enough for discharge and sent to [facility]. Today,  He states  Blisters are pretty much resolved after receiving steroids in the hospital.  Does report having this new blisters or skin rash in his groin and anal area. Denies any significant discharge, fevers, lethargy, new partners or opening of wounds.   States that this has been present since

## 2023-10-17 LAB — VITAMIN B6: 353.8 NMOL/L (ref 20–125)

## 2023-10-20 NOTE — TELEPHONE ENCOUNTER
Last Appointment:  10/13/2023  Future Appointments   Date Time Provider [address]   12/5/2023  9:15 AM Michele Simpson MD [facility]

## 2023-10-23 RX ORDER — PANTOPRAZOLE SODIUM 40 MG/1
40 TABLET, DELAYED RELEASE ORAL
Qty: 30 TABLET | Refills: 3 | Status: SHIPPED | OUTPATIENT
Start: 2023-10-23 | End: 2024-02-20

## 2023-10-23 RX ORDER — LISINOPRIL AND HYDROCHLOROTHIAZIDE 25; 20 MG/1; MG/1
1 TABLET ORAL DAILY
Qty: 30 TABLET | Refills: 5 | Status: SHIPPED | OUTPATIENT
Start: 2023-10-23

## 2023-11-13 ENCOUNTER — OFFICE VISIT (OUTPATIENT)
Dept: FAMILY MEDICINE CLINIC | Age: 53
End: 2023-11-13
Payer: COMMERCIAL

## 2023-11-13 VITALS
SYSTOLIC BLOOD PRESSURE: 128 MMHG | OXYGEN SATURATION: 99 % | DIASTOLIC BLOOD PRESSURE: 87 MMHG | RESPIRATION RATE: 18 BRPM | HEIGHT: 68 IN | HEART RATE: 84 BPM | BODY MASS INDEX: 26.67 KG/M2 | WEIGHT: 176 LBS | TEMPERATURE: 98.2 F

## 2023-11-13 DIAGNOSIS — G62.9 PERIPHERAL POLYNEUROPATHY: Primary | ICD-10-CM

## 2023-11-13 LAB
FOLATE: 9.8 NG/ML (ref 4.8–24.2)
HCT VFR BLD CALC: 37.2 % (ref 37–54)
HEMOGLOBIN: 11.8 G/DL (ref 12.5–16.5)
MCH RBC QN AUTO: 28.3 PG (ref 26–35)
MCHC RBC AUTO-ENTMCNC: 31.7 G/DL (ref 32–34.5)
MCV RBC AUTO: 89.2 FL (ref 80–99.9)
PDW BLD-RTO: 13.9 % (ref 11.5–15)
PLATELET, FLUORESCENCE: 122 K/UL (ref 130–450)
PMV BLD AUTO: 13.3 FL (ref 7–12)
RBC # BLD: 4.17 M/UL (ref 3.8–5.8)
TSH SERPL DL<=0.05 MIU/L-ACNC: 3.42 UIU/ML (ref 0.27–4.2)
VITAMIN B-12: 553 PG/ML (ref 211–946)
VITAMIN D 25-HYDROXY: 18 NG/ML (ref 30–100)
WBC # BLD: 6.1 K/UL (ref 4.5–11.5)

## 2023-11-13 PROCEDURE — 1036F TOBACCO NON-USER: CPT

## 2023-11-13 PROCEDURE — G8419 CALC BMI OUT NRM PARAM NOF/U: HCPCS

## 2023-11-13 PROCEDURE — 3017F COLORECTAL CA SCREEN DOC REV: CPT

## 2023-11-13 PROCEDURE — 99214 OFFICE O/P EST MOD 30 MIN: CPT

## 2023-11-13 PROCEDURE — G8427 DOCREV CUR MEDS BY ELIG CLIN: HCPCS

## 2023-11-13 PROCEDURE — 90471 IMMUNIZATION ADMIN: CPT | Performed by: FAMILY MEDICINE

## 2023-11-13 PROCEDURE — 90674 CCIIV4 VAC NO PRSV 0.5 ML IM: CPT | Performed by: FAMILY MEDICINE

## 2023-11-13 PROCEDURE — G8482 FLU IMMUNIZE ORDER/ADMIN: HCPCS

## 2023-11-13 PROCEDURE — 3074F SYST BP LT 130 MM HG: CPT

## 2023-11-13 PROCEDURE — 36415 COLL VENOUS BLD VENIPUNCTURE: CPT

## 2023-11-13 PROCEDURE — 3078F DIAST BP <80 MM HG: CPT

## 2023-11-13 RX ORDER — AMLODIPINE BESYLATE 5 MG/1
5 TABLET ORAL DAILY
Qty: 30 TABLET | Refills: 3 | Status: SHIPPED | OUTPATIENT
Start: 2023-11-13

## 2023-11-13 RX ORDER — LISINOPRIL AND HYDROCHLOROTHIAZIDE 25; 20 MG/1; MG/1
1 TABLET ORAL DAILY
Qty: 30 TABLET | Refills: 5 | Status: SHIPPED | OUTPATIENT
Start: 2023-11-13

## 2023-11-17 LAB
ALBUMIN SERPL-MCNC: 4.4 G/DL (ref 3.5–5.2)
ALP BLD-CCNC: 77 U/L (ref 40–129)
ALT SERPL-CCNC: 28 U/L (ref 0–40)
ANION GAP SERPL CALCULATED.3IONS-SCNC: 22 MMOL/L (ref 7–16)
AST SERPL-CCNC: 22 U/L (ref 0–39)
BILIRUB SERPL-MCNC: <0.2 MG/DL (ref 0–1.2)
BUN BLDV-MCNC: 16 MG/DL (ref 6–20)
CALCIUM SERPL-MCNC: 9.6 MG/DL (ref 8.6–10.2)
CHLORIDE BLD-SCNC: 103 MMOL/L (ref 98–107)
CO2: 19 MMOL/L (ref 22–29)
CREAT SERPL-MCNC: 1.3 MG/DL (ref 0.7–1.2)
GFR SERPL CREATININE-BSD FRML MDRD: >60 ML/MIN/1.73M2
GLUCOSE BLD-MCNC: 83 MG/DL (ref 74–99)
POTASSIUM SERPL-SCNC: 3.3 MMOL/L (ref 3.5–5)
SODIUM BLD-SCNC: 144 MMOL/L (ref 132–146)
TOTAL PROTEIN: 7.2 G/DL (ref 6.4–8.3)

## 2023-11-17 NOTE — RESULT ENCOUNTER NOTE
Called patient to inform him of lab results. States he has not been drinking enough water on past weeks  Instructed him to stop taking lisinopril-hctz for now and to come back in 3-6 days to have his bmp rechecked. Encourage fluid intake. He Understood instructions. All questions answered. Order placed.

## 2023-12-05 ENCOUNTER — OFFICE VISIT (OUTPATIENT)
Dept: SURGERY | Age: 53
End: 2023-12-05
Payer: COMMERCIAL

## 2023-12-05 ENCOUNTER — TELEPHONE (OUTPATIENT)
Dept: SURGERY | Age: 53
End: 2023-12-05

## 2023-12-05 VITALS
SYSTOLIC BLOOD PRESSURE: 178 MMHG | BODY MASS INDEX: 27.58 KG/M2 | HEART RATE: 97 BPM | RESPIRATION RATE: 16 BRPM | OXYGEN SATURATION: 100 % | WEIGHT: 182 LBS | TEMPERATURE: 97.2 F | DIASTOLIC BLOOD PRESSURE: 122 MMHG | HEIGHT: 68 IN

## 2023-12-05 DIAGNOSIS — M79.89 SOFT TISSUE MASS: ICD-10-CM

## 2023-12-05 DIAGNOSIS — K92.1 BLOOD IN STOOL: Primary | ICD-10-CM

## 2023-12-05 PROCEDURE — G8427 DOCREV CUR MEDS BY ELIG CLIN: HCPCS | Performed by: SURGERY

## 2023-12-05 PROCEDURE — G8482 FLU IMMUNIZE ORDER/ADMIN: HCPCS | Performed by: SURGERY

## 2023-12-05 PROCEDURE — 99202 OFFICE O/P NEW SF 15 MIN: CPT | Performed by: SURGERY

## 2023-12-05 PROCEDURE — 3080F DIAST BP >= 90 MM HG: CPT | Performed by: SURGERY

## 2023-12-05 PROCEDURE — 1036F TOBACCO NON-USER: CPT | Performed by: SURGERY

## 2023-12-05 PROCEDURE — 3017F COLORECTAL CA SCREEN DOC REV: CPT | Performed by: SURGERY

## 2023-12-05 PROCEDURE — 3077F SYST BP >= 140 MM HG: CPT | Performed by: SURGERY

## 2023-12-05 PROCEDURE — G8419 CALC BMI OUT NRM PARAM NOF/U: HCPCS | Performed by: SURGERY

## 2023-12-05 PROCEDURE — 99205 OFFICE O/P NEW HI 60 MIN: CPT | Performed by: SURGERY

## 2023-12-05 RX ORDER — PREDNISONE 20 MG/1
TABLET ORAL
COMMUNITY
Start: 2023-09-29

## 2023-12-05 RX ORDER — SODIUM CHLORIDE 0.9 % (FLUSH) 0.9 %
10 SYRINGE (ML) INJECTION PRN
OUTPATIENT
Start: 2023-12-05

## 2023-12-05 RX ORDER — SODIUM CHLORIDE 0.9 % (FLUSH) 0.9 %
5-40 SYRINGE (ML) INJECTION PRN
OUTPATIENT
Start: 2023-12-05

## 2023-12-05 RX ORDER — SODIUM CHLORIDE 9 MG/ML
25 INJECTION, SOLUTION INTRAVENOUS PRN
OUTPATIENT
Start: 2023-12-05

## 2023-12-05 RX ORDER — SERTRALINE HYDROCHLORIDE 100 MG/1
TABLET, FILM COATED ORAL
COMMUNITY
Start: 2023-11-07

## 2023-12-05 RX ORDER — SODIUM CHLORIDE 0.9 % (FLUSH) 0.9 %
10 SYRINGE (ML) INJECTION EVERY 12 HOURS SCHEDULED
OUTPATIENT
Start: 2023-12-05

## 2023-12-05 RX ORDER — SODIUM CHLORIDE 9 MG/ML
INJECTION, SOLUTION INTRAVENOUS PRN
OUTPATIENT
Start: 2023-12-05

## 2023-12-05 RX ORDER — SODIUM CHLORIDE 0.9 % (FLUSH) 0.9 %
5-40 SYRINGE (ML) INJECTION EVERY 12 HOURS SCHEDULED
OUTPATIENT
Start: 2023-12-05

## 2023-12-05 RX ORDER — ACETAMINOPHEN 325 MG/1
1000 TABLET ORAL ONCE
OUTPATIENT
Start: 2023-12-05 | End: 2023-12-05

## 2023-12-05 RX ORDER — SODIUM CHLORIDE 9 MG/ML
INJECTION, SOLUTION INTRAVENOUS CONTINUOUS
OUTPATIENT
Start: 2023-12-05

## 2023-12-05 ASSESSMENT — ENCOUNTER SYMPTOMS
RECTAL PAIN: 0
RESPIRATORY NEGATIVE: 1
BLOOD IN STOOL: 1
ABDOMINAL DISTENTION: 0
ABDOMINAL PAIN: 0

## 2023-12-05 NOTE — TELEPHONE ENCOUNTER
Scheduled pt for Colonoscopy 24 at 9:45AM. Pt needs to arrive at [address] at 8:45AM. Patient confirmed date and time, address and directions given in office. Prep instructions given in office, patient understood. Prior Authorization Form:      DEMOGRAPHICS:                     Patient Name:  [facility]  Patient :  1970            Insurance:  Payor: Mack Mahajan / Plan: [employer] / Product Type: *No Product type* /   Insurance ID Number:    Payer/Plan Subscr  Sex Relation Sub.  Ins. ID Effective Group Num   1. [employer] - * [facility] 1970 Male Self [id number] [address] [facility] [address]         DIAGNOSIS & PROCEDURE:                       Procedure/Operation: COLONOSCOPY           CPT Code: 98767    Diagnosis:  BLOOD IN STOOL    ICD10 Code: K92.1    Location:  [facility]    Surgeon:  Michele Simpson    SCHEDULING INFORMATION:                          Date: 24    Time: 9:45AM              Anesthesia:  LMAC                                                       Status:  OUTPATIENT       Special Comments:  N/A       Electronically signed by Adis Cowart MA on 2023 at 10:47 AM

## 2023-12-05 NOTE — PATIENT INSTRUCTIONS
the procedure. Any stool left in the intestine will block the view. This preparation may start several days before the procedure. Follow your doctor's instructions. Leading up to your procedure:   Talk to your doctor about your medicines. You may be asked to stop taking some medicines up to one week before the procedure, like:   Anti-inflammatory drugs (e.g., aspirin )   Blood thinners like clopidogrel (Plavix) or warfarin (Coumadin)   Iron supplements or vitamins containing iron   The day or days before your procedure, go on a clear liquid diet (clear broth, clear juice, clear jello) with no red coloring  Do not eat or drink anything after midnight. Wear comfortable clothing. If you have diabetes, ask your doctor if you need to adjust your diabetes medicine on the day prior to your procedure and the day of your procedure. Arrange for a ride home after the procedure. Anesthesia   You will receive intravenous sedation medicine for the procedure so you will not feel anything during the procedure. Description of the Procedure   You will lie on your left side with knees bent and drawn up toward your chest. The colonoscope will be slowly inserted through the rectum and into the bowel. The colonoscope will inject air into the colon. A small attached video camera will allow the doctor to view the colon's lining on a screen. The doctor will continue guiding the tool through the bowel and assess the lining. A tissue sample or polyps may be removed during the procedure. How Long Will It Take? Usually it takes about 30 to 45 minutes     Will It Hurt? Most people do not feel anything during the procedure and will not remember the procedure. After the procedure, gas pains and cramping are common. These pains should go away with the passing of gas. Post-procedure Care   If any tissue was removed: It will be sent to a lab to be examined. It may take 1-2 weeks for results.  The doctor will usually

## 2023-12-05 NOTE — H&P
no acute distress, comfortable, 47 yo black man   Head - normocephalic,atraumatic  Eyes - pupils symmetric, conjunctivae pink  ENT - nose/ears appear normal,  good dentition, moist oral mucosa   Neck - no cervical adenopathy, mass, thyromegaly   Respiratory - normal effort, clear to auscultation  CV - regular rate and rhythm, strong femoral pulses, no pedal edema  GI - no scars, non distended,  non tender, no hernia, no mass, no hepatosplenomegaly   Anorectal - deferred  Skin - warm, dry, good turgor; no rash  Musculoskeletal - normal gait, no deformities   Psychiatric - alert and oriented x 3; normal mood, affect, judgement, thought content

## 2023-12-06 ENCOUNTER — TELEPHONE (OUTPATIENT)
Dept: SURGERY | Age: 53
End: 2023-12-06

## 2023-12-06 PROBLEM — M79.89 MASS OF SOFT TISSUE: Status: ACTIVE | Noted: 2023-12-05

## 2023-12-06 NOTE — TELEPHONE ENCOUNTER
Scheduled pt for EXCISION OF BACK MASS 24 at 12PM. Pt needs to arrive at [address] at [facility]. Patient confirmed date and time, address and directions given in office. Prep instructions given in office, patient understood. Prior Authorization Form:      DEMOGRAPHICS:                     Patient Name:  Chrissie Toney  Patient :  1970            Insurance:  Payor: Duane Dacgraham / Plan: [employer] / Product Type: *No Product type* /   Insurance ID Number:    Payer/Plan Subscr  Sex Relation Sub.  Ins. ID Effective Group Num   1. [employer] - Chrissie Toney 1970 Male Self [id number] [address] [facility] [address]         DIAGNOSIS & PROCEDURE:                       Procedure/Operation: EXCISION OF BACK MASS           CPT Code: 01235    Diagnosis:  SOFT TISSUE MASS    ICD10 Code: M79.89    Location:  [address] [place] [address]    Surgeon:  Daniel Garvey    SCHEDULING INFORMATION:                          Date: 24    Time: 12PM              Anesthesia:  LMAC                                                       Status:  OUTPATIENT       Special Comments:  N/A       Electronically signed by Mary Segovia MA on 2023 at 12:40 PM

## 2023-12-08 ENCOUNTER — HOSPITAL ENCOUNTER (OUTPATIENT)
Dept: PHYSICAL THERAPY | Age: 53
Setting detail: THERAPIES SERIES
Discharge: HOME | End: 2023-12-08

## 2023-12-08 NOTE — PROGRESS NOTES
[address]                Phone: [phone]  Fax: [phone]    Physical Therapy  Cancellation/No-show Note  Patient Name:  Nikia Osullivan  :  1970   Date:  2023    For today's appointment patient:  []  Cancelled  []  Rescheduled appointment  [x]  No-show     Reason given by patient:  []  Patient ill  []  Conflicting appointment  []  No transportation    []  Conflict with work  [x]  No reason given  []  Other:     Comments:  pt was a no-show for initial evaluation. Electronically signed by:   Lenora Medley PT

## 2023-12-27 DIAGNOSIS — Z76.0 MEDICATION REFILL: ICD-10-CM

## 2023-12-27 RX ORDER — AMITRIPTYLINE HYDROCHLORIDE 25 MG/1
25 TABLET, FILM COATED ORAL NIGHTLY
Qty: 90 TABLET | Refills: 0 | Status: CANCELLED | OUTPATIENT
Start: 2023-12-27 | End: 2024-03-26